# Patient Record
Sex: FEMALE | Race: WHITE | Employment: OTHER | URBAN - METROPOLITAN AREA
[De-identification: names, ages, dates, MRNs, and addresses within clinical notes are randomized per-mention and may not be internally consistent; named-entity substitution may affect disease eponyms.]

---

## 2017-07-12 ENCOUNTER — OFFICE VISIT (OUTPATIENT)
Dept: PODIATRY | Age: 82
End: 2017-07-12
Payer: MEDICARE

## 2017-07-12 VITALS
DIASTOLIC BLOOD PRESSURE: 71 MMHG | BODY MASS INDEX: 28.07 KG/M2 | WEIGHT: 143 LBS | HEIGHT: 60 IN | SYSTOLIC BLOOD PRESSURE: 156 MMHG | HEART RATE: 70 BPM

## 2017-07-12 DIAGNOSIS — B35.1 ONYCHOMYCOSIS: Primary | ICD-10-CM

## 2017-07-12 DIAGNOSIS — M79.675 PAIN IN TOES OF BOTH FEET: ICD-10-CM

## 2017-07-12 DIAGNOSIS — M79.674 PAIN IN TOES OF BOTH FEET: ICD-10-CM

## 2017-07-12 PROCEDURE — 1090F PRES/ABSN URINE INCON ASSESS: CPT | Performed by: PODIATRIST

## 2017-07-12 PROCEDURE — G8427 DOCREV CUR MEDS BY ELIG CLIN: HCPCS | Performed by: PODIATRIST

## 2017-07-12 PROCEDURE — 1036F TOBACCO NON-USER: CPT | Performed by: PODIATRIST

## 2017-07-12 PROCEDURE — G8400 PT W/DXA NO RESULTS DOC: HCPCS | Performed by: PODIATRIST

## 2017-07-12 PROCEDURE — 11721 DEBRIDE NAIL 6 OR MORE: CPT | Performed by: PODIATRIST

## 2017-07-12 PROCEDURE — G8419 CALC BMI OUT NRM PARAM NOF/U: HCPCS | Performed by: PODIATRIST

## 2017-07-12 PROCEDURE — 1123F ACP DISCUSS/DSCN MKR DOCD: CPT | Performed by: PODIATRIST

## 2017-07-12 PROCEDURE — 99203 OFFICE O/P NEW LOW 30 MIN: CPT | Performed by: PODIATRIST

## 2017-07-12 PROCEDURE — 4040F PNEUMOC VAC/ADMIN/RCVD: CPT | Performed by: PODIATRIST

## 2017-07-12 RX ORDER — BUTALBITAL, ACETAMINOPHEN AND CAFFEINE 50; 325; 40 MG/1; MG/1; MG/1
1 TABLET ORAL
COMMUNITY
End: 2018-04-24 | Stop reason: ALTCHOICE

## 2017-07-12 RX ORDER — OMEPRAZOLE 10 MG/1
10 CAPSULE, DELAYED RELEASE ORAL
COMMUNITY
End: 2018-07-24 | Stop reason: ALTCHOICE

## 2017-07-12 RX ORDER — CLOPIDOGREL BISULFATE 75 MG/1
75 TABLET ORAL
COMMUNITY

## 2017-07-12 RX ORDER — IBUPROFEN 800 MG/1
800 TABLET ORAL
COMMUNITY
End: 2018-04-24 | Stop reason: ALTCHOICE

## 2017-07-12 RX ORDER — DOCUSATE SODIUM 100 MG/1
100 CAPSULE, LIQUID FILLED ORAL
COMMUNITY
End: 2019-01-23

## 2017-07-12 RX ORDER — GEMFIBROZIL 600 MG/1
600 TABLET, FILM COATED ORAL 2 TIMES DAILY
COMMUNITY

## 2017-07-12 RX ORDER — NADOLOL 20 MG/1
5 TABLET ORAL
COMMUNITY
End: 2018-07-24

## 2017-07-12 RX ORDER — TRAMADOL HYDROCHLORIDE 50 MG/1
TABLET ORAL
COMMUNITY
Start: 2017-04-09 | End: 2018-07-24 | Stop reason: ALTCHOICE

## 2017-07-12 RX ORDER — NORTRIPTYLINE HYDROCHLORIDE 10 MG/1
50 CAPSULE ORAL
COMMUNITY
End: 2018-07-24

## 2017-07-12 RX ORDER — ASPIRIN 325 MG
81 TABLET ORAL
COMMUNITY
End: 2020-01-01

## 2017-07-12 RX ORDER — PHENOL 1.4 %
600 AEROSOL, SPRAY (ML) MUCOUS MEMBRANE
COMMUNITY
End: 2018-07-24

## 2017-07-12 RX ORDER — OXYBUTYNIN CHLORIDE 5 MG/1
5 TABLET ORAL
Status: ON HOLD | COMMUNITY
End: 2020-01-01

## 2017-07-12 RX ORDER — ATORVASTATIN CALCIUM 20 MG/1
20 TABLET, FILM COATED ORAL
COMMUNITY

## 2017-07-12 ASSESSMENT — ENCOUNTER SYMPTOMS
VOMITING: 0
COLOR CHANGE: 0
CONSTIPATION: 0
NAUSEA: 0
DIARRHEA: 0
BACK PAIN: 1

## 2017-10-11 ENCOUNTER — PROCEDURE VISIT (OUTPATIENT)
Dept: PODIATRY | Age: 82
End: 2017-10-11
Payer: MEDICARE

## 2017-10-11 VITALS
HEIGHT: 60 IN | HEART RATE: 52 BPM | DIASTOLIC BLOOD PRESSURE: 62 MMHG | SYSTOLIC BLOOD PRESSURE: 149 MMHG | WEIGHT: 158 LBS | BODY MASS INDEX: 31.02 KG/M2

## 2017-10-11 DIAGNOSIS — M79.675 PAIN IN TOES OF BOTH FEET: ICD-10-CM

## 2017-10-11 DIAGNOSIS — M79.674 PAIN IN TOES OF BOTH FEET: ICD-10-CM

## 2017-10-11 DIAGNOSIS — B35.1 ONYCHOMYCOSIS: Primary | ICD-10-CM

## 2017-10-11 PROCEDURE — 1036F TOBACCO NON-USER: CPT | Performed by: PODIATRIST

## 2017-10-11 PROCEDURE — 11721 DEBRIDE NAIL 6 OR MORE: CPT | Performed by: PODIATRIST

## 2017-10-11 RX ORDER — PANTOPRAZOLE SODIUM 40 MG/1
40 TABLET, DELAYED RELEASE ORAL DAILY
COMMUNITY
Start: 2017-09-11

## 2017-10-11 RX ORDER — GABAPENTIN 100 MG/1
CAPSULE ORAL 4 TIMES DAILY
COMMUNITY
Start: 2017-10-10 | End: 2019-01-01 | Stop reason: DRUGHIGH

## 2017-10-11 ASSESSMENT — ENCOUNTER SYMPTOMS
BACK PAIN: 1
VOMITING: 0
CONSTIPATION: 0
NAUSEA: 0
COLOR CHANGE: 0
DIARRHEA: 0

## 2017-10-11 NOTE — PROGRESS NOTES
Negative for leg swelling. Gastrointestinal: Negative for constipation, diarrhea, nausea and vomiting. Musculoskeletal: Positive for arthralgias, back pain, gait problem and joint swelling. Skin: Negative for color change, pallor, rash and wound. Neurological: Negative for weakness and numbness. Objective:  General: AAO x 3 in NAD. Derm  Toenail Description  Sites of Onychomycosis Involvement (Check affected area)  [x] [x] [x] [x] [x] [x] [x] [x] [x] [x]  5 4 3 2 1 1 2 3 4 5                          Right                                        Left    Thickness  [x] [x] [x] [x] [x] [x] [x] [x] [x] [x]  5 4 3 2 1 1 2 3 4 5                         Right                                        Left    Dystrophic Changes   [x] [x] [x] [x] [x] [x] [x] [x] [x] [x]  5 4 3 2 1 1 2 3 4 5                         Right                                        Left    Color  [x] [x] [x] [x] [x] [x] [x] [x] [x] [x]  5 4 3 2 1 1 2 3 4 5                          Right                                        Left    Incurvation/Ingrowin   [] [] [] [] [] [] [] [] [] []  5 4 3 2 1 1 2 3 4 5                         Right                                        Left    Inflammation/Pain   [x] [x] [x] [x] [x] [x] [x] [x] [x] [x]  5 4 3 2 1 1 2 3 4 5                         Right                                        Left       Nails are painful 1-10 with direct palpation. Vascular:  DP/PT pulses palpable 2/4, Bilateral.    CFT <3 seconds to digits 1-5, Bilateral .   Hair growth absent to level of digits, Bilateral.    Neurological:  Sensation present to light touch to level of digits, Bilateral.    Assessment:  80 y.o. female with:   1. Onychomycosis    2. Pain in toes of both feet       Orders Placed This Encounter   Procedures    OH DEBRIDEMENT OF NAILS, 6 OR MORE         Plan:   Pt was evaluated and examined. Patient was given personalized discharge instructions.   Nails 1-10 were debrided in length and thickness sharply with a nail nipper and  without incident. Pt will follow up in 3 months or sooner if any problems arise. Diagnosis was discussed with the pt and all of their questions were answered in detail. Proper foot hygiene and care was discussed with the pt. Patient to check feet daily and contact the office with any questions/problems/concerns. Other comorbidity noted and will be managed by PCP. Pain waiver discussed with patient and confirmed.    10/11/2017    Electronically signed by Ivelisse Khan DPM on 10/11/2017 at 4:54 PM

## 2018-01-03 ENCOUNTER — PROCEDURE VISIT (OUTPATIENT)
Dept: PODIATRY | Age: 83
End: 2018-01-03
Payer: MEDICARE

## 2018-01-03 VITALS
HEIGHT: 60 IN | WEIGHT: 143 LBS | HEART RATE: 55 BPM | DIASTOLIC BLOOD PRESSURE: 66 MMHG | BODY MASS INDEX: 28.07 KG/M2 | SYSTOLIC BLOOD PRESSURE: 157 MMHG

## 2018-01-03 DIAGNOSIS — M79.675 PAIN IN TOES OF BOTH FEET: ICD-10-CM

## 2018-01-03 DIAGNOSIS — M79.674 PAIN IN TOES OF BOTH FEET: ICD-10-CM

## 2018-01-03 DIAGNOSIS — B35.1 ONYCHOMYCOSIS: Primary | ICD-10-CM

## 2018-01-03 PROCEDURE — 11721 DEBRIDE NAIL 6 OR MORE: CPT | Performed by: PODIATRIST

## 2018-01-03 ASSESSMENT — ENCOUNTER SYMPTOMS
BACK PAIN: 1
VOMITING: 0
DIARRHEA: 0
COLOR CHANGE: 0
CONSTIPATION: 0
NAUSEA: 0

## 2018-04-24 ENCOUNTER — PROCEDURE VISIT (OUTPATIENT)
Dept: PODIATRY | Age: 83
End: 2018-04-24
Payer: MEDICARE

## 2018-04-24 VITALS
BODY MASS INDEX: 27.09 KG/M2 | WEIGHT: 138 LBS | HEIGHT: 60 IN | SYSTOLIC BLOOD PRESSURE: 144 MMHG | DIASTOLIC BLOOD PRESSURE: 76 MMHG | HEART RATE: 60 BPM

## 2018-04-24 DIAGNOSIS — B35.1 ONYCHOMYCOSIS: Primary | ICD-10-CM

## 2018-04-24 DIAGNOSIS — M79.675 PAIN IN TOES OF BOTH FEET: ICD-10-CM

## 2018-04-24 DIAGNOSIS — M79.674 PAIN IN TOES OF BOTH FEET: ICD-10-CM

## 2018-04-24 PROCEDURE — 11721 DEBRIDE NAIL 6 OR MORE: CPT | Performed by: PODIATRIST

## 2018-04-24 RX ORDER — FERROUS SULFATE 325(65) MG
325 TABLET ORAL
COMMUNITY
End: 2018-07-24

## 2018-04-24 RX ORDER — RANITIDINE 150 MG/1
150 TABLET ORAL
COMMUNITY

## 2018-04-24 RX ORDER — PREDNISONE 10 MG/1
15 TABLET ORAL
Status: ON HOLD | COMMUNITY
End: 2018-10-21

## 2018-04-24 RX ORDER — OXYCODONE HYDROCHLORIDE AND ACETAMINOPHEN 5; 325 MG/1; MG/1
TABLET ORAL
COMMUNITY
Start: 2018-04-04 | End: 2018-07-24 | Stop reason: ALTCHOICE

## 2018-04-24 ASSESSMENT — ENCOUNTER SYMPTOMS
VOMITING: 0
CONSTIPATION: 0
COLOR CHANGE: 0
DIARRHEA: 0
BACK PAIN: 1
NAUSEA: 0

## 2018-04-26 ENCOUNTER — ANESTHESIA (OUTPATIENT)
Dept: ENDOSCOPY | Age: 83
End: 2018-04-26
Payer: MEDICARE

## 2018-04-26 ENCOUNTER — HOSPITAL ENCOUNTER (OUTPATIENT)
Age: 83
Setting detail: OUTPATIENT SURGERY
Discharge: HOME | End: 2018-04-26
Attending: INTERNAL MEDICINE | Admitting: INTERNAL MEDICINE
Payer: MEDICARE

## 2018-04-26 ENCOUNTER — ANESTHESIA EVENT (OUTPATIENT)
Dept: ENDOSCOPY | Age: 83
End: 2018-04-26
Payer: MEDICARE

## 2018-04-26 VITALS
OXYGEN SATURATION: 95 % | HEIGHT: 60 IN | BODY MASS INDEX: 27.09 KG/M2 | DIASTOLIC BLOOD PRESSURE: 81 MMHG | TEMPERATURE: 98 F | HEART RATE: 62 BPM | RESPIRATION RATE: 16 BRPM | WEIGHT: 138 LBS | SYSTOLIC BLOOD PRESSURE: 137 MMHG

## 2018-04-26 VITALS
OXYGEN SATURATION: 99 % | RESPIRATION RATE: 16 BRPM | SYSTOLIC BLOOD PRESSURE: 123 MMHG | DIASTOLIC BLOOD PRESSURE: 61 MMHG

## 2018-04-26 PROCEDURE — 2580000003 HC RX 258: Performed by: INTERNAL MEDICINE

## 2018-04-26 PROCEDURE — 3609012700 HC EGD DILATION SAVORY: Performed by: INTERNAL MEDICINE

## 2018-04-26 PROCEDURE — 3609010300 HC COLONOSCOPY W/BIOPSY SINGLE/MULTIPLE: Performed by: INTERNAL MEDICINE

## 2018-04-26 PROCEDURE — 88305 TISSUE EXAM BY PATHOLOGIST: CPT

## 2018-04-26 PROCEDURE — 2500000003 HC RX 250 WO HCPCS: Performed by: NURSE ANESTHETIST, CERTIFIED REGISTERED

## 2018-04-26 PROCEDURE — 7100000010 HC PHASE II RECOVERY - FIRST 15 MIN: Performed by: INTERNAL MEDICINE

## 2018-04-26 PROCEDURE — 3700000001 HC ADD 15 MINUTES (ANESTHESIA): Performed by: INTERNAL MEDICINE

## 2018-04-26 PROCEDURE — 3700000000 HC ANESTHESIA ATTENDED CARE: Performed by: INTERNAL MEDICINE

## 2018-04-26 PROCEDURE — 7100000011 HC PHASE II RECOVERY - ADDTL 15 MIN: Performed by: INTERNAL MEDICINE

## 2018-04-26 PROCEDURE — 6360000002 HC RX W HCPCS: Performed by: NURSE ANESTHETIST, CERTIFIED REGISTERED

## 2018-04-26 RX ORDER — SODIUM CHLORIDE 9 MG/ML
INJECTION, SOLUTION INTRAVENOUS CONTINUOUS
Status: DISCONTINUED | OUTPATIENT
Start: 2018-04-26 | End: 2018-04-26 | Stop reason: HOSPADM

## 2018-04-26 RX ORDER — FENTANYL CITRATE 50 UG/ML
INJECTION, SOLUTION INTRAMUSCULAR; INTRAVENOUS PRN
Status: DISCONTINUED | OUTPATIENT
Start: 2018-04-26 | End: 2018-04-26 | Stop reason: SDUPTHER

## 2018-04-26 RX ORDER — LIDOCAINE HYDROCHLORIDE 10 MG/ML
INJECTION, SOLUTION EPIDURAL; INFILTRATION; INTRACAUDAL; PERINEURAL PRN
Status: DISCONTINUED | OUTPATIENT
Start: 2018-04-26 | End: 2018-04-26 | Stop reason: SDUPTHER

## 2018-04-26 RX ORDER — PROPOFOL 10 MG/ML
INJECTION, EMULSION INTRAVENOUS PRN
Status: DISCONTINUED | OUTPATIENT
Start: 2018-04-26 | End: 2018-04-26 | Stop reason: SDUPTHER

## 2018-04-26 RX ADMIN — FENTANYL CITRATE 25 MCG: 50 INJECTION INTRAMUSCULAR; INTRAVENOUS at 09:46

## 2018-04-26 RX ADMIN — PROPOFOL 200 MG: 10 INJECTION, EMULSION INTRAVENOUS at 09:50

## 2018-04-26 RX ADMIN — FENTANYL CITRATE 25 MCG: 50 INJECTION INTRAMUSCULAR; INTRAVENOUS at 09:49

## 2018-04-26 RX ADMIN — SODIUM CHLORIDE: 9 INJECTION, SOLUTION INTRAVENOUS at 09:42

## 2018-04-26 RX ADMIN — LIDOCAINE HYDROCHLORIDE 50 MG: 10 INJECTION, SOLUTION EPIDURAL; INFILTRATION; INTRACAUDAL; PERINEURAL at 09:49

## 2018-04-26 RX ADMIN — PROPOFOL 10 MG: 10 INJECTION, EMULSION INTRAVENOUS at 10:05

## 2018-04-26 ASSESSMENT — PAIN SCALES - GENERAL
PAINLEVEL_OUTOF10: 0

## 2018-04-27 LAB — SURGICAL PATHOLOGY REPORT: NORMAL

## 2018-07-24 ENCOUNTER — OFFICE VISIT (OUTPATIENT)
Dept: PODIATRY | Age: 83
End: 2018-07-24
Payer: MEDICARE

## 2018-07-24 VITALS
BODY MASS INDEX: 25.91 KG/M2 | SYSTOLIC BLOOD PRESSURE: 139 MMHG | HEART RATE: 56 BPM | HEIGHT: 60 IN | DIASTOLIC BLOOD PRESSURE: 67 MMHG | WEIGHT: 132 LBS

## 2018-07-24 DIAGNOSIS — M79.675 PAIN IN TOES OF BOTH FEET: ICD-10-CM

## 2018-07-24 DIAGNOSIS — M79.674 PAIN IN TOES OF BOTH FEET: ICD-10-CM

## 2018-07-24 DIAGNOSIS — B35.1 ONYCHOMYCOSIS: Primary | ICD-10-CM

## 2018-07-24 PROCEDURE — 99999 PR OFFICE/OUTPT VISIT,PROCEDURE ONLY: CPT | Performed by: PODIATRIST

## 2018-07-24 PROCEDURE — 11721 DEBRIDE NAIL 6 OR MORE: CPT | Performed by: PODIATRIST

## 2018-07-24 RX ORDER — MELOXICAM 7.5 MG/1
7.5 TABLET ORAL DAILY
COMMUNITY

## 2018-07-24 RX ORDER — HYDROCODONE BITARTRATE AND ACETAMINOPHEN 5; 325 MG/1; MG/1
1 TABLET ORAL EVERY 6 HOURS PRN
COMMUNITY

## 2018-07-24 RX ORDER — AMLODIPINE BESYLATE 5 MG/1
5 TABLET ORAL DAILY
COMMUNITY

## 2018-07-24 ASSESSMENT — ENCOUNTER SYMPTOMS
CONSTIPATION: 0
NAUSEA: 0
VOMITING: 0
DIARRHEA: 0
BACK PAIN: 1
COLOR CHANGE: 0

## 2018-07-24 NOTE — PROGRESS NOTES
[facility]  Return Patient    Chief Complaint   Patient presents with    Nail Problem     nail trim/stephanie Vieira 6/28/18       Subjective: This Urszula Alberts comes to clinic for foot and nail care. Pt currently has complaint of thickened, painful, elongated nails that he/she cannot manage by themselves. Pt. Relates pain to nails with shoe gear. Pt's primary care physician is Tutu Ramirez MD.  Past Medical History:   Diagnosis Date    Anemia     Arteriosclerosis     Arthritis     Atherosclerotic ulcer of aorta (HCC)     Bradycardia     Compression fracture of body of thoracic vertebra (HCC)     Constipation     Fibromyalgia     GERD (gastroesophageal reflux disease)     Hyperlipidemia     Osteoarthritis     Osteoporosis     Palpitations     Polymyalgia (HCC)        Allergies   Allergen Reactions    Adhesive Tape Other (See Comments)     Tears pt. Skin/blisters    Shellfish-Derived Products Anaphylaxis     Difficulty breathing    Codeine     Penicillins     Pregabalin     Sulfa Antibiotics      Current Outpatient Prescriptions on File Prior to Visit   Medication Sig Dispense Refill    ranitidine (ZANTAC) 150 MG tablet Take 150 mg by mouth      predniSONE (DELTASONE) 10 MG tablet Take 15 mg by mouth      pantoprazole (PROTONIX) 40 MG tablet       gabapentin (NEURONTIN) 100 MG capsule 4 times daily. Michael Allred aspirin 325 MG tablet Take 325 mg by mouth      atorvastatin (LIPITOR) 20 MG tablet Take 20 mg by mouth      clopidogrel (PLAVIX) 75 MG tablet Take 75 mg by mouth      docusate sodium (COLACE) 100 MG capsule Take 100 mg by mouth      gemfibrozil (LOPID) 600 MG tablet Take 600 mg by mouth      linaclotide (LINZESS) 145 MCG capsule Take 145 mcg by mouth      oxybutynin (DITROPAN) 5 MG tablet Take 5 mg by mouth       No current facility-administered medications on file prior to visit.       Review of Systems   Constitutional: Negative for chills, diaphoresis, fatigue, fever and unexpected weight change. Cardiovascular: Negative for leg swelling. Gastrointestinal: Negative for constipation, diarrhea, nausea and vomiting. Musculoskeletal: Positive for arthralgias, back pain, gait problem and joint swelling. Skin: Negative for color change, pallor, rash and wound. Neurological: Negative for weakness and numbness. Objective:  General: AAO x 3 in NAD. Derm  Toenail Description  Sites of Onychomycosis Involvement (Check affected area)  [x] [x] [x] [x] [x] [x] [x] [x] [x] [x]  5 4 3 2 1 1 2 3 4 5                          Right                                        Left    Thickness  [x] [x] [x] [x] [x] [x] [x] [x] [x] [x]  5 4 3 2 1 1 2 3 4 5                         Right                                        Left    Dystrophic Changes   [x] [x] [x] [x] [x] [x] [x] [x] [x] [x]  5 4 3 2 1 1 2 3 4 5                         Right                                        Left    Color  [x] [x] [x] [x] [x] [x] [x] [x] [x] [x]  5 4 3 2 1 1 2 3 4 5                          Right                                        Left    Incurvation/Ingrowin   [] [] [] [] [] [] [] [] [] []  5 4 3 2 1 1 2 3 4 5                         Right                                        Left    Inflammation/Pain   [x] [x] [x] [x] [x] [x] [x] [x] [x] [x]  5 4 3 2 1 1 2 3 4 5                         Right                                        Left       Nails are painful 1-10 with direct palpation. Vascular:  DP/PT pulses palpable 2/4, Bilateral.    CFT <3 seconds to digits 1-5, Bilateral .   Hair growth absent to level of digits, Bilateral.    Neurological:  Sensation present to light touch to level of digits, Bilateral.    Assessment:  [de-identified] y.o. female with:   1. Onychomycosis    2. Pain in toes of both feet       Orders Placed This Encounter   Procedures    LA DEBRIDEMENT OF NAILS, 6 OR MORE         Plan:   Pt was evaluated and examined.  Patient was given personalized discharge instructions. Nails 1-10 were debrided in length and thickness sharply with a nail nipper and  without incident. Pt will follow up in 3 months or sooner if any problems arise. Diagnosis was discussed with the pt and all of their questions were answered in detail. Proper foot hygiene and care was discussed with the pt. Patient to check feet daily and contact the office with any questions/problems/concerns. Other comorbidity noted and will be managed by PCP. Pain waiver discussed with patient and confirmed.    7/24/2018    Electronically signed by Girma Carter DPM on 7/24/2018 at 11:56 AM

## 2018-08-24 ENCOUNTER — HOSPITAL ENCOUNTER (INPATIENT)
Age: 83
LOS: 4 days | Discharge: HOME | DRG: 517 | End: 2018-08-28
Attending: INTERNAL MEDICINE | Admitting: INTERNAL MEDICINE
Payer: MEDICARE

## 2018-08-24 DIAGNOSIS — G89.18 POST-OP PAIN: Primary | ICD-10-CM

## 2018-08-24 PROBLEM — S22.080A COMPRESSION FRACTURE OF T12 VERTEBRA (HCC): Status: ACTIVE | Noted: 2018-08-24

## 2018-08-24 PROCEDURE — 0QU03JZ SUPPLEMENT LUMBAR VERTEBRA WITH SYNTHETIC SUBSTITUTE, PERCUTANEOUS APPROACH: ICD-10-PCS | Performed by: ORTHOPAEDIC SURGERY

## 2018-08-24 PROCEDURE — 0QS03ZZ REPOSITION LUMBAR VERTEBRA, PERCUTANEOUS APPROACH: ICD-10-PCS | Performed by: ORTHOPAEDIC SURGERY

## 2018-08-24 PROCEDURE — 1200000000 HC SEMI PRIVATE

## 2018-08-24 PROCEDURE — 0PS43ZZ REPOSITION THORACIC VERTEBRA, PERCUTANEOUS APPROACH: ICD-10-PCS | Performed by: ORTHOPAEDIC SURGERY

## 2018-08-24 PROCEDURE — 0PU43JZ SUPPLEMENT THORACIC VERTEBRA WITH SYNTHETIC SUBSTITUTE, PERCUTANEOUS APPROACH: ICD-10-PCS | Performed by: ORTHOPAEDIC SURGERY

## 2018-08-24 RX ORDER — ONDANSETRON 2 MG/ML
4 INJECTION INTRAMUSCULAR; INTRAVENOUS EVERY 6 HOURS PRN
Status: DISCONTINUED | OUTPATIENT
Start: 2018-08-24 | End: 2018-08-28 | Stop reason: HOSPADM

## 2018-08-24 RX ORDER — ACETAMINOPHEN 325 MG/1
650 TABLET ORAL EVERY 4 HOURS PRN
Status: DISCONTINUED | OUTPATIENT
Start: 2018-08-24 | End: 2018-08-28 | Stop reason: HOSPADM

## 2018-08-24 RX ORDER — POTASSIUM CHLORIDE 7.45 MG/ML
10 INJECTION INTRAVENOUS PRN
Status: DISCONTINUED | OUTPATIENT
Start: 2018-08-24 | End: 2018-08-28 | Stop reason: HOSPADM

## 2018-08-24 RX ORDER — NICOTINE 21 MG/24HR
1 PATCH, TRANSDERMAL 24 HOURS TRANSDERMAL DAILY PRN
Status: DISCONTINUED | OUTPATIENT
Start: 2018-08-24 | End: 2018-08-28 | Stop reason: HOSPADM

## 2018-08-24 RX ORDER — POTASSIUM CHLORIDE 20 MEQ/1
40 TABLET, EXTENDED RELEASE ORAL PRN
Status: DISCONTINUED | OUTPATIENT
Start: 2018-08-24 | End: 2018-08-28 | Stop reason: HOSPADM

## 2018-08-24 RX ORDER — HYDROCODONE BITARTRATE AND ACETAMINOPHEN 5; 325 MG/1; MG/1
2 TABLET ORAL EVERY 4 HOURS PRN
Status: DISCONTINUED | OUTPATIENT
Start: 2018-08-24 | End: 2018-08-28 | Stop reason: HOSPADM

## 2018-08-24 RX ORDER — MAGNESIUM SULFATE 1 G/100ML
1 INJECTION INTRAVENOUS PRN
Status: DISCONTINUED | OUTPATIENT
Start: 2018-08-24 | End: 2018-08-28 | Stop reason: HOSPADM

## 2018-08-24 RX ORDER — BISACODYL 10 MG
10 SUPPOSITORY, RECTAL RECTAL DAILY PRN
Status: DISCONTINUED | OUTPATIENT
Start: 2018-08-24 | End: 2018-08-28 | Stop reason: HOSPADM

## 2018-08-24 RX ORDER — SODIUM CHLORIDE 0.9 % (FLUSH) 0.9 %
10 SYRINGE (ML) INJECTION PRN
Status: DISCONTINUED | OUTPATIENT
Start: 2018-08-24 | End: 2018-08-28 | Stop reason: HOSPADM

## 2018-08-24 RX ORDER — HYDROCODONE BITARTRATE AND ACETAMINOPHEN 5; 325 MG/1; MG/1
1 TABLET ORAL EVERY 4 HOURS PRN
Status: DISCONTINUED | OUTPATIENT
Start: 2018-08-24 | End: 2018-08-28 | Stop reason: HOSPADM

## 2018-08-24 RX ORDER — SODIUM CHLORIDE 0.9 % (FLUSH) 0.9 %
10 SYRINGE (ML) INJECTION EVERY 12 HOURS SCHEDULED
Status: DISCONTINUED | OUTPATIENT
Start: 2018-08-25 | End: 2018-08-28 | Stop reason: HOSPADM

## 2018-08-24 RX ORDER — POTASSIUM CHLORIDE 20MEQ/15ML
40 LIQUID (ML) ORAL PRN
Status: DISCONTINUED | OUTPATIENT
Start: 2018-08-24 | End: 2018-08-28 | Stop reason: HOSPADM

## 2018-08-24 ASSESSMENT — PAIN DESCRIPTION - LOCATION: LOCATION: BACK

## 2018-08-24 ASSESSMENT — PAIN SCALES - GENERAL
PAINLEVEL_OUTOF10: 8
PAINLEVEL_OUTOF10: 8

## 2018-08-24 ASSESSMENT — PAIN DESCRIPTION - PAIN TYPE: TYPE: ACUTE PAIN

## 2018-08-25 PROBLEM — I70.0 ATHEROSCLEROTIC ULCER OF AORTA (HCC): Status: ACTIVE | Noted: 2018-08-25

## 2018-08-25 PROBLEM — M19.90 OSTEOARTHRITIS: Status: ACTIVE | Noted: 2018-08-25

## 2018-08-25 PROBLEM — M48.061 SPINAL STENOSIS OF LUMBAR REGION: Status: ACTIVE | Noted: 2018-08-25

## 2018-08-25 PROBLEM — I71.9 ATHEROSCLEROTIC ULCER OF AORTA (HCC): Status: ACTIVE | Noted: 2018-08-25

## 2018-08-25 PROBLEM — S32.000A COMPRESSION FRACTURE OF LUMBAR VERTEBRA, CLOSED, INITIAL ENCOUNTER (HCC): Status: ACTIVE | Noted: 2018-08-25

## 2018-08-25 PROBLEM — K21.9 GERD (GASTROESOPHAGEAL REFLUX DISEASE): Status: ACTIVE | Noted: 2018-08-25

## 2018-08-25 LAB
ANION GAP SERPL CALCULATED.3IONS-SCNC: 12 MMOL/L (ref 9–17)
BUN BLDV-MCNC: 28 MG/DL (ref 8–23)
BUN/CREAT BLD: ABNORMAL (ref 9–20)
CALCIUM SERPL-MCNC: 8.6 MG/DL (ref 8.6–10.4)
CHLORIDE BLD-SCNC: 114 MMOL/L (ref 98–107)
CO2: 17 MMOL/L (ref 20–31)
CREAT SERPL-MCNC: 0.93 MG/DL (ref 0.5–0.9)
GFR AFRICAN AMERICAN: >60 ML/MIN
GFR NON-AFRICAN AMERICAN: 58 ML/MIN
GFR SERPL CREATININE-BSD FRML MDRD: ABNORMAL ML/MIN/{1.73_M2}
GFR SERPL CREATININE-BSD FRML MDRD: ABNORMAL ML/MIN/{1.73_M2}
GLUCOSE BLD-MCNC: 97 MG/DL (ref 70–99)
HCT VFR BLD CALC: 30.8 % (ref 36.3–47.1)
HEMOGLOBIN: 9.8 G/DL (ref 11.9–15.1)
INR BLD: 0.9
MCH RBC QN AUTO: 33.4 PG (ref 25.2–33.5)
MCHC RBC AUTO-ENTMCNC: 31.8 G/DL (ref 28.4–34.8)
MCV RBC AUTO: 105.1 FL (ref 82.6–102.9)
NRBC AUTOMATED: 0 PER 100 WBC
PDW BLD-RTO: 14.6 % (ref 11.8–14.4)
PLATELET # BLD: 288 K/UL (ref 138–453)
PMV BLD AUTO: 10 FL (ref 8.1–13.5)
POTASSIUM SERPL-SCNC: 4 MMOL/L (ref 3.7–5.3)
PROTHROMBIN TIME: 9.9 SEC (ref 9–12)
RBC # BLD: 2.93 M/UL (ref 3.95–5.11)
SODIUM BLD-SCNC: 143 MMOL/L (ref 135–144)
WBC # BLD: 9 K/UL (ref 3.5–11.3)

## 2018-08-25 PROCEDURE — 2580000003 HC RX 258: Performed by: NURSE PRACTITIONER

## 2018-08-25 PROCEDURE — 1200000000 HC SEMI PRIVATE

## 2018-08-25 PROCEDURE — 6360000002 HC RX W HCPCS: Performed by: NURSE PRACTITIONER

## 2018-08-25 PROCEDURE — 6370000000 HC RX 637 (ALT 250 FOR IP): Performed by: NURSE PRACTITIONER

## 2018-08-25 PROCEDURE — 99222 1ST HOSP IP/OBS MODERATE 55: CPT | Performed by: FAMILY MEDICINE

## 2018-08-25 PROCEDURE — 36415 COLL VENOUS BLD VENIPUNCTURE: CPT

## 2018-08-25 PROCEDURE — 85610 PROTHROMBIN TIME: CPT

## 2018-08-25 PROCEDURE — 80048 BASIC METABOLIC PNL TOTAL CA: CPT

## 2018-08-25 PROCEDURE — 85027 COMPLETE CBC AUTOMATED: CPT

## 2018-08-25 PROCEDURE — 94762 N-INVAS EAR/PLS OXIMTRY CONT: CPT

## 2018-08-25 RX ORDER — AMLODIPINE BESYLATE 5 MG/1
5 TABLET ORAL DAILY
Status: DISCONTINUED | OUTPATIENT
Start: 2018-08-25 | End: 2018-08-28 | Stop reason: HOSPADM

## 2018-08-25 RX ORDER — ATORVASTATIN CALCIUM 20 MG/1
20 TABLET, FILM COATED ORAL NIGHTLY
Status: DISCONTINUED | OUTPATIENT
Start: 2018-08-25 | End: 2018-08-28 | Stop reason: HOSPADM

## 2018-08-25 RX ORDER — DOCUSATE SODIUM 100 MG/1
100 CAPSULE, LIQUID FILLED ORAL 2 TIMES DAILY
Status: DISCONTINUED | OUTPATIENT
Start: 2018-08-25 | End: 2018-08-28 | Stop reason: HOSPADM

## 2018-08-25 RX ORDER — AMMONIUM LACTATE 12 G/100G
CREAM TOPICAL
Status: DISCONTINUED | OUTPATIENT
Start: 2018-08-25 | End: 2018-08-28 | Stop reason: HOSPADM

## 2018-08-25 RX ORDER — FAMOTIDINE 20 MG/1
20 TABLET, FILM COATED ORAL 2 TIMES DAILY
Status: DISCONTINUED | OUTPATIENT
Start: 2018-08-25 | End: 2018-08-27

## 2018-08-25 RX ORDER — ASPIRIN 81 MG/1
81 TABLET ORAL DAILY
Status: DISCONTINUED | OUTPATIENT
Start: 2018-08-25 | End: 2018-08-28 | Stop reason: HOSPADM

## 2018-08-25 RX ORDER — CLINDAMYCIN PHOSPHATE 900 MG/50ML
900 INJECTION INTRAVENOUS
Status: DISCONTINUED | OUTPATIENT
Start: 2018-08-27 | End: 2018-08-27

## 2018-08-25 RX ORDER — OXYBUTYNIN CHLORIDE 5 MG/1
5 TABLET ORAL 2 TIMES DAILY
Status: DISCONTINUED | OUTPATIENT
Start: 2018-08-25 | End: 2018-08-28 | Stop reason: HOSPADM

## 2018-08-25 RX ORDER — CLOPIDOGREL BISULFATE 75 MG/1
75 TABLET ORAL DAILY
Status: DISCONTINUED | OUTPATIENT
Start: 2018-08-25 | End: 2018-08-28 | Stop reason: HOSPADM

## 2018-08-25 RX ORDER — DIPHENHYDRAMINE HYDROCHLORIDE 50 MG/ML
25 INJECTION INTRAMUSCULAR; INTRAVENOUS EVERY 6 HOURS PRN
Status: DISCONTINUED | OUTPATIENT
Start: 2018-08-25 | End: 2018-08-26

## 2018-08-25 RX ORDER — GABAPENTIN 100 MG/1
200 CAPSULE ORAL 3 TIMES DAILY
Status: DISCONTINUED | OUTPATIENT
Start: 2018-08-25 | End: 2018-08-28 | Stop reason: HOSPADM

## 2018-08-25 RX ORDER — PANTOPRAZOLE SODIUM 40 MG/1
40 TABLET, DELAYED RELEASE ORAL
Status: DISCONTINUED | OUTPATIENT
Start: 2018-08-25 | End: 2018-08-28 | Stop reason: HOSPADM

## 2018-08-25 RX ORDER — GEMFIBROZIL 600 MG/1
600 TABLET, FILM COATED ORAL 2 TIMES DAILY WITH MEALS
Status: DISCONTINUED | OUTPATIENT
Start: 2018-08-25 | End: 2018-08-28 | Stop reason: HOSPADM

## 2018-08-25 RX ADMIN — GABAPENTIN 200 MG: 100 CAPSULE ORAL at 22:14

## 2018-08-25 RX ADMIN — FAMOTIDINE 20 MG: 20 TABLET, FILM COATED ORAL at 01:45

## 2018-08-25 RX ADMIN — Medication 10 ML: at 22:15

## 2018-08-25 RX ADMIN — OXYBUTYNIN CHLORIDE 5 MG: 5 TABLET ORAL at 09:11

## 2018-08-25 RX ADMIN — Medication: at 23:29

## 2018-08-25 RX ADMIN — OXYBUTYNIN CHLORIDE 5 MG: 5 TABLET ORAL at 01:46

## 2018-08-25 RX ADMIN — AMLODIPINE BESYLATE 5 MG: 5 TABLET ORAL at 09:11

## 2018-08-25 RX ADMIN — OXYBUTYNIN CHLORIDE 5 MG: 5 TABLET ORAL at 22:14

## 2018-08-25 RX ADMIN — HYDROCODONE BITARTRATE AND ACETAMINOPHEN 2 TABLET: 5; 325 TABLET ORAL at 09:05

## 2018-08-25 RX ADMIN — HYDROCODONE BITARTRATE AND ACETAMINOPHEN 2 TABLET: 5; 325 TABLET ORAL at 14:06

## 2018-08-25 RX ADMIN — ATORVASTATIN CALCIUM 20 MG: 20 TABLET, FILM COATED ORAL at 22:15

## 2018-08-25 RX ADMIN — HYDROCODONE BITARTRATE AND ACETAMINOPHEN 2 TABLET: 5; 325 TABLET ORAL at 18:09

## 2018-08-25 RX ADMIN — ASPIRIN 81 MG: 81 TABLET, DELAYED RELEASE ORAL at 01:47

## 2018-08-25 RX ADMIN — CLOPIDOGREL 75 MG: 75 TABLET, FILM COATED ORAL at 09:10

## 2018-08-25 RX ADMIN — FAMOTIDINE 20 MG: 20 TABLET, FILM COATED ORAL at 22:15

## 2018-08-25 RX ADMIN — HYDROCODONE BITARTRATE AND ACETAMINOPHEN 2 TABLET: 5; 325 TABLET ORAL at 22:15

## 2018-08-25 RX ADMIN — FAMOTIDINE 20 MG: 20 TABLET, FILM COATED ORAL at 09:09

## 2018-08-25 RX ADMIN — ENOXAPARIN SODIUM 40 MG: 40 INJECTION SUBCUTANEOUS at 09:13

## 2018-08-25 RX ADMIN — HYDROMORPHONE HYDROCHLORIDE 0.5 MG: 1 INJECTION, SOLUTION INTRAMUSCULAR; INTRAVENOUS; SUBCUTANEOUS at 00:20

## 2018-08-25 RX ADMIN — PANTOPRAZOLE SODIUM 40 MG: 40 TABLET, DELAYED RELEASE ORAL at 09:08

## 2018-08-25 RX ADMIN — ATORVASTATIN CALCIUM 20 MG: 20 TABLET, FILM COATED ORAL at 01:46

## 2018-08-25 RX ADMIN — Medication 10 ML: at 09:13

## 2018-08-25 RX ADMIN — HYDROMORPHONE HYDROCHLORIDE 0.5 MG: 1 INJECTION, SOLUTION INTRAMUSCULAR; INTRAVENOUS; SUBCUTANEOUS at 03:48

## 2018-08-25 RX ADMIN — GABAPENTIN 200 MG: 100 CAPSULE ORAL at 09:08

## 2018-08-25 RX ADMIN — HYDROMORPHONE HYDROCHLORIDE 0.5 MG: 1 INJECTION, SOLUTION INTRAMUSCULAR; INTRAVENOUS; SUBCUTANEOUS at 15:52

## 2018-08-25 RX ADMIN — GEMFIBROZIL 600 MG: 600 TABLET ORAL at 09:12

## 2018-08-25 RX ADMIN — GEMFIBROZIL 600 MG: 600 TABLET ORAL at 16:58

## 2018-08-25 RX ADMIN — GABAPENTIN 200 MG: 100 CAPSULE ORAL at 15:56

## 2018-08-25 ASSESSMENT — PAIN DESCRIPTION - FREQUENCY
FREQUENCY: INTERMITTENT

## 2018-08-25 ASSESSMENT — PAIN DESCRIPTION - PROGRESSION
CLINICAL_PROGRESSION: GRADUALLY WORSENING
CLINICAL_PROGRESSION: GRADUALLY IMPROVING

## 2018-08-25 ASSESSMENT — PAIN DESCRIPTION - LOCATION
LOCATION: BACK;RIB CAGE
LOCATION: BACK

## 2018-08-25 ASSESSMENT — PAIN SCALES - GENERAL
PAINLEVEL_OUTOF10: 6
PAINLEVEL_OUTOF10: 3
PAINLEVEL_OUTOF10: 7
PAINLEVEL_OUTOF10: 10
PAINLEVEL_OUTOF10: 0
PAINLEVEL_OUTOF10: 7
PAINLEVEL_OUTOF10: 3
PAINLEVEL_OUTOF10: 8
PAINLEVEL_OUTOF10: 9
PAINLEVEL_OUTOF10: 8
PAINLEVEL_OUTOF10: 8
PAINLEVEL_OUTOF10: 3

## 2018-08-25 ASSESSMENT — PAIN DESCRIPTION - PAIN TYPE
TYPE: ACUTE PAIN

## 2018-08-25 ASSESSMENT — PAIN DESCRIPTION - DESCRIPTORS
DESCRIPTORS: DISCOMFORT;PATIENT UNABLE TO DESCRIBE
DESCRIPTORS: DISCOMFORT
DESCRIPTORS: DISCOMFORT;PATIENT UNABLE TO DESCRIBE
DESCRIPTORS: DISCOMFORT;PATIENT UNABLE TO DESCRIBE

## 2018-08-25 ASSESSMENT — PAIN DESCRIPTION - ONSET
ONSET: SUDDEN
ONSET: ON-GOING
ONSET: SUDDEN

## 2018-08-25 ASSESSMENT — PAIN DESCRIPTION - ORIENTATION
ORIENTATION: RIGHT;ANTERIOR
ORIENTATION: RIGHT;LEFT
ORIENTATION: RIGHT;LEFT

## 2018-08-25 ASSESSMENT — ACTIVITIES OF DAILY LIVING (ADL): EFFECT OF PAIN ON DAILY ACTIVITIES: YES

## 2018-08-25 NOTE — H&P
hour   Intake              540 ml   Output              500 ml   Net               40 ml       General Appearance:  alert, well appearing, and in no acute distress  Mental status: oriented to person, place, and time with normal affect  Head:  normocephalic, atraumatic. Eye: no icterus, redness, pupils equal and reactive, extraocular eye movements intact, conjunctiva clear  Ear: normal external ear, no discharge, hearing intact  Nose:  no drainage noted  Mouth: mucous membranes moist  Neck: supple, no carotid bruits, thyroid not palpable  Lungs: Bilateral equal air entry, clear to ausculation, no wheezing, rales or rhonchi, normal effort  Cardiovascular: normal rate, regular rhythm, no murmur, gallop, rub.   Abdomen: Soft, nontender, nondistended, normal bowel sounds, no hepatomegaly or splenomegaly  Neurologic: There are no new focal motor or sensory deficits, normal muscle tone and bulk, no abnormal sensation, normal speech, cranial nerves II through XII grossly intact  TTP across thoracic, lumbar and sacral area  Skin: No gross lesions, rashes, bruising or bleeding on exposed skin area  Extremities:  peripheral pulses palpable, no pedal edema or calf pain with palpation  Psych: normal affect    Investigations:      Laboratory Testing:  Recent Results (from the past 24 hour(s))   Basic Metabolic Panel w/ Reflex to MG    Collection Time: 08/25/18  5:37 AM   Result Value Ref Range    Glucose 97 70 - 99 mg/dL    BUN 28 (H) 8 - 23 mg/dL    CREATININE 0.93 (H) 0.50 - 0.90 mg/dL    Bun/Cre Ratio NOT REPORTED 9 - 20    Calcium 8.6 8.6 - 10.4 mg/dL    Sodium 143 135 - 144 mmol/L    Potassium 4.0 3.7 - 5.3 mmol/L    Chloride 114 (H) 98 - 107 mmol/L    CO2 17 (L) 20 - 31 mmol/L    Anion Gap 12 9 - 17 mmol/L    GFR Non-African American 58 (L) >60 mL/min    GFR African American >60 >60 mL/min    GFR Comment          GFR Staging NOT REPORTED    CBC    Collection Time: 08/25/18  5:37 AM   Result Value Ref Range    WBC 9.0 3.5 -

## 2018-08-26 ENCOUNTER — ANESTHESIA EVENT (OUTPATIENT)
Dept: OPERATING ROOM | Age: 83
DRG: 517 | End: 2018-08-26
Payer: MEDICARE

## 2018-08-26 LAB
EKG ATRIAL RATE: 87 BPM
EKG P AXIS: 23 DEGREES
EKG P-R INTERVAL: 148 MS
EKG Q-T INTERVAL: 358 MS
EKG QRS DURATION: 92 MS
EKG QTC CALCULATION (BAZETT): 430 MS
EKG R AXIS: -45 DEGREES
EKG T AXIS: 8 DEGREES
EKG VENTRICULAR RATE: 87 BPM

## 2018-08-26 PROCEDURE — 2580000003 HC RX 258: Performed by: NURSE PRACTITIONER

## 2018-08-26 PROCEDURE — 6370000000 HC RX 637 (ALT 250 FOR IP): Performed by: NURSE PRACTITIONER

## 2018-08-26 PROCEDURE — 93005 ELECTROCARDIOGRAM TRACING: CPT

## 2018-08-26 PROCEDURE — 99232 SBSQ HOSP IP/OBS MODERATE 35: CPT | Performed by: FAMILY MEDICINE

## 2018-08-26 PROCEDURE — 1200000000 HC SEMI PRIVATE

## 2018-08-26 PROCEDURE — 6360000002 HC RX W HCPCS: Performed by: NURSE PRACTITIONER

## 2018-08-26 PROCEDURE — 94762 N-INVAS EAR/PLS OXIMTRY CONT: CPT

## 2018-08-26 RX ORDER — HALOPERIDOL 5 MG/ML
2 INJECTION INTRAMUSCULAR EVERY 6 HOURS PRN
Status: DISCONTINUED | OUTPATIENT
Start: 2018-08-26 | End: 2018-08-28 | Stop reason: HOSPADM

## 2018-08-26 RX ADMIN — GEMFIBROZIL 600 MG: 600 TABLET ORAL at 08:30

## 2018-08-26 RX ADMIN — GABAPENTIN 200 MG: 100 CAPSULE ORAL at 10:17

## 2018-08-26 RX ADMIN — HYDROMORPHONE HYDROCHLORIDE 0.5 MG: 1 INJECTION, SOLUTION INTRAMUSCULAR; INTRAVENOUS; SUBCUTANEOUS at 00:23

## 2018-08-26 RX ADMIN — HYDROCODONE BITARTRATE AND ACETAMINOPHEN 1 TABLET: 5; 325 TABLET ORAL at 16:48

## 2018-08-26 RX ADMIN — FAMOTIDINE 20 MG: 20 TABLET, FILM COATED ORAL at 10:16

## 2018-08-26 RX ADMIN — ENOXAPARIN SODIUM 40 MG: 40 INJECTION SUBCUTANEOUS at 10:07

## 2018-08-26 RX ADMIN — FAMOTIDINE 20 MG: 20 TABLET, FILM COATED ORAL at 21:52

## 2018-08-26 RX ADMIN — HYDROMORPHONE HYDROCHLORIDE 0.5 MG: 1 INJECTION, SOLUTION INTRAMUSCULAR; INTRAVENOUS; SUBCUTANEOUS at 08:06

## 2018-08-26 RX ADMIN — Medication: at 07:23

## 2018-08-26 RX ADMIN — DIPHENHYDRAMINE HYDROCHLORIDE: 50 INJECTION INTRAMUSCULAR; INTRAVENOUS at 10:14

## 2018-08-26 RX ADMIN — HYDROCODONE BITARTRATE AND ACETAMINOPHEN 2 TABLET: 5; 325 TABLET ORAL at 05:11

## 2018-08-26 RX ADMIN — ATORVASTATIN CALCIUM 20 MG: 20 TABLET, FILM COATED ORAL at 21:52

## 2018-08-26 RX ADMIN — HYDROCODONE BITARTRATE AND ACETAMINOPHEN 2 TABLET: 5; 325 TABLET ORAL at 21:52

## 2018-08-26 RX ADMIN — Medication 10 ML: at 10:15

## 2018-08-26 RX ADMIN — GEMFIBROZIL 600 MG: 600 TABLET ORAL at 21:52

## 2018-08-26 RX ADMIN — OXYBUTYNIN CHLORIDE 5 MG: 5 TABLET ORAL at 10:16

## 2018-08-26 RX ADMIN — GABAPENTIN 200 MG: 100 CAPSULE ORAL at 21:52

## 2018-08-26 RX ADMIN — AMLODIPINE BESYLATE 5 MG: 5 TABLET ORAL at 10:16

## 2018-08-26 RX ADMIN — Medication 10 ML: at 22:25

## 2018-08-26 RX ADMIN — PANTOPRAZOLE SODIUM 40 MG: 40 TABLET, DELAYED RELEASE ORAL at 08:30

## 2018-08-26 RX ADMIN — ASPIRIN 81 MG: 81 TABLET, DELAYED RELEASE ORAL at 10:16

## 2018-08-26 RX ADMIN — CLOPIDOGREL 75 MG: 75 TABLET, FILM COATED ORAL at 10:16

## 2018-08-26 RX ADMIN — OXYBUTYNIN CHLORIDE 5 MG: 5 TABLET ORAL at 21:52

## 2018-08-26 ASSESSMENT — PAIN DESCRIPTION - ONSET
ONSET: ON-GOING

## 2018-08-26 ASSESSMENT — PAIN DESCRIPTION - PROGRESSION
CLINICAL_PROGRESSION: NOT CHANGED
CLINICAL_PROGRESSION: OTHER (COMMENT)
CLINICAL_PROGRESSION: NOT CHANGED

## 2018-08-26 ASSESSMENT — PAIN DESCRIPTION - ORIENTATION
ORIENTATION: RIGHT;LOWER
ORIENTATION: RIGHT;LOWER

## 2018-08-26 ASSESSMENT — PAIN DESCRIPTION - PAIN TYPE
TYPE: ACUTE PAIN

## 2018-08-26 ASSESSMENT — PAIN DESCRIPTION - LOCATION
LOCATION: BACK
LOCATION: BACK;HEAD
LOCATION: BACK
LOCATION: BACK

## 2018-08-26 ASSESSMENT — PAIN DESCRIPTION - FREQUENCY
FREQUENCY: INTERMITTENT

## 2018-08-26 ASSESSMENT — PAIN DESCRIPTION - DESCRIPTORS
DESCRIPTORS: DISCOMFORT
DESCRIPTORS: ACHING;DISCOMFORT
DESCRIPTORS: CONSTANT;DISCOMFORT
DESCRIPTORS: ACHING;DISCOMFORT

## 2018-08-26 ASSESSMENT — PAIN SCALES - GENERAL
PAINLEVEL_OUTOF10: 6
PAINLEVEL_OUTOF10: 0
PAINLEVEL_OUTOF10: 10
PAINLEVEL_OUTOF10: 7
PAINLEVEL_OUTOF10: 6
PAINLEVEL_OUTOF10: 7
PAINLEVEL_OUTOF10: 8
PAINLEVEL_OUTOF10: 7

## 2018-08-26 NOTE — PROGRESS NOTES
constipation, diarrhea, nausea, vomiting  Neurological:  negative for dizziness, headache    Medications: Allergies: Allergies   Allergen Reactions    Adhesive Tape Other (See Comments)     Tears pt. Skin/blisters    Shellfish-Derived Products Anaphylaxis     Difficulty breathing    Codeine     Penicillins     Pregabalin     Sulfa Antibiotics        Current Meds:   Scheduled Meds:    amLODIPine  5 mg Oral Daily    atorvastatin  20 mg Oral Nightly    clopidogrel  75 mg Oral Daily    docusate sodium  100 mg Oral BID    gabapentin  200 mg Oral TID    gemfibrozil  600 mg Oral BID WC    linaclotide  145 mcg Oral QAM AC    oxybutynin  5 mg Oral BID    pantoprazole  40 mg Oral QAM AC    famotidine  20 mg Oral BID    aspirin  81 mg Oral Daily    [START ON 8/27/2018] clindamycin (CLEOCIN) IV  900 mg Intravenous On Call to OR    enoxaparin  40 mg Subcutaneous Daily    sodium chloride flush  10 mL Intravenous 2 times per day     Continuous Infusions:   PRN Meds: diphenhydrAMINE, ammonium lactate, acetaminophen, bisacodyl, magnesium hydroxide, magnesium sulfate, nicotine, ondansetron, potassium chloride **OR** potassium chloride **OR** potassium chloride, sodium chloride flush, HYDROcodone 5 mg - acetaminophen **OR** HYDROcodone 5 mg - acetaminophen, HYDROmorphone **OR** HYDROmorphone    Data:     Past Medical History:   has a past medical history of Anemia; Arteriosclerosis; Arthritis; Atherosclerotic ulcer of aorta ([address] Bradycardia; Compression fracture of body of thoracic vertebra ([address] Constipation; Fibromyalgia; GERD (gastroesophageal reflux disease); Hyperlipidemia; Osteoarthritis; Osteoporosis; Palpitations; and Polymyalgia ([address] Social History:   reports that she has never smoked.  She has never used smokeless tobacco.     Family History:   Family History   Problem Relation Age of Onset    Asthma Daughter     Diabetes Son     Heart Attack Mother         PAD/PVD    Prostate Cancer               MR lumbar spine with and without contrast (08/24/2018 8:54 PM)   Specimen Performing Laboratory     [facility]       MR lumbar spine with and without contrast (08/24/2018 8:54 PM)   Narrative      IMPRESSION:        1.  Nonhealed/acute superior endplate fracture of J9    2.  Nonhealed/acute inferior endplate fracture F6.    3.  Nonhealed/acute inferior endplate fracture at Q0.    4.  Interval treatment of L2 with kyphoplasty.    5.  Nonhealed/acute inferior endplate fracture of L2.    6.  Suspect superior endplate fracture D17, partially included on this imaging volume; please see separately dictated MRI thoracic spine report.    7.  Incompletely healed fracture left sacral ala.    8.  Moderate to severe spinal stenosis and bilateral neural foraminal stenosis at L4-L5.      9.  Mild spinal stenosis and moderate bilateral neural foraminal stenosis at L3-L4.     10. Moderate left-sided neural foraminal stenosis at L2-L3.      11. Moderate right-sided neural foraminal stenosis at L5-S1.     12.  Nonenhancing epidural collection posteriorly at L3 may relate to recent epidural injection.  There is no associated enhancement or other specific finding to suggest epidural abscess.                       Physical Examination:        General Appearance:  alert, well appearing, and in no acute distress  Mental status: oriented to person, place, and time with normal affect  Head:  normocephalic, atraumatic. Eye: no icterus, redness, pupils equal and reactive, extraocular eye movements intact, conjunctiva clear  Ear: normal external ear, no discharge, hearing intact  Nose:  no drainage noted  Mouth: mucous membranes moist  Neck: supple, no carotid bruits, thyroid not palpable  Lungs: Bilateral equal air entry, clear to ausculation, no wheezing, rales or rhonchi, normal effort  Cardiovascular: normal rate, regular rhythm, no murmur, gallop, rub.   Abdomen: Soft, nontender, nondistended, normal bowel sounds, no hepatomegaly or splenomegaly  Neurologic: There are no new focal motor or sensory deficits, normal muscle tone and bulk, no abnormal sensation, normal speech, cranial nerves II through XII grossly intact  TTP across thoracic, lumbar and sacral area  Skin: No gross lesions, rashes, bruising or bleeding on exposed skin area  Extremities:  peripheral pulses palpable, no pedal edema or calf pain with palpationn    Assessment:        Primary Problem  Compression fracture of T12 vertebra [facility])    Active Hospital Problems    Diagnosis Date Noted    Atherosclerotic ulcer of aorta ([facility] [I70.0] 08/25/2018    Compression fracture of lumbar vertebra, closed, initial encounter ([facility] [S32.000A] 08/25/2018    GERD (gastroesophageal reflux disease) [K21.9] 08/25/2018    Osteoarthritis [M19.90] 08/25/2018    Spinal stenosis of lumbar region [M48.061] 08/25/2018    [facility] T12 [facility] [S22.080A] 08/24/2018       Plan:        Multiple compression fractures as detailed in MRI above  To OR in am surgery will be done by Dr Delfino Pedroza   Pain control  Resume chronic home meds  DVT and GI PPx for now  History of  Perforating atherosclerotic ulcer of aorta treated with ASA and plavix, cardiology cleared the patient with intermediate risk   Chantel Crumb ok with ASA to be continued  Hold Lovenox in am      Halima Horn MD  8/26/2018  8:04 AM

## 2018-08-26 NOTE — PLAN OF CARE
[facility]    Second Visit Note  For more detailed information please refer to the progress note of the day      8/26/2018    4:47 PM    Name:   Tera Campa  MRN:     [id number]     Issa:      [de-identified]   Room:   [facility]50-26   Day:  2  Admit Date:  8/24/2018 11:26 PM    PCP:   Heike Shaver MD  Code Status:  Full Code        Pt vitals were reviewed   New labs were reviewed   Patient was seen,episode of confusion after getting Benadryl, yelling at the nurse,staff. Patient insist to use the bathroom , explained to her and her  2 daughters ( one of them is the POA) risk of fall. They still wanted to help support her to the bathroom to calm her down  . They supported her to bathroom and back with no fall.    She then calmeddown    Updated plan :     DC Benadryl  prefer oral [place] vs IV,though she tolerated Dilaudid fineyesterday  To OR in am   No more walking to bathroom  till surgery done   Add Haldol hanna Ramirez MD  8/26/2018  4:47 PM

## 2018-08-26 NOTE — CONSULTS
Aicha Menard      CC/Reason for consult:  Multiple vertebral compression fractures    HPI:      The patient is a [de-identified] y.o. female presenting as a transfer from [facility] after suffering from an increasing amount of back pain over the past 5 days. Patient states on Tuesday she was moving boxes and materials at home, the following day she began to complain of increasing low and mid back pain with some radiation laterally around her ribs. Patient states she did not sustain a fall. Patient notes that she has had pervious vertebral compression fractures treated by Dr. Tank Menard with Kyphoplasty. Patient does endorses some pain and weakness in her legs which is worsened by the pain in her back. Patient denies numbness or tingling in her legs, bowel or bladder dysfunction. GCS 15. Vitals were reviewed. Patient does not complain of any other orthopedic issues.     Past Medical History:    Past Medical History:   Diagnosis Date    Anemia     Arteriosclerosis     Arthritis     Atherosclerotic ulcer of aorta (HCC)     Bradycardia     Compression fracture of body of thoracic vertebra (HCC)     Constipation     Fibromyalgia     GERD (gastroesophageal reflux disease)     Hyperlipidemia     Osteoarthritis     Osteoporosis     Palpitations     Polymyalgia (HCC)      Past Surgical History:    Past Surgical History:   Procedure Laterality Date    CATARACT REMOVAL      CHOLECYSTECTOMY      FIXATION KYPHOPLASTY      FOOT SURGERY Bilateral     HAMMER TOE SURGERY Bilateral     PARTIAL HYSTERECTOMY      IN COLONOSCOPY W/BIOPSY SINGLE/MULTIPLE N/A 4/26/2018    COLONOSCOPY WITH BIOPSY performed by Alma Rosa Beck MD at [address] [facility] Right     TONSILLECTOMY      TOTAL KNEE ARTHROPLASTY Right     total right knee replacement     UPPER GASTROINTESTINAL ENDOSCOPY      UPPER GASTROINTESTINAL ENDOSCOPY N/A 4/26/2018    EGD DILATION SAVORY performed by George Culver Toney Rodriguez MD at [facility]     Medications Prior to Admission:   Prior to Admission medications    Medication Sig Start Date End Date Taking? Authorizing Provider   amLODIPine (NORVASC) 5 MG tablet Take 5 mg by mouth daily    Historical Provider, MD   meloxicam (MOBIC) 7.5 MG tablet Take 7.5 mg by mouth daily    Historical Provider, MD   HYDROcodone-acetaminophen (NORCO) 5-325 MG per tablet Take 1 tablet by mouth every 6 hours as needed for Pain. Lucho Andrade Historical Provider, MD   ranitidine (ZANTAC) 150 MG tablet Take 150 mg by mouth    Historical Provider, MD   predniSONE (DELTASONE) 10 MG tablet Take 15 mg by mouth    Historical Provider, MD   pantoprazole (PROTONIX) 40 MG tablet  9/11/17   Historical Provider, MD   gabapentin (NEURONTIN) 100 MG capsule 4 times daily. . 10/10/17   Historical Provider, MD   aspirin 325 MG tablet Take 325 mg by mouth    Historical Provider, MD   atorvastatin (LIPITOR) 20 MG tablet Take 20 mg by mouth    Historical Provider, MD   clopidogrel (PLAVIX) 75 MG tablet Take 75 mg by mouth    Historical Provider, MD   docusate sodium (COLACE) 100 MG capsule Take 100 mg by mouth    Historical Provider, MD   gemfibrozil (LOPID) 600 MG tablet Take 600 mg by mouth    Historical Provider, MD   linaclotide (LINZESS) 145 MCG capsule Take 145 mcg by mouth    Historical Provider, MD   oxybutynin (DITROPAN) 5 MG tablet Take 5 mg by mouth    Historical Provider, MD     Allergies:    Adhesive tape; Shellfish-derived products; Codeine; Penicillins; Pregabalin; and Sulfa antibiotics    Social History:   Social History     Social History    Marital status:       Spouse name: N/A    Number of children: N/A    Years of education: N/A     Social History Main Topics    Smoking status: Never Smoker    Smokeless tobacco: Never Used    Alcohol use Not on file    Drug use: Unknown    Sexual activity: Not on file     Other Topics Concern    Not on file     Social History Narrative    No narrative on file     Family History:  Family History   Problem Relation Age of Onset    Asthma Daughter     Diabetes Son     Heart Attack Mother         PAD/PVD    Prostate Cancer Brother      REVIEW OF SYSTEMS:   Constitutional: Negative for fever and chills. HENT: Negative for congestion. Eyes: Negative for blurred vision and double vision. Respiratory: Negative for cough, shortness of breath and wheezing. Cardiovascular: Negative for chest pain and palpitations. Gastrointestinal: Negative for nausea. Negative for vomiting. Musculoskeletal: Positive for mid and low back pain. Skin: Negative for itching and rash. Neurological: Negative for dizziness, sensory change and headaches. Psychiatric/Behavioral: Negative for depression and suicidal ideas. PHYSICAL EXAM:  Blood pressure (!) 161/56, pulse 66, temperature 98.4 °F (36.9 °C), temperature source Oral, resp. rate 15, height 5' (1.524 m), weight 133 lb 13.1 oz (60.7 kg), SpO2 97 %. Gen: alert and oriented, NAD, cooperative, resting in bed comfortably    Head: normocephalic atraumatic     Neck: supple    Chest: Non labored breathing. Cardiovascular: Regular rate, no dependent edema, distal pulses 2+    Respiratory: Chest symmetric, no accessory muscle use, normal respirations    Back: mild kyphotic curvature noted to the proximal thoracic spine. TTP noted to the T8 area as well as to the thoracic/lumbar junction as well as the mid lumbar area. No appreciable step-offs or deformities. Back remains without erythema, swelling or warmth    BUE:  strength 4/5 bilaterally. Patient able to actively flex and extend all digits, wrists, elbows and shoulders. Radial/median/ulnar/AIN/PIN motor complex intact grossly bilaterally. C4-T1 sensory nerve distributions intact grossly bilaterally bilaterally. Radial pulses 2+ w/ BCR bilaterally.     BLE: Patient able to actively flex and extend all toes, plantar and dorsiflex the ankles, flex and extend the knees. EHL/FHL/TA/GSC motor complex intact grossly. L4-S1 sensation intact grossly bilaterally. DP 2+ w/ BCR bilaterally. LABS:  Recent Labs      08/25/18   0537   WBC  9.0   HGB  9.8*   HCT  30.8*   PLT  288   INR  0.9   NA  143   K  4.0   BUN  28*   CREATININE  0.93*   GLUCOSE  97      Radiology:   Radiographic reports from [facility] demonstrate the following findings:  Superior end plate fracture L5  Inferior endplate fracture L4  Inferior end plate fracture L3  Inferior end plate fracture L1  Superior end plate fracture J96  Inferior end plate fracture Y8  Left sacral ala fracture     A/P: [de-identified] y.o. female being seen for multiple vertebral compression fractures    -Patient will require acute surgical intervention for pain relief and return to function  -Plan for intervention Monday with Dr. Fiorella Bowen  -AAT  -IM on as primary team  -Appreciate recommendation regarding medical management as well as clearance for surgery if applicable  -Pain control per primary team, patient notes good relief with norco and dilaudid  -Patient may apply ice or heat to the affected areas for pain and swelling relief as she can tolerate  -Will discuss need for further imaging with Dr. Fiorella Bowen and order appropriately  -DVT ppx: Lovenox, ASA. Ok to continue chemical AC from orthopedic standpoint. Will hold the evening prior to surgery  -Will discuss case with Dr. Fiorella Bowen  -Please page DO Ortho with any questions or concerns      Reviewed Subjective section with patient who did agree and confirmed everything documented. Discussed plan and patient expressed understanding of diagnosis and prognosis with plan as stated. All questions answered.       Colton Pepe DO   Orthopedic Surgery Resident PGY-2  [facility]

## 2018-08-26 NOTE — PROGRESS NOTES
prior to surgery  -Please page DO Ortho with any questions or concerns      Sameera Matute DO   Orthopedic Surgery Resident PGY-2  [facility]

## 2018-08-26 NOTE — PROGRESS NOTES
Physical Therapy  DATE: 2018    NAME: Simon Edwards  MRN: [id number]   : 1935    Patient not seen this date for Physical Therapy due to:  [] Blood transfusion in progress  [] Hemodialysis  []  Patient Declined  [] Spine Precautions   [] Strict Bedrest  [x] Surgery/ Procedure-planned surgery on 18-will hold eval until then-christa 18  [] Testing      [] Other        [] PT being discontinued at this time. Patient independent. No further needs. [] PT being discontinued at this time as the patient has been transferred to palliative care. No further needs.     Kayleen Moore, PT

## 2018-08-26 NOTE — CONSULTS
Cardiology Consult           Date of Admission:  8/24/2018  Date of Consultation:  8/26/2018      PCP:  Lex Woods MD      Chief Complaint: Preop risk assessment    History of Present Illness:  Agatha Reyna is a [de-identified] y.o. female who presents with a T12 compression fracture and is scheduled for surgery tomorrow. We are asked to comment on her CV risk. She was recently give bendryl and is very somnolent at the moment. I am unable to obtain any history from her. EMR was reviewed. She has a history of a penetrating ulcer with mobile atheroma being treated medically with aspirin and Plavix. Per outpatient notes, she has deconditioning and is generally debilitated. Ortho note indicates they are ok with antiplatelet therapy being on board. PMH:   has a past medical history of Anemia; Arteriosclerosis; Arthritis; Atherosclerotic ulcer of aorta ([address] Bradycardia; Compression fracture of body of thoracic vertebra ([address] Constipation; Fibromyalgia; GERD (gastroesophageal reflux disease); Hyperlipidemia; Osteoarthritis; Osteoporosis; Palpitations; and Polymyalgia ([address] PSH:   has a past surgical history that includes Upper gastrointestinal endoscopy; Cholecystectomy; Total knee arthroplasty (Right); Cataract removal; Tonsillectomy; partial hysterectomy (cervix not removed); Rotator cuff repair (Right); Hammer toe surgery (Bilateral); Foot surgery (Bilateral); Fixation Kyphoplasty; pr colonoscopy w/biopsy single/multiple (N/A, 4/26/2018); and Upper gastrointestinal endoscopy (N/A, 4/26/2018). Allergies: Allergies   Allergen Reactions    Adhesive Tape Other (See Comments)     Tears pt. Skin/blisters    Shellfish-Derived Products Anaphylaxis     Difficulty breathing    Codeine     Penicillins     Pregabalin     Sulfa Antibiotics         Home Meds:    Prior to Admission medications    Medication Sig Start Date End Date Taking?  Authorizing Provider   amLODIPine (NORVASC) 5 MG tablet [address], APRN - CNP   600 mg at 08/26/18 0830    linaclotide (LINZESS) capsule 145 mcg  145 mcg Oral QAM [facility], APRN - CNP        oxybutynin (DITROPAN) tablet 5 mg  5 mg Oral BID [facility], APRN - CNP   5 mg at 08/26/18 1016    pantoprazole (PROTONIX) tablet 40 mg  40 mg Oral QAM [facility], APRN - CNP   40 mg at 08/26/18 0830    famotidine (PEPCID) tablet 20 mg  20 mg Oral BID [facility], APRN - CNP   20 mg at 08/26/18 1016    aspirin EC tablet 81 mg  81 mg Oral Daily [facility], APRN - CNP   81 mg at 08/26/18 1016    [START ON 8/27/2018] clindamycin (CLEOCIN) 900 mg in dextrose 5 % 50 mL IVPB  900 mg Intravenous On Call to [address] [place] [address] DO Jose        diphenhydrAMINE (BENADRYL) injection 25 mg  25 mg Intravenous Q6H PRN [facility], APRN - CNP        ammonium lactate (AMLACTIN) 12 % cream   Topical Q2H PRN [facility], APRN - CNP        acetaminophen (TYLENOL) tablet 650 mg  650 mg Oral Q4H PRN [facility], APRN - CNP        bisacodyl (DULCOLAX) suppository 10 mg  10 mg Rectal Daily PRN [facility], APRN - CNP        enoxaparin (LOVENOX) injection 40 mg  40 mg Subcutaneous Daily [facility], APRN - CNP   40 mg at 08/26/18 1007    magnesium hydroxide (MILK OF MAGNESIA) 400 MG/5ML suspension 30 mL  30 mL Oral Daily PRN [facility], APRN - [facility]        magnesium sulfate 1 g in dextrose 5% 100 mL IVPB  1 g Intravenous PRN [facility], APRN - CNP        nicotine (NICODERM CQ) 21 MG/24HR 1 patch  1 patch Transdermal Daily PRN [facility], APRN - CNP        ondansetron [facility]) injection 4 mg  4 mg Intravenous Q6H PRN [facility], APRN - CNP        potassium chloride (KLOR-CON M) extended release tablet 40 mEq  40 mEq Oral PRN [facility], APRN - CNP        Or    potassium chloride 20 MEQ/15ML (10%) oral solution 40 mEq  40 mEq Oral PRN Gerarda Liv, APRN - CNP        Or    potassium chloride 10 mEq/100 mL IVPB (Peripheral Line)  10 mEq Intravenous PRN Gerarda Liv, APRN - CNP        sodium chloride flush 0.9 % injection 10 mL  10 mL Intravenous 2 times per day Gerarda Liv, APRN - CNP   10 mL at 08/26/18 1015    sodium chloride flush 0.9 % injection 10 mL  10 mL Intravenous PRN Gerarda Liv, APRN - CNP        HYDROcodone-acetaminophen (NORCO) 5-325 MG per tablet 1 tablet  1 tablet Oral Q4H PRN Gerarda Liv, APRN - CNP        Or    HYDROcodone-acetaminophen (NORCO) 5-325 MG per tablet 2 tablet  2 tablet Oral Q4H PRN Gerarda Liv, APRN - CNP   2 tablet at 08/26/18 0511    HYDROmorphone (DILAUDID) injection 0.25 mg  0.25 mg Intravenous Q3H PRN Gerarda Liv, APRN - CNP        Or    HYDROmorphone (DILAUDID) injection 0.5 mg  0.5 mg Intravenous Q3H PRN Gerarda Liv, APRN - CNP   0.5 mg at 08/26/18 1312       Social History:       TOBACCO:   reports that she has never smoked. She has never used smokeless tobacco.  ETOH:   has no alcohol history on file. DRUGS:  has no drug history on file. OCCUPATION:          Family Histroy:     Family History   Problem Relation Age of Onset    Asthma Daughter     Diabetes Son     Heart Attack Mother         PAD/PVD    Prostate Cancer Brother            Review of Systems:   · Unable to obtain. Patient somnolent and not providing any history. Physical Exam    Vital Signs: /66   Pulse 71   Temp 97.9 °F (36.6 °C) (Oral)   Resp 14   Ht 5' (1.524 m)   Wt 133 lb 13.1 oz (60.7 kg)   SpO2 97%   BMI 26.13 kg/m²        Admission Weight: 133 lb 13.1 oz (60.7 kg)     General appearance: somnolent    Head: Normocephalic, without obvious abnormality, atraumatic    Eyes: Conjunctivae/corneas clear. PERRL, EOM's intact.  Fundi benign    Neck: no adenopathy, no carotid bruit, no JVD, supple, Thank you.       Electronically signed by Jose Newton MD on 8/26/2018 at 11:47 AM

## 2018-08-26 NOTE — PLAN OF CARE
Problem: Pain:  Goal: Pain level will decrease  Pain level will decrease   Outcome: Met This Shift    Goal: Control of acute pain  Control of acute pain   Outcome: Ongoing  Pain improved with prn pain medication.       Problem: Falls - Risk of:  Goal: Will remain free from falls  Will remain free from falls   Outcome: Met This Shift    Goal: Absence of physical injury  Absence of physical injury   Outcome: Met This Shift

## 2018-08-27 ENCOUNTER — APPOINTMENT (OUTPATIENT)
Dept: GENERAL RADIOLOGY | Age: 83
DRG: 517 | End: 2018-08-27
Attending: INTERNAL MEDICINE
Payer: MEDICARE

## 2018-08-27 ENCOUNTER — ANESTHESIA (OUTPATIENT)
Dept: OPERATING ROOM | Age: 83
DRG: 517 | End: 2018-08-27
Payer: MEDICARE

## 2018-08-27 VITALS — DIASTOLIC BLOOD PRESSURE: 97 MMHG | SYSTOLIC BLOOD PRESSURE: 216 MMHG | TEMPERATURE: 94.5 F | OXYGEN SATURATION: 100 %

## 2018-08-27 PROCEDURE — 2709999900 HC NON-CHARGEABLE SUPPLY: Performed by: ORTHOPAEDIC SURGERY

## 2018-08-27 PROCEDURE — 1200000000 HC SEMI PRIVATE

## 2018-08-27 PROCEDURE — 2500000003 HC RX 250 WO HCPCS: Performed by: ORTHOPAEDIC SURGERY

## 2018-08-27 PROCEDURE — 99232 SBSQ HOSP IP/OBS MODERATE 35: CPT | Performed by: INTERNAL MEDICINE

## 2018-08-27 PROCEDURE — 2580000003 HC RX 258: Performed by: SPECIALIST

## 2018-08-27 PROCEDURE — 2720000010 HC SURG SUPPLY STERILE: Performed by: ORTHOPAEDIC SURGERY

## 2018-08-27 PROCEDURE — 6360000004 HC RX CONTRAST MEDICATION: Performed by: ORTHOPAEDIC SURGERY

## 2018-08-27 PROCEDURE — C1894 INTRO/SHEATH, NON-LASER: HCPCS | Performed by: ORTHOPAEDIC SURGERY

## 2018-08-27 PROCEDURE — A9576 INJ PROHANCE MULTIPACK: HCPCS | Performed by: ORTHOPAEDIC SURGERY

## 2018-08-27 PROCEDURE — 3600000014 HC SURGERY LEVEL 4 ADDTL 15MIN: Performed by: ORTHOPAEDIC SURGERY

## 2018-08-27 PROCEDURE — C1713 ANCHOR/SCREW BN/BN,TIS/BN: HCPCS | Performed by: ORTHOPAEDIC SURGERY

## 2018-08-27 PROCEDURE — 3600000004 HC SURGERY LEVEL 4 BASE: Performed by: ORTHOPAEDIC SURGERY

## 2018-08-27 PROCEDURE — 3209999900 FLUORO FOR SURGICAL PROCEDURES

## 2018-08-27 PROCEDURE — 7100000001 HC PACU RECOVERY - ADDTL 15 MIN: Performed by: ORTHOPAEDIC SURGERY

## 2018-08-27 PROCEDURE — 6360000002 HC RX W HCPCS: Performed by: STUDENT IN AN ORGANIZED HEALTH CARE EDUCATION/TRAINING PROGRAM

## 2018-08-27 PROCEDURE — 2580000003 HC RX 258: Performed by: ORTHOPAEDIC SURGERY

## 2018-08-27 PROCEDURE — 7100000000 HC PACU RECOVERY - FIRST 15 MIN: Performed by: ORTHOPAEDIC SURGERY

## 2018-08-27 PROCEDURE — 2500000003 HC RX 250 WO HCPCS: Performed by: SPECIALIST

## 2018-08-27 PROCEDURE — 3700000001 HC ADD 15 MINUTES (ANESTHESIA): Performed by: ORTHOPAEDIC SURGERY

## 2018-08-27 PROCEDURE — 6370000000 HC RX 637 (ALT 250 FOR IP): Performed by: NURSE PRACTITIONER

## 2018-08-27 PROCEDURE — 2580000003 HC RX 258: Performed by: NURSE PRACTITIONER

## 2018-08-27 PROCEDURE — 3700000000 HC ANESTHESIA ATTENDED CARE: Performed by: ORTHOPAEDIC SURGERY

## 2018-08-27 PROCEDURE — 94762 N-INVAS EAR/PLS OXIMTRY CONT: CPT

## 2018-08-27 PROCEDURE — 6360000002 HC RX W HCPCS: Performed by: SPECIALIST

## 2018-08-27 DEVICE — CEMENT C01A KYPHX HV-R BONE CEMENT EN
Type: IMPLANTABLE DEVICE | Site: SPINE LUMBAR | Status: FUNCTIONAL
Brand: KYPHON® HV-R® BONE CEMENT

## 2018-08-27 RX ORDER — FENTANYL CITRATE 50 UG/ML
INJECTION, SOLUTION INTRAMUSCULAR; INTRAVENOUS PRN
Status: DISCONTINUED | OUTPATIENT
Start: 2018-08-27 | End: 2018-08-27 | Stop reason: SDUPTHER

## 2018-08-27 RX ORDER — BUPIVACAINE HYDROCHLORIDE AND EPINEPHRINE 5; 5 MG/ML; UG/ML
INJECTION, SOLUTION EPIDURAL; INTRACAUDAL; PERINEURAL PRN
Status: DISCONTINUED | OUTPATIENT
Start: 2018-08-27 | End: 2018-08-27 | Stop reason: HOSPADM

## 2018-08-27 RX ORDER — ONDANSETRON 2 MG/ML
INJECTION INTRAMUSCULAR; INTRAVENOUS PRN
Status: DISCONTINUED | OUTPATIENT
Start: 2018-08-27 | End: 2018-08-27 | Stop reason: SDUPTHER

## 2018-08-27 RX ORDER — CLINDAMYCIN PHOSPHATE 150 MG/ML
INJECTION, SOLUTION INTRAVENOUS PRN
Status: DISCONTINUED | OUTPATIENT
Start: 2018-08-27 | End: 2018-08-27 | Stop reason: SDUPTHER

## 2018-08-27 RX ORDER — PROPOFOL 10 MG/ML
INJECTION, EMULSION INTRAVENOUS PRN
Status: DISCONTINUED | OUTPATIENT
Start: 2018-08-27 | End: 2018-08-27 | Stop reason: SDUPTHER

## 2018-08-27 RX ORDER — FAMOTIDINE 20 MG/1
20 TABLET, FILM COATED ORAL DAILY
Status: DISCONTINUED | OUTPATIENT
Start: 2018-08-28 | End: 2018-08-28 | Stop reason: HOSPADM

## 2018-08-27 RX ORDER — FENTANYL CITRATE 50 UG/ML
INJECTION, SOLUTION INTRAMUSCULAR; INTRAVENOUS CONTINUOUS PRN
Status: DISCONTINUED | OUTPATIENT
Start: 2018-08-27 | End: 2018-08-27

## 2018-08-27 RX ORDER — DEXAMETHASONE SODIUM PHOSPHATE 4 MG/ML
INJECTION, SOLUTION INTRA-ARTICULAR; INTRALESIONAL; INTRAMUSCULAR; INTRAVENOUS; SOFT TISSUE PRN
Status: DISCONTINUED | OUTPATIENT
Start: 2018-08-27 | End: 2018-08-27 | Stop reason: SDUPTHER

## 2018-08-27 RX ORDER — SODIUM CHLORIDE, SODIUM LACTATE, POTASSIUM CHLORIDE, CALCIUM CHLORIDE 600; 310; 30; 20 MG/100ML; MG/100ML; MG/100ML; MG/100ML
INJECTION, SOLUTION INTRAVENOUS CONTINUOUS PRN
Status: DISCONTINUED | OUTPATIENT
Start: 2018-08-27 | End: 2018-08-27 | Stop reason: SDUPTHER

## 2018-08-27 RX ORDER — LIDOCAINE HYDROCHLORIDE 10 MG/ML
INJECTION, SOLUTION INFILTRATION; PERINEURAL PRN
Status: DISCONTINUED | OUTPATIENT
Start: 2018-08-27 | End: 2018-08-27 | Stop reason: SDUPTHER

## 2018-08-27 RX ORDER — ROCURONIUM BROMIDE 10 MG/ML
INJECTION, SOLUTION INTRAVENOUS PRN
Status: DISCONTINUED | OUTPATIENT
Start: 2018-08-27 | End: 2018-08-27 | Stop reason: SDUPTHER

## 2018-08-27 RX ORDER — MAGNESIUM HYDROXIDE 1200 MG/15ML
LIQUID ORAL CONTINUOUS PRN
Status: COMPLETED | OUTPATIENT
Start: 2018-08-27 | End: 2018-08-27

## 2018-08-27 RX ADMIN — Medication 2 G: at 23:47

## 2018-08-27 RX ADMIN — LIDOCAINE HYDROCHLORIDE 50 MG: 10 INJECTION, SOLUTION EPIDURAL; INFILTRATION; INTRACAUDAL; PERINEURAL at 15:41

## 2018-08-27 RX ADMIN — PHENYLEPHRINE HYDROCHLORIDE 100 MCG: 10 INJECTION INTRAVENOUS at 16:54

## 2018-08-27 RX ADMIN — GABAPENTIN 200 MG: 100 CAPSULE ORAL at 23:47

## 2018-08-27 RX ADMIN — ONDANSETRON HYDROCHLORIDE 4 MG: 2 INJECTION, SOLUTION INTRAMUSCULAR; INTRAVENOUS at 17:00

## 2018-08-27 RX ADMIN — PROPOFOL 140 MG: 10 INJECTION, EMULSION INTRAVENOUS at 15:41

## 2018-08-27 RX ADMIN — FENTANYL CITRATE 50 MCG: 50 INJECTION INTRAMUSCULAR; INTRAVENOUS at 15:41

## 2018-08-27 RX ADMIN — PHENYLEPHRINE HYDROCHLORIDE 100 MCG: 10 INJECTION INTRAVENOUS at 16:49

## 2018-08-27 RX ADMIN — CLINDAMYCIN PHOSPHATE 900 MG: 150 INJECTION, SOLUTION INTRAMUSCULAR; INTRAVENOUS at 15:48

## 2018-08-27 RX ADMIN — OXYBUTYNIN CHLORIDE 5 MG: 5 TABLET ORAL at 23:47

## 2018-08-27 RX ADMIN — ATORVASTATIN CALCIUM 20 MG: 20 TABLET, FILM COATED ORAL at 23:47

## 2018-08-27 RX ADMIN — PHENYLEPHRINE HYDROCHLORIDE 100 MCG: 10 INJECTION INTRAVENOUS at 16:20

## 2018-08-27 RX ADMIN — PHENYLEPHRINE HYDROCHLORIDE 100 MCG: 10 INJECTION INTRAVENOUS at 16:30

## 2018-08-27 RX ADMIN — DEXAMETHASONE SODIUM PHOSPHATE 4 MG: 4 INJECTION, SOLUTION INTRAMUSCULAR; INTRAVENOUS at 16:06

## 2018-08-27 RX ADMIN — SODIUM CHLORIDE, POTASSIUM CHLORIDE, SODIUM LACTATE AND CALCIUM CHLORIDE: 600; 310; 30; 20 INJECTION, SOLUTION INTRAVENOUS at 15:36

## 2018-08-27 RX ADMIN — ROCURONIUM BROMIDE 40 MG: 10 INJECTION INTRAVENOUS at 15:41

## 2018-08-27 RX ADMIN — PHENYLEPHRINE HYDROCHLORIDE 200 MCG: 10 INJECTION INTRAVENOUS at 15:49

## 2018-08-27 RX ADMIN — PHENYLEPHRINE HYDROCHLORIDE 100 MCG: 10 INJECTION INTRAVENOUS at 16:05

## 2018-08-27 RX ADMIN — PHENYLEPHRINE HYDROCHLORIDE 100 MCG: 10 INJECTION INTRAVENOUS at 16:45

## 2018-08-27 RX ADMIN — Medication 10 ML: at 23:48

## 2018-08-27 RX ADMIN — PHENYLEPHRINE HYDROCHLORIDE 100 MCG: 10 INJECTION INTRAVENOUS at 15:56

## 2018-08-27 ASSESSMENT — PULMONARY FUNCTION TESTS
PIF_VALUE: 18
PIF_VALUE: 2
PIF_VALUE: 16
PIF_VALUE: 1
PIF_VALUE: 6
PIF_VALUE: 19
PIF_VALUE: 20
PIF_VALUE: 19
PIF_VALUE: 22
PIF_VALUE: 18
PIF_VALUE: 22
PIF_VALUE: 3
PIF_VALUE: 0
PIF_VALUE: 19
PIF_VALUE: 19
PIF_VALUE: 0
PIF_VALUE: 19
PIF_VALUE: 3
PIF_VALUE: 19
PIF_VALUE: 20
PIF_VALUE: 19
PIF_VALUE: 19
PIF_VALUE: 16
PIF_VALUE: 0
PIF_VALUE: 18
PIF_VALUE: 18
PIF_VALUE: 19
PIF_VALUE: 22
PIF_VALUE: 10
PIF_VALUE: 15
PIF_VALUE: 19
PIF_VALUE: 0
PIF_VALUE: 19
PIF_VALUE: 19
PIF_VALUE: 8
PIF_VALUE: 14
PIF_VALUE: 19
PIF_VALUE: 2
PIF_VALUE: 21
PIF_VALUE: 18
PIF_VALUE: 19
PIF_VALUE: 19
PIF_VALUE: 18
PIF_VALUE: 19
PIF_VALUE: 14
PIF_VALUE: 18
PIF_VALUE: 18
PIF_VALUE: 19
PIF_VALUE: 19
PIF_VALUE: 22
PIF_VALUE: 18
PIF_VALUE: 18
PIF_VALUE: 19
PIF_VALUE: 19
PIF_VALUE: 22
PIF_VALUE: 0
PIF_VALUE: 18
PIF_VALUE: 19
PIF_VALUE: 1
PIF_VALUE: 15
PIF_VALUE: 1
PIF_VALUE: 19
PIF_VALUE: 0
PIF_VALUE: 19
PIF_VALUE: 0
PIF_VALUE: 19
PIF_VALUE: 22
PIF_VALUE: 19
PIF_VALUE: 3
PIF_VALUE: 19
PIF_VALUE: 1
PIF_VALUE: 19
PIF_VALUE: 19
PIF_VALUE: 21
PIF_VALUE: 19
PIF_VALUE: 18
PIF_VALUE: 18
PIF_VALUE: 16
PIF_VALUE: 3
PIF_VALUE: 18
PIF_VALUE: 18
PIF_VALUE: 0
PIF_VALUE: 18
PIF_VALUE: 18

## 2018-08-27 ASSESSMENT — PAIN SCALES - GENERAL
PAINLEVEL_OUTOF10: 0

## 2018-08-27 ASSESSMENT — ENCOUNTER SYMPTOMS: SHORTNESS OF BREATH: 0

## 2018-08-27 NOTE — ANESTHESIA PRE PROCEDURE
sulfate 1 g in dextrose 5% 100 mL IVPB  1 g Intravenous PRN Zunilda Najjar, APRN - CNP        nicotine (NICODERM CQ) 21 MG/24HR 1 patch  1 patch Transdermal Daily PRN Zunilda Najjar, APRN - CNP        ondansetron [facility]) injection 4 mg  4 mg Intravenous Q6H PRN Zunilda Najjar, APRN - CNP        potassium chloride (KLOR-CON M) extended release tablet 40 mEq  40 mEq Oral PRN Zunilda Najjar, APRN - CNP        Or    potassium chloride 20 MEQ/15ML (10%) oral solution 40 mEq  40 mEq Oral PRN Zunilda Najjar, APRN - CNP        Or    potassium chloride 10 mEq/100 mL IVPB (Peripheral Line)  10 mEq Intravenous PRN Zunilda Najjar, APRN - CNP        sodium chloride flush 0.9 % injection 10 mL  10 mL Intravenous 2 times per day Zunilda Najjar, APRN - CNP   10 mL at 08/26/18 1015    sodium chloride flush 0.9 % injection 10 mL  10 mL Intravenous PRN Zunilda Najjar, APRN - CNP        HYDROcodone-acetaminophen (NORCO) 5-325 MG per tablet 1 tablet  1 tablet Oral Q4H PRN Zunilda Najjar, APRN - CNP   1 tablet at 08/26/18 1648    Or    HYDROcodone-acetaminophen (NORCO) 5-325 MG per tablet 2 tablet  2 tablet Oral Q4H PRN Zunilda Najjar, APRN - CNP   2 tablet at 08/26/18 2152    HYDROmorphone (DILAUDID) injection 0.25 mg  0.25 mg Intravenous Q3H PRN Zunilda Najjar, APRN - CNP        Or    HYDROmorphone (DILAUDID) injection 0.5 mg  0.5 mg Intravenous Q3H PRN Zunilda Najjar, APRN - CNP   0.5 mg at 08/26/18 6327       Allergies: Allergies   Allergen Reactions    Adhesive Tape Other (See Comments)     Tears pt.  Skin/blisters    Shellfish-Derived Products Anaphylaxis     Difficulty breathing    Codeine     Penicillins     Pregabalin     Sulfa Antibiotics        Problem List:    Patient Active Problem List   Diagnosis Code    Compression fracture of T12 vertebra ([facility] S22.080A    Atherosclerotic ulcer of aorta ([address] I70.0    Compression fracture of lumbar vertebra, closed, initial encounter ([facility] S32.000A    GERD (gastroesophageal reflux disease) K21.9    Osteoarthritis M19.90    Spinal stenosis of lumbar region M48.061       Past Medical History:        Diagnosis Date    Anemia     Arteriosclerosis     Arthritis     Atherosclerotic ulcer of aorta (HCC)     Bradycardia     Compression fracture of body of thoracic vertebra (HCC)     Constipation     Fibromyalgia     GERD (gastroesophageal reflux disease)     Hyperlipidemia     Osteoarthritis     Osteoporosis     Palpitations     Polymyalgia (HCC)        Past Surgical History:        Procedure Laterality Date    CATARACT REMOVAL      CHOLECYSTECTOMY      FIXATION KYPHOPLASTY      FOOT SURGERY Bilateral     HAMMER TOE SURGERY Bilateral     PARTIAL HYSTERECTOMY      OK COLONOSCOPY W/BIOPSY SINGLE/MULTIPLE N/A 4/26/2018    COLONOSCOPY WITH BIOPSY performed by Arden Borja MD at [address] [place] [address] Right     TONSILLECTOMY      TOTAL KNEE ARTHROPLASTY Right     total right knee replacement     UPPER GASTROINTESTINAL ENDOSCOPY      UPPER GASTROINTESTINAL ENDOSCOPY N/A 4/26/2018    EGD DILATION SAVORY performed by Arden Borja MD at [facility]       Social History:    Social History   Substance Use Topics    Smoking status: Never Smoker    Smokeless tobacco: Never Used    Alcohol use Not on file                                Counseling given: Not Answered      Vital Signs (Current):   Vitals:    08/26/18 1014 08/26/18 1130 08/26/18 1545 08/26/18 2127   BP: 126/66 (!) 146/58 129/78 (!) 118/49   Pulse:  85 88    Resp:   15    Temp:  37.2 °C (99 °F) 36.9 °C (98.5 °F) 37.7 °C (99.8 °F)   TempSrc:  Oral Oral Oral   SpO2:  97% 99%    Weight:       Height:                                                  BP Readings from Last 3 Encounters:   08/26/18 (!) 118/49   07/24/18 139/67   04/26/18 137/81       NPO Status: BMI:   Wt Readings from Last 3 Encounters:   08/24/18 133 lb 13.1 oz (60.7 kg)   07/24/18 132 lb (59.9 kg)   04/26/18 138 lb (62.6 kg)     Body mass index is 26.13 kg/m². CBC:   Lab Results   Component Value Date    WBC 9.0 08/25/2018    RBC 2.93 08/25/2018    RBC 4.12 03/10/2012    HGB 9.8 08/25/2018    HCT 30.8 08/25/2018    .1 08/25/2018    RDW 14.6 08/25/2018     08/25/2018     03/10/2012       CMP:   Lab Results   Component Value Date     08/25/2018    K 4.0 08/25/2018     08/25/2018    CO2 17 08/25/2018    BUN 28 08/25/2018    CREATININE 0.93 08/25/2018    GFRAA >60 08/25/2018    LABGLOM 58 08/25/2018    GLUCOSE 97 08/25/2018    CALCIUM 8.6 08/25/2018       POC Tests: No results for input(s): POCGLU, POCNA, POCK, POCCL, POCBUN, POCHEMO, POCHCT in the last 72 hours. Coags:   Lab Results   Component Value Date    PROTIME 9.9 08/25/2018    INR 0.9 08/25/2018    APTT 25.4 03/12/2013       HCG (If Applicable): No results found for: PREGTESTUR, PREGSERUM, HCG, HCGQUANT     ABGs: No results found for: PHART, PO2ART, ZJU3WDC, NXS2GUS, BEART, V0USDQQE     Type & Screen (If Applicable):  No results found for: LABABO, LABRH    Anesthesia Evaluation   no history of anesthetic complications:   Airway:         Dental:          Pulmonary:Negative Pulmonary ROS       (-) COPD, asthma, shortness of breath and recent URI                           Cardiovascular:        (-) valvular problems/murmurs, past MI, CAD, CABG/stent, dysrhythmias,  angina,  CHF and orthopnea    ECG reviewed          Cleared by cardiology           ROS comment: Palpitations, bradycardia  Patient with atherosclerotic ulcer of the aorta. On ASA/plavix subsequently. [de-identified] y.o. female who presents with a T12 compression fracture and is scheduled for surgery tomorrow.      1.  Preoperative cardiac risk assessment: based on history and decreased functional capacity, patient

## 2018-08-27 NOTE — PROGRESS NOTES
Orthopedic Progress Note    Patient:  Karla High  YOB: 1935     [de-identified] y.o. female    Subjective:  Patient seen and examined this morning. No complaints or concerns at this time. All questions answered regarding surgery. No issue overnight  Pain controlled on current regimen  Denies fever, HA, CP, SOB, N/V, dysuria, numbness or tingling.   + BM / + flatus    Vitals reviewed, afebrile    Objective:   Vitals:    08/27/18 0356   BP: 126/60   Pulse: 80   Resp: 19   Temp: 97.3 °F (36.3 °C)   SpO2: 95%       Gen: NAD, cooperative  Cardiovascular: Regular rate, no dependent edema, distal pulses 2+  Respiratory: Chest symmetric, no accessory muscle use, normal respirations, no audible wheezes    BUE:  strength 5/5 bilaterally. Patient able to actively flex and extend all digits, wrists, elbows and shoulders. Radial/median/ulnar/AIN/PIN motor complex intact grossly bilaterally. C4-T1 sensory nerve distributions intact grossly bilaterally bilaterally. Radial pulses 2+ w/ BCR bilaterally.     BLE: Patient able to actively flex and extend all toes, plantar and dorsiflex the ankles, flex and extend the knees. EHL/FHL/TA/GSC motor complex intact grossly. L4-S1 sensation intact grossly bilaterally. DP 2+ w/ BCR bilaterally. Recent Labs      08/25/18   0537   WBC  9.0   HGB  9.8*   HCT  30.8*   PLT  288   INR  0.9   NA  143   K  4.0   BUN  28*   CREATININE  0.93*   GLUCOSE  97        Meds: Lovenox, ASA, Plavix   See rec for complete list    Impression/plan: [de-identified] y.o. female being seen for multiple vertebral compression fractures: T8, T12, L1, L3-L5     -Plan for OR today for T8,T12, L1, L3-L5.  -Consent in chart. Surgical site marked.  -Clindamycin OCTOR.   -NPO now  -AAT  -IM on as primary team, appreciate recommendations regarding medical management.  -Cardiology states intermediate risk, but cleared for surgery.  -Pain control per primary team.  -Patient may apply ice or heat to the affected areas

## 2018-08-27 NOTE — PROGRESS NOTES
Pt's daughter called out that her mom wont talk . Pt alert following commands  Lifted arms smiled able to stick tongue out. Pt not talking. Pt trying to write note to writer on hand pt given pen and paper pt wrote I want them out. Pt pointed to two daughters. Daughters stepped out pt states I need a break from them they keep asking too many questions  Pt walked to bathroom. And voided pt stated ok for daughters to come back in room. . Pt daughters back in room.  Dr Jelena Brennan here to assess pt no new orders bp143/84 84 94% 18

## 2018-08-27 NOTE — PROGRESS NOTES
Pharmacy Note     Renal Dose Adjustment    Nando Barron is a [de-identified] y.o. female. Pharmacist assessment of renally cleared medications. Recent Labs      08/25/18   0537   BUN  28*       Recent Labs      08/25/18   0537   CREATININE  0.93*       Estimated Creatinine Clearance: 39 mL/min (A) (based on SCr of 0.93 mg/dL (H)). Height:   Ht Readings from Last 1 Encounters:   08/24/18 5' (1.524 m)     Weight:  Wt Readings from Last 1 Encounters:   08/24/18 133 lb 13.1 oz (60.7 kg)       The following medication dose has been adjusted based upon renal function per P&T Guidelines:             Famotidine 20 mg twice daily, changed to 20 mg once daily.      Ayde Springer, PharmD 8/27/2018 8:25 AM

## 2018-08-27 NOTE — PLAN OF CARE
Problem: Falls - Risk of:  Goal: Will remain free from falls  Will remain free from falls   Outcome: Ongoing  Fall risk precautions in place. Bed in lowest position with wheels locked, bed alarm in place and activated, non-skid socks on pt, fall risk id on pt, call light in reach, pt encouraged to call before getting out of bed and for any other needs or c/o.

## 2018-08-27 NOTE — BRIEF OP NOTE
Brief Postoperative Note  ______________________________________________________________    Patient: Nando Barron  YOB: 1935  MRN: [id number]  Date of Procedure: 8/27/2018    Pre-Op Diagnosis: Vertebral compression fracture T8,T11,L2-L4    Post-Op Diagnosis: Same       Procedure(s):  KYPHOPLASTY,T8,T11,L2-L4,    Anesthesia: General    Surgeon(s):  Celeste Hanley MD     Resident(s):   Sebastian Collazo DO    Staff:  Scrub Person First: Gordy Hoffman     Estimated Blood Loss: 5  mL    Complications: None    Specimens:   None    Implants:    Implant Name Type Inv.  Item Serial No.  Lot No. LRB No. Used   CEMENT [facility] SPINAL KT Spine CEMENT [facility] SPINAL KT  [facility] [id number] N/A 1   CEMENT [facility] SPINAL KT Spine CEMENT [address] [place] [address] [id number] N/A 1         Drains: None     Findings: See op note for details    Sebastian Collazo DO  Date: 8/27/2018  Time: 5:07 PM

## 2018-08-27 NOTE — CARE COORDINATION
Discharge [facility] Case Management Department  Written by: Sunshine Blank RN    Patient Name: Manuelito Brooks  Attending Provider: Charlie Bravo MD  Admit Date: 2018 11:26 PM  MRN: [id number]  Account: [de-identified]                     : 1935  Discharge Date: 2018      Disposition: home with daughter    Sunshine Blank RN

## 2018-08-27 NOTE — PROGRESS NOTES
[facility]    Progress Note    8/27/2018    6:37 AM    Name:   Zenovia Schaumann  MRN:     [id number]     Acct:      [de-identified]   Room:   [address] [place] [address] Day:  3  Admit Date:  8/24/2018 11:26 PM    PCP:   Serafin Rogel MD  Code Status:  Full Code    Subjective:     C/C: Back Pain    Interval History Status: not changed. Patient has both daughters at bedside. Per their report, Fuentes Diaz had a terrible day yesterday after taking Iv benadryl. She was combative, confused, disoriented, agitated. Mickeal Rink Mickeal Rink \"    Today, she is lying in bed comfortably and laughing (short bursts of ?inappropriate laughter). Plan for surgery today. NPO in anticipation. I reviewed new lab results, imaging, and vitals summary from the past 24 hours. Also, I spoke with the RN caring for patient. Also, I reviewed all nursing/consult/specialty notes and addressed any concerns that may have been brought to light by Consultants, RNs, , or Social Workers. Brief History:     The patient is a [de-identified] y.o.  Non-/non  female who presents with No chief complaint on file.   and she is admitted to the hospital for the management of  compression fracture of T12. Has known h/o Osteoporosis with L1 kyphoplasty around easter of 2018 that was done by Dr Sofia Stovall. Presented to ER yesterday with acute back pain after moving something heavy, denies any numbness, tingling or incontinence,  chest pain, sob, nausea, vomiting, fever or chills.  MRI thoracic and lumbar spine showed   superior endplate fracture with some surrounding edema at L5, inferior endplate fracture with some surrounding edema at L4, inferior endplate fracture with    some surrounding edema at L3, and inferior endplate fracture with some surrounding edema at L1.  A superior endplate fracture at W83 .  here is some minimal compression deformity of the superior endplate of the N23 and inferior endplate of the T8.     Review of Systems:     Negative except for those highlighted:    CONSTITUTIONAL:  fevers, chills, sweats, fatigue, weight loss, generalized weakness  HEENT:  Vision changes, hearing changes, runny nose, throat pain  RESPIRATORY:  shortness of breath, cough, congestion, wheezing. CARDIOVASCULAR:  chest pain, palpitations  GASTROINTESTINAL:  nausea, vomiting, diarrhea, constipation, change in bowel habits, abdominal pain   GENITOURINARY:  difficulty of urination, burning with urination, frequency   INTEGUMENT:  rash, skin lesions, easy bruising   HEMATOLOGIC/LYMPHATIC:  swelling/edema   ALLERGIC/IMMUNOLOGIC:  urticaria , itching  ENDOCRINE:  increase in drinking, increase in urination, hot or cold intolerance  MUSCULOSKELETAL:  +back pain  NEUROLOGICAL:  headaches, dizziness, lightheadedness, numbness, pain, tingling extremities  BEHAVIOR/PSYCH:  depression, anxiety    Medications: Allergies: Allergies   Allergen Reactions    Adhesive Tape Other (See Comments)     Tears pt.  Skin/blisters    Shellfish-Derived Products Anaphylaxis     Difficulty breathing    Codeine     Penicillins     Pregabalin     Sulfa Antibiotics        Current Meds:   Scheduled Meds:    amLODIPine  5 mg Oral Daily    atorvastatin  20 mg Oral Nightly    clopidogrel  75 mg Oral Daily    docusate sodium  100 mg Oral BID    gabapentin  200 mg Oral TID    gemfibrozil  600 mg Oral BID WC    linaclotide  145 mcg Oral QAM AC    oxybutynin  5 mg Oral BID    pantoprazole  40 mg Oral QAM AC    famotidine  20 mg Oral BID    aspirin  81 mg Oral Daily    clindamycin (CLEOCIN) IV  900 mg Intravenous On Call to OR    enoxaparin  40 mg Subcutaneous Daily    sodium chloride flush  10 mL Intravenous 2 times per day     Continuous Infusions:   PRN Meds: haloperidol lactate, ammonium lactate, acetaminophen, bisacodyl, magnesium hydroxide, magnesium sulfate, nicotine, ondansetron, potassium chloride **OR** potassium chloride **OR** POCGLU in the last 72 hours. No results found for: SPECIAL  No results found for: CULTURE    No results found for: POCPH, PHART, PH, POCPCO2, HOO1DJT, PCO2, POCPO2, PO2ART, PO2, POCHCO3, IVC3QCB, HCO3, NBEA, PBEA, BEART, BE, THGBART, THB, LFR4ZRR, MPZF6TIG, K6SHFCTQ, O2SAT, FIO2    Radiology:  No results found. Physical Examination:        /60   Pulse 80   Temp 97.3 °F (36.3 °C) (Oral)   Resp 19   Ht 5' (1.524 m)   Wt 133 lb 13.1 oz (60.7 kg)   SpO2 95%   BMI 26.13 kg/m²   Temp (24hrs), Av.5 °F (36.9 °C), Min:97.3 °F (36.3 °C), Max:99.8 °F (37.7 °C)    No results for input(s): POCGLU in the last 72 hours. Intake/Output Summary (Last 24 hours) at 18 0266  Last data filed at 18 0636   Gross per 24 hour   Intake              600 ml   Output             1475 ml   Net             -875 ml       General Appearance:  alert, well appearing, and in no acute distress  Mental status: oriented to self and place  Head:  normocephalic, atraumatic. Eye: no icterus, redness, pupils equal and reactive, extraocular eye movements intact, conjunctiva clear  Ear: normal external ear, no discharge, hearing intact  Nose:  no drainage noted  Mouth: mucous membranes moist  Neck: supple, no carotid bruits, thyroid not palpable  Lungs: Bilateral equal air entry, clear to ausculation, no wheezing, rales or rhonchi, normal effort  Cardiovascular: normal rate, regular rhythm, no murmur, gallop, rub.   Abdomen: Soft, nontender, nondistended, normal bowel sounds, no hepatomegaly or splenomegaly  Neurologic: There are no new focal motor or sensory deficits, normal muscle tone and bulk, no abnormal sensation, normal speech, cranial nerves II through XII grossly intact  Skin: No gross lesions, rashes, bruising or bleeding on exposed skin area  Extremities:  peripheral pulses palpable, no pedal edema or calf pain with palpation      Assessment:        Principal Problem:    Compression fracture of T12 vertebra

## 2018-08-28 VITALS
WEIGHT: 133.82 LBS | BODY MASS INDEX: 26.27 KG/M2 | SYSTOLIC BLOOD PRESSURE: 107 MMHG | DIASTOLIC BLOOD PRESSURE: 80 MMHG | TEMPERATURE: 98 F | OXYGEN SATURATION: 94 % | HEART RATE: 71 BPM | HEIGHT: 60 IN | RESPIRATION RATE: 18 BRPM

## 2018-08-28 PROBLEM — M48.50XA PATHOLOGIC COMPRESSION FRACTURE OF SPINE (HCC): Status: ACTIVE | Noted: 2018-08-28

## 2018-08-28 PROCEDURE — 97166 OT EVAL MOD COMPLEX 45 MIN: CPT

## 2018-08-28 PROCEDURE — 2580000003 HC RX 258: Performed by: NURSE PRACTITIONER

## 2018-08-28 PROCEDURE — 6360000002 HC RX W HCPCS: Performed by: STUDENT IN AN ORGANIZED HEALTH CARE EDUCATION/TRAINING PROGRAM

## 2018-08-28 PROCEDURE — G8979 MOBILITY GOAL STATUS: HCPCS

## 2018-08-28 PROCEDURE — 99239 HOSP IP/OBS DSCHRG MGMT >30: CPT | Performed by: INTERNAL MEDICINE

## 2018-08-28 PROCEDURE — 6370000000 HC RX 637 (ALT 250 FOR IP): Performed by: NURSE PRACTITIONER

## 2018-08-28 PROCEDURE — G8978 MOBILITY CURRENT STATUS: HCPCS

## 2018-08-28 PROCEDURE — G8987 SELF CARE CURRENT STATUS: HCPCS

## 2018-08-28 PROCEDURE — 6360000002 HC RX W HCPCS: Performed by: NURSE PRACTITIONER

## 2018-08-28 PROCEDURE — 97535 SELF CARE MNGMENT TRAINING: CPT

## 2018-08-28 PROCEDURE — 94762 N-INVAS EAR/PLS OXIMTRY CONT: CPT

## 2018-08-28 PROCEDURE — G8988 SELF CARE GOAL STATUS: HCPCS

## 2018-08-28 RX ORDER — SENNOSIDES 8.6 MG
1 TABLET ORAL 2 TIMES DAILY
Qty: 60 TABLET | Refills: 0 | Status: SHIPPED | OUTPATIENT
Start: 2018-08-28 | End: 2019-01-23 | Stop reason: ALTCHOICE

## 2018-08-28 RX ORDER — OXYCODONE HYDROCHLORIDE AND ACETAMINOPHEN 5; 325 MG/1; MG/1
1 TABLET ORAL EVERY 6 HOURS PRN
Qty: 28 TABLET | Refills: 0 | Status: SHIPPED | OUTPATIENT
Start: 2018-08-28 | End: 2018-08-28 | Stop reason: HOSPADM

## 2018-08-28 RX ORDER — DIAZEPAM 5 MG/1
5 TABLET ORAL EVERY 8 HOURS PRN
Qty: 10 TABLET | Refills: 0 | Status: SHIPPED | OUTPATIENT
Start: 2018-08-28 | End: 2018-08-28 | Stop reason: HOSPADM

## 2018-08-28 RX ORDER — DOCUSATE SODIUM 100 MG/1
100 CAPSULE, LIQUID FILLED ORAL 2 TIMES DAILY PRN
Qty: 60 CAPSULE | Refills: 0 | Status: ON HOLD | OUTPATIENT
Start: 2018-08-28 | End: 2018-10-21 | Stop reason: HOSPADM

## 2018-08-28 RX ADMIN — DOCUSATE SODIUM 100 MG: 100 CAPSULE ORAL at 08:20

## 2018-08-28 RX ADMIN — HYDROCODONE BITARTRATE AND ACETAMINOPHEN 1 TABLET: 5; 325 TABLET ORAL at 00:21

## 2018-08-28 RX ADMIN — Medication 10 ML: at 05:16

## 2018-08-28 RX ADMIN — GABAPENTIN 200 MG: 100 CAPSULE ORAL at 08:20

## 2018-08-28 RX ADMIN — ASPIRIN 81 MG: 81 TABLET, DELAYED RELEASE ORAL at 08:20

## 2018-08-28 RX ADMIN — FAMOTIDINE 20 MG: 20 TABLET, FILM COATED ORAL at 08:20

## 2018-08-28 RX ADMIN — OXYBUTYNIN CHLORIDE 5 MG: 5 TABLET ORAL at 08:20

## 2018-08-28 RX ADMIN — GEMFIBROZIL 600 MG: 600 TABLET ORAL at 08:20

## 2018-08-28 RX ADMIN — Medication 2 G: at 05:16

## 2018-08-28 RX ADMIN — ENOXAPARIN SODIUM 40 MG: 40 INJECTION SUBCUTANEOUS at 08:20

## 2018-08-28 RX ADMIN — PANTOPRAZOLE SODIUM 40 MG: 40 TABLET, DELAYED RELEASE ORAL at 07:38

## 2018-08-28 RX ADMIN — AMLODIPINE BESYLATE 5 MG: 5 TABLET ORAL at 08:20

## 2018-08-28 RX ADMIN — CLOPIDOGREL 75 MG: 75 TABLET, FILM COATED ORAL at 08:20

## 2018-08-28 ASSESSMENT — PAIN DESCRIPTION - PROGRESSION
CLINICAL_PROGRESSION: NOT CHANGED

## 2018-08-28 ASSESSMENT — PAIN DESCRIPTION - FREQUENCY: FREQUENCY: INTERMITTENT

## 2018-08-28 ASSESSMENT — PAIN DESCRIPTION - PAIN TYPE: TYPE: SURGICAL PAIN

## 2018-08-28 ASSESSMENT — PAIN DESCRIPTION - DESCRIPTORS: DESCRIPTORS: DISCOMFORT;ACHING

## 2018-08-28 ASSESSMENT — PAIN DESCRIPTION - ONSET: ONSET: ON-GOING

## 2018-08-28 ASSESSMENT — PAIN SCALES - GENERAL
PAINLEVEL_OUTOF10: 6
PAINLEVEL_OUTOF10: 1

## 2018-08-28 ASSESSMENT — PAIN DESCRIPTION - LOCATION: LOCATION: BACK

## 2018-08-28 NOTE — PROGRESS NOTES
Pt does not want script for colace percocet or valium pt states will take norco which she has at home.  Percocet valium and colace script tore up and put in shredder  box

## 2018-08-28 NOTE — PROGRESS NOTES
breath, cough, congestion, wheezing. CARDIOVASCULAR:  chest pain, palpitations  GASTROINTESTINAL:  nausea, vomiting, diarrhea, constipation, change in bowel habits, abdominal pain   GENITOURINARY:  difficulty of urination, burning with urination, frequency   INTEGUMENT:  rash, skin lesions, easy bruising   HEMATOLOGIC/LYMPHATIC:  swelling/edema   ALLERGIC/IMMUNOLOGIC:  urticaria , itching  ENDOCRINE:  increase in drinking, increase in urination, hot or cold intolerance  MUSCULOSKELETAL:  +back pain  NEUROLOGICAL:  headaches, dizziness, lightheadedness, numbness, pain, tingling extremities  BEHAVIOR/PSYCH:  depression, anxiety    Medications: Allergies: Allergies   Allergen Reactions    Adhesive Tape Other (See Comments)     Tears pt. Skin/blisters    Benadryl [Diphenhydramine]      Pt became confused and combative. IV benadryl, not oral    Shellfish-Derived Products Anaphylaxis     Difficulty breathing    Codeine     Penicillins     Pregabalin     Sulfa Antibiotics        Current Meds:   Scheduled Meds:     Continuous Infusions:   PRN Meds:     Data:     Past Medical History:   has a past medical history of Anemia; Arteriosclerosis; Arthritis; Atherosclerotic ulcer of aorta ([address] Bradycardia; Compression fracture of body of thoracic vertebra ([address] Constipation; Fibromyalgia; GERD (gastroesophageal reflux disease); Hyperlipidemia; Osteoarthritis; Osteoporosis; Palpitations; and Polymyalgia ([address] Social History:   reports that she has never smoked.  She has never used smokeless tobacco.     Family History:   Family History   Problem Relation Age of Onset    Asthma Daughter     Diabetes Son     Heart Attack Mother         PAD/PVD    Prostate Cancer Brother        Vitals:  /80   Pulse 71   Temp 98 °F (36.7 °C) (Oral)   Resp 18   Ht 5' (1.524 m)   Wt 133 lb 13.1 oz (60.7 kg)   SpO2 94%   BMI 26.13 kg/m²   Temp (24hrs), Av °F (36.1 °C), Min:94.5 °F (34.7 °C), Max:98 °F (36.7 control  Resume chronic home meds  DVT and GI PPx for now  History of  Perforating atherosclerotic ulcer of aorta treated with ASA and plavix, cardiology cleared the patient with intermediate risk   Roddie Bless ok with ASA to be continued  Benadryl allergy.      IV Fluids: ,    Nutrition:         Consultations:   IP CONSULT TO ORTHOPEDIC SURGERY  IP CONSULT TO CARDIOLOGY      Maxx Yan MD  8/28/2018  5:00 PM

## 2018-08-28 NOTE — FLOWSHEET NOTE
Patient was awake and alert. She was in good spirits because she is going home today. She told me that she is in the hospital because of back problems. She told me that she is feeling much better but she was told by the doctor to be careful not to fall or to lift anything over 5 pounds. Her daughter, with whom she lives, was with her in the room and will take her home later. Patient seemed glad to see the  and accepted my offer to give her a prayer for her journey home. 08/28/18 1113   Encounter Summary   Services provided to: Patient and family together   Place of [address], None   [employer] No   Continue Visiting (8/28/18)   Complexity of Encounter Low   Length of Encounter 15 minutes   Spiritual Assessment Completed Yes   Routine   Type Initial   Assessment Calm; Approachable; Hopeful   Intervention Active listening;Explored feelings, thoughts, concerns;Prayer   Outcome Acceptance;Comfort;Expressed gratitude;Encouraged; Hopeful;Receptive

## 2018-08-28 NOTE — PROGRESS NOTES
Frequency: Intermittent  Pain Intervention(s): Ambulation/Increased Activity  Response to Pain Intervention: Patient Satisfied     Social/Functional History  Social/Functional History  Lives With: Daughter (and son in law)  Type of Home: House  Home Layout: Two level, 1/2 bath on main level, Bed/Bath upstairs  Home Access: Stairs to enter with rails  Entrance Stairs - Number of Steps: 5  Entrance Stairs - Rails: Right  Bathroom Shower/Tub: Tub/Shower unit  Bathroom Toilet: Standard  Bathroom Equipment: Shower chair, Grab bars in shower  Bathroom Accessibility: Accessible  Home Equipment: 4 wheeled walker, [address] Help From: Family  ADL Assistance: Independent  Homemaking Assistance: Needs assistance  Homemaking Responsibilities: No (family performs all)  Ambulation Assistance: Independent (uses w/c for community distances)  Transfer Assistance: Independent  Active : No  Mode of Transportation: Family  Occupation: Retired  Leisure & Hobbies: Enjoys spending time with family    Objective   Vision: Within Functional Limits  Hearing: Within functional limits    Orientation  Overall Orientation Status: Within Functional Limits     Balance  Sitting Balance: Stand by assistance  Standing Balance: Contact guard assistance  Standing Balance  Time: ~8 minutes  Activity: functional mobility within hospital room/hallway and to/from bathroom, toilet transfer/toilet hygiene, standing sink side for hand hygiene  Comment: CGA with use of RW; pt mildy unsteady however with no LOB, pt with complaint of fatigue following moderate activity    Toilet Transfers  Toilet - Technique: Ambulating (with use of RW)  Equipment Used: Grab bars  Toilet Transfer: Contact guard assistance    ADL  Feeding: Independent  Grooming: Stand by assistance (standing sinkside for hand hygiene)  UE Bathing: Stand by assistance  LE Bathing: Contact guard assistance  UE Dressing: Stand by assistance (seated EOB to [facility] gown)  LE Dressing: Contact guard assistance (seated EOB to don/doff socks)  Toileting: Contact guard assistance (for LB clothing mngt, toilet transfer and toilet hygiene)    RUE Tone: Normotonic  LUE Tone: Normotonic  Movements Are Fluid And Coordinated: Yes     Bed mobility  Supine to Sit: Stand by assistance  Sit to Supine: Stand by assistance  Scooting: Stand by assistance     Transfers  Stand Step Transfers: Contact guard assistance  Sit to stand: Contact guard assistance  Stand to sit: Contact guard assistance  Transfer Comments: with use of RW; pt required min VCs for RW mngt/safety awareness. Pt and dtr educated for pt to use 4WW at all times at discharge, pt and dtr verbalize understanding. Cognition  Overall Cognitive Status: WFL     Sensation  Overall Sensation Status: WFL      LUE AROM : WFL  RUE AROM : WFL  Gross LUE Strength: WFL  Gross RUE Strength: WFL     Assessment   Performance deficits / Impairments: Decreased functional mobility ; Decreased ADL status; Decreased endurance;Decreased balance;Decreased high-level IADLs  Prognosis: Good  Decision Making: Medium Complexity  Patient Education: OT Services/OT POC, Safety Awareness/Fall Prevention, Safety with Transfers/RW Mngt, Spinal Precautions, ADL Techniques, Home Safety, good return  REQUIRES OT FOLLOW UP: Yes  Activity Tolerance  Activity Tolerance: Patient Tolerated treatment well  Safety Devices  Safety Devices in place: Yes  Type of devices: Bed alarm in place;Call light within reach;Nurse notified; Left in bed  Restraints  Initially in place: No         Plan   Plan  Times per week: 3-4x/wk    G-Code  OT G-codes  Functional Assessment Tool Used: [facility]  Score: 19/24  Functional Limitation: Self care  Self Care Current Status (): At least 40 percent but less than 60 percent impaired, limited or restricted  Self Care Goal Status ():  At least 20 percent but less than 40 percent impaired, limited or restricted    Goals  Short term goals  Time Frame for Short term goals: Pt will, by discharge:   Short term goal 1: perform functional transfers/functional mobility with mod IND using RW  Short term goal 2: independently demo safety awareness during ADLs and functional transfers/functional mobility  Short term goal 3: demo 15+ minutes standing tolerance with use of RW for increased participation in ADLs  Short term goal 4: perform grooming/UB ADL tasks with setup  Short term goal 5: perform toileting and LB ADL tasks with SBA     Therapy Time   Individual Concurrent Group Co-treatment   Time In 0907         Time Out 0926         Minutes 19             Discharge recommendations discussed with patient during initial evaluation.     Cirilo Shine, OTR/L

## 2018-08-28 NOTE — PROGRESS NOTES
Physical Therapy    Facility/Department: [address] ORTHO/MED SURG  Initial Assessment    NAME: Nasima Das  : 1935  MRN: [id number]    Date of Service: 2018    Discharge Recommendations:  Home with assist PRN       Impression/plan: [de-identified] y.o. female being seen for:   multiple vertebral compression fractures: T8, T11, L2-L4 s/p Kyphoplasty on () POD#1      Patient Diagnosis(es): The encounter diagnosis was Post-op pain. has a past medical history of Anemia; Arteriosclerosis; Arthritis; Atherosclerotic ulcer of aorta ([address] Bradycardia; Compression fracture of body of thoracic vertebra ([address] Constipation; Fibromyalgia; GERD (gastroesophageal reflux disease); Hyperlipidemia; Osteoarthritis; Osteoporosis; Palpitations; and Polymyalgia ([address] has a past surgical history that includes Upper gastrointestinal endoscopy; Cholecystectomy; Total knee arthroplasty (Right); Cataract removal; Tonsillectomy; partial hysterectomy (cervix not removed); Rotator cuff repair (Right); Hammer toe surgery (Bilateral); Foot surgery (Bilateral); Fixation Kyphoplasty; pr colonoscopy w/biopsy single/multiple (N/A, 2018); Upper gastrointestinal endoscopy (N/A, 2018); and Fixation Kyphoplasty (2018). Restrictions  Restrictions/Precautions  Restrictions/Precautions: Fall Risk, General Precautions  Vision/Hearing  Vision: Within Functional Limits  Hearing: Within functional limits     Subjective  General  Family / Caregiver Present: No  Follows Commands: Within Functional Limits  Subjective  Subjective: Pt resting in bed when therapy entered, agreeable to ambulate.  Denies pain at rest.   Pain Screening  Patient Currently in Pain: Denies  Vital Signs  Patient Currently in Pain: Denies       Orientation  Orientation  Overall Orientation Status: Within Normal Limits    Social/Functional History  Social/Functional History  Lives With: Daughter (and son in law)  Type of Home: House  Home Layout: Two level, 1/2 bath

## 2018-08-28 NOTE — DISCHARGE SUMMARY
tablet  Commonly known as:  PLAVIX     gabapentin 100 MG capsule  Commonly known as:  NEURONTIN     gemfibrozil 600 MG tablet  Commonly known as:  LOPID     HYDROcodone-acetaminophen 5-325 MG per tablet  Commonly known as:  NORCO     linaclotide 145 MCG capsule  Commonly known as:  LINZESS     meloxicam 7.5 MG tablet  Commonly known as:  MOBIC     oxybutynin 5 MG tablet  Commonly known as:  DITROPAN     pantoprazole 40 MG tablet  Commonly known as:  PROTONIX     predniSONE 10 MG tablet  Commonly known as:  DELTASONE     ranitidine 150 MG tablet  Commonly known as:  ZANTAC           Where to Get Your Medications      You can get these medications from any pharmacy    Bring a paper prescription for each of these medications  · docusate sodium 100 MG capsule  · senna 8.6 MG Tabs tablet         Time Spent on discharge is  31 mins in patient examination, evaluation, counseling as well as medication reconciliation, prescriptions for required medications, discharge plan and follow up. Electronically signed by   Sruthi Lopez MD  8/28/2018  5:02 PM      Thank you Dr. Jeanne Puri MD for the opportunity to be involved in this patient's care.

## 2018-08-28 NOTE — CARE COORDINATION
Discharge Report    [facility])  Clinical Case Management Department  Written by: Tushar Rodriguez RN    Patient Name: Anne Alvarez  Attending Provider: No att. providers found  Admit Date: 2018 11:26 PM  MRN: [id number]  Account: [de-identified]                     : 1935  Discharge Date: 2018      Disposition: home with daughter. Does not want HC.     Tushar Rodriguez RN

## 2018-08-29 NOTE — OP NOTE
each level, infiltrated the skin with Marcaine prior to  incision, and then passed the One-Step needle down to the junction of the  base of the pedicle and transverse process junction. We passed through the  base of the pedicle into the body. We used a curette then to further  expand towards the contralateral side. We did a unipedicular approach at  each segment to approach it. We expanded our balloon tamp. Sequential  inflation provided good bone void creation. At each segment, we did a like  procedure, infiltrating the skin, making a stab incision, and passing our  needle to the parapedicular position. We then easily passed to the  midline, placed our balloon tamp, and sequential inflation provided good  bone void creation of each level. Once we cannulated T8, T11, L2, L3, and  L4, we then mixed the cement. Once it was allowed to achieve a doughy  consistency, at each level, we then deflated the balloon tamp and placed  our bone fillers into the void. With a low-pressure fill done from  anterior to posterior, we filled the bone void and got a good fill and good  interdigitation. There was no extravasation or leakage. Once each segment  was fully filled, we allowed the cement to fully cure and set. Once it was  fully hard, we removed each cannula and infiltrated the skin with a final  dose of Marcaine. Incisions were then closed with Steri-Strips, Band-Aid  placed, and dressing applied. The patient was then transferred to the bed,  awoken from anesthesia, and brought to the recovery room in satisfactory  condition. Scarlett Torres MD    D: 08/28/2018 17:32:40       T: 08/28/2018 17:34:04     [id number]  Job#: [id number]     Doc#: [id number]    CC:   Jenny Gallego MD

## 2018-10-17 ENCOUNTER — OFFICE VISIT (OUTPATIENT)
Dept: PODIATRY | Age: 83
End: 2018-10-17
Payer: MEDICARE

## 2018-10-17 VITALS — WEIGHT: 143 LBS | HEIGHT: 60 IN | BODY MASS INDEX: 28.07 KG/M2

## 2018-10-17 DIAGNOSIS — M79.675 PAIN IN TOES OF BOTH FEET: ICD-10-CM

## 2018-10-17 DIAGNOSIS — B35.1 ONYCHOMYCOSIS: Primary | ICD-10-CM

## 2018-10-17 DIAGNOSIS — M79.674 PAIN IN TOES OF BOTH FEET: ICD-10-CM

## 2018-10-17 PROCEDURE — 11721 DEBRIDE NAIL 6 OR MORE: CPT | Performed by: PODIATRIST

## 2018-10-17 PROCEDURE — 99999 PR OFFICE/OUTPT VISIT,PROCEDURE ONLY: CPT | Performed by: PODIATRIST

## 2018-10-17 ASSESSMENT — ENCOUNTER SYMPTOMS
COLOR CHANGE: 0
VOMITING: 0
DIARRHEA: 0
CONSTIPATION: 0
NAUSEA: 0
BACK PAIN: 1

## 2018-10-17 NOTE — PROGRESS NOTES
[x] [x] [x] [x] [x] [x] [x] [x] [x] [x]  5 4 3 2 1 1 2 3 4 5                         Right                                        Left       Nails are painful 1-10 with direct palpation. Vascular:  DP/PT pulses palpable 2/4, Bilateral.    CFT <3 seconds to digits 1-5, Bilateral .   Hair growth absent to level of digits, Bilateral.    Neurological:  Sensation present to light touch to level of digits, Bilateral.    Assessment:  82 y.o. female with:   1. Onychomycosis    2. Pain in toes of both feet       Orders Placed This Encounter   Procedures    GA DEBRIDEMENT OF NAILS, 6 OR MORE         Plan:   Pt was evaluated and examined. Patient was given personalized discharge instructions. Nails 1-10 were debrided in length and thickness sharply with a nail nipper and  without incident. Pt will follow up in 3 months or sooner if any problems arise. Diagnosis was discussed with the pt and all of their questions were answered in detail. Proper foot hygiene and care was discussed with the pt. Patient to check feet daily and contact the office with any questions/problems/concerns. Other comorbidity noted and will be managed by PCP. Pain waiver discussed with patient and confirmed.    10/17/2018    Electronically signed by Damir Alfaro DPM on 10/17/2018 at 4:03 PM

## 2018-10-19 ENCOUNTER — APPOINTMENT (OUTPATIENT)
Dept: GENERAL RADIOLOGY | Age: 83
DRG: 690 | End: 2018-10-19
Payer: MEDICARE

## 2018-10-19 ENCOUNTER — HOSPITAL ENCOUNTER (INPATIENT)
Age: 83
LOS: 2 days | Discharge: HOME | DRG: 690 | End: 2018-10-21
Attending: EMERGENCY MEDICINE | Admitting: INTERNAL MEDICINE
Payer: MEDICARE

## 2018-10-19 DIAGNOSIS — R53.1 GENERAL WEAKNESS: Primary | ICD-10-CM

## 2018-10-19 LAB
-: NORMAL
ABSOLUTE EOS #: 0 K/UL (ref 0–0.4)
ABSOLUTE IMMATURE GRANULOCYTE: 0 K/UL (ref 0–0.3)
ABSOLUTE LYMPH #: 0.09 K/UL (ref 1–4.8)
ABSOLUTE MONO #: 0.66 K/UL (ref 0.1–0.8)
AMORPHOUS: NORMAL
ANION GAP SERPL CALCULATED.3IONS-SCNC: 17 MMOL/L (ref 9–17)
BACTERIA: NORMAL
BASOPHILS # BLD: 0 % (ref 0–2)
BASOPHILS ABSOLUTE: 0 K/UL (ref 0–0.2)
BILIRUBIN URINE: NEGATIVE
BUN BLDV-MCNC: 20 MG/DL (ref 8–23)
BUN/CREAT BLD: ABNORMAL (ref 9–20)
CALCIUM SERPL-MCNC: 8.7 MG/DL (ref 8.6–10.4)
CASTS UA: NORMAL /LPF (ref 0–8)
CHLORIDE BLD-SCNC: 99 MMOL/L (ref 98–107)
CO2: 19 MMOL/L (ref 20–31)
COLOR: YELLOW
COMMENT UA: ABNORMAL
CREAT SERPL-MCNC: 0.89 MG/DL (ref 0.5–0.9)
CRYSTALS, UA: NORMAL /HPF
DIFFERENTIAL TYPE: ABNORMAL
EOSINOPHILS RELATIVE PERCENT: 0 % (ref 1–4)
EPITHELIAL CELLS UA: NORMAL /HPF (ref 0–5)
GFR AFRICAN AMERICAN: >60 ML/MIN
GFR NON-AFRICAN AMERICAN: >60 ML/MIN
GFR SERPL CREATININE-BSD FRML MDRD: ABNORMAL ML/MIN/{1.73_M2}
GFR SERPL CREATININE-BSD FRML MDRD: ABNORMAL ML/MIN/{1.73_M2}
GLUCOSE BLD-MCNC: 93 MG/DL (ref 70–99)
GLUCOSE URINE: NEGATIVE
HCT VFR BLD CALC: 32.3 % (ref 36.3–47.1)
HEMOGLOBIN: 10.5 G/DL (ref 11.9–15.1)
IMMATURE GRANULOCYTES: 0 %
KETONES, URINE: NEGATIVE
LACTIC ACID, WHOLE BLOOD: 1.7 MMOL/L (ref 0.7–2.1)
LEUKOCYTE ESTERASE, URINE: NEGATIVE
LYMPHOCYTES # BLD: 1 % (ref 24–44)
MCH RBC QN AUTO: 31.9 PG (ref 25.2–33.5)
MCHC RBC AUTO-ENTMCNC: 32.5 G/DL (ref 28.4–34.8)
MCV RBC AUTO: 98.2 FL (ref 82.6–102.9)
MONOCYTES # BLD: 7 % (ref 1–7)
MORPHOLOGY: NORMAL
MUCUS: NORMAL
NITRITE, URINE: NEGATIVE
NRBC AUTOMATED: 0 PER 100 WBC
OTHER OBSERVATIONS UA: NORMAL
PDW BLD-RTO: 11.9 % (ref 11.8–14.4)
PH UA: 6.5 (ref 5–8)
PLATELET # BLD: 200 K/UL (ref 138–453)
PLATELET ESTIMATE: ABNORMAL
PMV BLD AUTO: 10 FL (ref 8.1–13.5)
POTASSIUM SERPL-SCNC: 5.4 MMOL/L (ref 3.7–5.3)
PROTEIN UA: ABNORMAL
RBC # BLD: 3.29 M/UL (ref 3.95–5.11)
RBC # BLD: ABNORMAL 10*6/UL
RBC UA: NORMAL /HPF (ref 0–4)
RENAL EPITHELIAL, UA: NORMAL /HPF
SEG NEUTROPHILS: 92 % (ref 36–66)
SEGMENTED NEUTROPHILS ABSOLUTE COUNT: 8.65 K/UL (ref 1.8–7.7)
SODIUM BLD-SCNC: 135 MMOL/L (ref 135–144)
SPECIFIC GRAVITY UA: 1.02 (ref 1–1.03)
TRICHOMONAS: NORMAL
TROPONIN INTERP: NORMAL
TROPONIN T: <0.03 NG/ML
TURBIDITY: CLEAR
URINE HGB: ABNORMAL
UROBILINOGEN, URINE: NORMAL
WBC # BLD: 9.4 K/UL (ref 3.5–11.3)
WBC # BLD: ABNORMAL 10*3/UL
WBC UA: NORMAL /HPF (ref 0–5)
YEAST: NORMAL

## 2018-10-19 PROCEDURE — 2580000003 HC RX 258: Performed by: NURSE PRACTITIONER

## 2018-10-19 PROCEDURE — 2580000003 HC RX 258: Performed by: EMERGENCY MEDICINE

## 2018-10-19 PROCEDURE — 1200000000 HC SEMI PRIVATE

## 2018-10-19 PROCEDURE — 85025 COMPLETE CBC W/AUTO DIFF WBC: CPT

## 2018-10-19 PROCEDURE — 84484 ASSAY OF TROPONIN QUANT: CPT

## 2018-10-19 PROCEDURE — 83605 ASSAY OF LACTIC ACID: CPT

## 2018-10-19 PROCEDURE — 87086 URINE CULTURE/COLONY COUNT: CPT

## 2018-10-19 PROCEDURE — 6360000002 HC RX W HCPCS: Performed by: NURSE PRACTITIONER

## 2018-10-19 PROCEDURE — 80048 BASIC METABOLIC PNL TOTAL CA: CPT

## 2018-10-19 PROCEDURE — 81001 URINALYSIS AUTO W/SCOPE: CPT

## 2018-10-19 PROCEDURE — 6360000002 HC RX W HCPCS: Performed by: EMERGENCY MEDICINE

## 2018-10-19 PROCEDURE — 6370000000 HC RX 637 (ALT 250 FOR IP): Performed by: NURSE PRACTITIONER

## 2018-10-19 PROCEDURE — 99285 EMERGENCY DEPT VISIT HI MDM: CPT

## 2018-10-19 PROCEDURE — 71045 X-RAY EXAM CHEST 1 VIEW: CPT

## 2018-10-19 PROCEDURE — 93005 ELECTROCARDIOGRAM TRACING: CPT

## 2018-10-19 PROCEDURE — 96374 THER/PROPH/DIAG INJ IV PUSH: CPT

## 2018-10-19 RX ORDER — SODIUM CHLORIDE 9 MG/ML
INJECTION, SOLUTION INTRAVENOUS CONTINUOUS
Status: DISCONTINUED | OUTPATIENT
Start: 2018-10-19 | End: 2018-10-20

## 2018-10-19 RX ORDER — SENNA PLUS 8.6 MG/1
1 TABLET ORAL 2 TIMES DAILY
Status: DISCONTINUED | OUTPATIENT
Start: 2018-10-19 | End: 2018-10-21 | Stop reason: HOSPADM

## 2018-10-19 RX ORDER — SODIUM CHLORIDE 0.9 % (FLUSH) 0.9 %
10 SYRINGE (ML) INJECTION EVERY 12 HOURS SCHEDULED
Status: DISCONTINUED | OUTPATIENT
Start: 2018-10-19 | End: 2018-10-21 | Stop reason: HOSPADM

## 2018-10-19 RX ORDER — BISACODYL 10 MG
10 SUPPOSITORY, RECTAL RECTAL DAILY PRN
Status: DISCONTINUED | OUTPATIENT
Start: 2018-10-19 | End: 2018-10-21 | Stop reason: HOSPADM

## 2018-10-19 RX ORDER — NICOTINE 21 MG/24HR
1 PATCH, TRANSDERMAL 24 HOURS TRANSDERMAL DAILY PRN
Status: DISCONTINUED | OUTPATIENT
Start: 2018-10-19 | End: 2018-10-21 | Stop reason: HOSPADM

## 2018-10-19 RX ORDER — ACETAMINOPHEN 325 MG/1
650 TABLET ORAL EVERY 4 HOURS PRN
Status: DISCONTINUED | OUTPATIENT
Start: 2018-10-19 | End: 2018-10-21 | Stop reason: HOSPADM

## 2018-10-19 RX ORDER — CIPROFLOXACIN 2 MG/ML
400 INJECTION, SOLUTION INTRAVENOUS EVERY 12 HOURS
Status: DISCONTINUED | OUTPATIENT
Start: 2018-10-19 | End: 2018-10-21 | Stop reason: CLARIF

## 2018-10-19 RX ORDER — OXYBUTYNIN CHLORIDE 5 MG/1
5 TABLET ORAL 2 TIMES DAILY
Status: DISCONTINUED | OUTPATIENT
Start: 2018-10-19 | End: 2018-10-21 | Stop reason: HOSPADM

## 2018-10-19 RX ORDER — DOCUSATE SODIUM 100 MG/1
100 CAPSULE, LIQUID FILLED ORAL DAILY
Status: DISCONTINUED | OUTPATIENT
Start: 2018-10-20 | End: 2018-10-21 | Stop reason: HOSPADM

## 2018-10-19 RX ORDER — AMLODIPINE BESYLATE 5 MG/1
5 TABLET ORAL DAILY
Status: DISCONTINUED | OUTPATIENT
Start: 2018-10-20 | End: 2018-10-21 | Stop reason: HOSPADM

## 2018-10-19 RX ORDER — POTASSIUM CHLORIDE 20 MEQ/1
40 TABLET, EXTENDED RELEASE ORAL PRN
Status: DISCONTINUED | OUTPATIENT
Start: 2018-10-19 | End: 2018-10-21 | Stop reason: HOSPADM

## 2018-10-19 RX ORDER — SODIUM CHLORIDE 0.9 % (FLUSH) 0.9 %
10 SYRINGE (ML) INJECTION PRN
Status: DISCONTINUED | OUTPATIENT
Start: 2018-10-19 | End: 2018-10-21 | Stop reason: HOSPADM

## 2018-10-19 RX ORDER — DOCUSATE SODIUM 100 MG/1
100 CAPSULE, LIQUID FILLED ORAL 2 TIMES DAILY PRN
Status: DISCONTINUED | OUTPATIENT
Start: 2018-10-19 | End: 2018-10-21 | Stop reason: HOSPADM

## 2018-10-19 RX ORDER — MAGNESIUM SULFATE 1 G/100ML
1 INJECTION INTRAVENOUS PRN
Status: DISCONTINUED | OUTPATIENT
Start: 2018-10-19 | End: 2018-10-21 | Stop reason: HOSPADM

## 2018-10-19 RX ORDER — POTASSIUM CHLORIDE 7.45 MG/ML
10 INJECTION INTRAVENOUS PRN
Status: DISCONTINUED | OUTPATIENT
Start: 2018-10-19 | End: 2018-10-21 | Stop reason: HOSPADM

## 2018-10-19 RX ORDER — POTASSIUM CHLORIDE 20MEQ/15ML
40 LIQUID (ML) ORAL PRN
Status: DISCONTINUED | OUTPATIENT
Start: 2018-10-19 | End: 2018-10-21 | Stop reason: HOSPADM

## 2018-10-19 RX ORDER — CLOPIDOGREL BISULFATE 75 MG/1
75 TABLET ORAL DAILY
Status: DISCONTINUED | OUTPATIENT
Start: 2018-10-20 | End: 2018-10-21 | Stop reason: HOSPADM

## 2018-10-19 RX ORDER — ONDANSETRON 2 MG/ML
4 INJECTION INTRAMUSCULAR; INTRAVENOUS EVERY 6 HOURS PRN
Status: DISCONTINUED | OUTPATIENT
Start: 2018-10-19 | End: 2018-10-21 | Stop reason: HOSPADM

## 2018-10-19 RX ORDER — ATORVASTATIN CALCIUM 20 MG/1
20 TABLET, FILM COATED ORAL NIGHTLY
Status: DISCONTINUED | OUTPATIENT
Start: 2018-10-19 | End: 2018-10-21 | Stop reason: HOSPADM

## 2018-10-19 RX ORDER — SODIUM CHLORIDE 9 MG/ML
INJECTION, SOLUTION INTRAVENOUS CONTINUOUS
Status: DISCONTINUED | OUTPATIENT
Start: 2018-10-19 | End: 2018-10-21 | Stop reason: HOSPADM

## 2018-10-19 RX ORDER — ASPIRIN 325 MG
325 TABLET ORAL DAILY
Status: DISCONTINUED | OUTPATIENT
Start: 2018-10-20 | End: 2018-10-21 | Stop reason: HOSPADM

## 2018-10-19 RX ORDER — KETOROLAC TROMETHAMINE 15 MG/ML
15 INJECTION, SOLUTION INTRAMUSCULAR; INTRAVENOUS ONCE
Status: COMPLETED | OUTPATIENT
Start: 2018-10-19 | End: 2018-10-19

## 2018-10-19 RX ADMIN — ATORVASTATIN CALCIUM 20 MG: 20 TABLET, FILM COATED ORAL at 23:28

## 2018-10-19 RX ADMIN — SODIUM CHLORIDE: 9 INJECTION, SOLUTION INTRAVENOUS at 22:04

## 2018-10-19 RX ADMIN — KETOROLAC TROMETHAMINE 15 MG: 15 INJECTION, SOLUTION INTRAMUSCULAR; INTRAVENOUS at 22:04

## 2018-10-19 RX ADMIN — CIPROFLOXACIN 400 MG: 2 INJECTION, SOLUTION INTRAVENOUS at 23:28

## 2018-10-19 RX ADMIN — SODIUM CHLORIDE: 9 INJECTION, SOLUTION INTRAVENOUS at 23:29

## 2018-10-19 RX ADMIN — ACETAMINOPHEN 650 MG: 325 TABLET ORAL at 23:28

## 2018-10-19 RX ADMIN — OXYBUTYNIN CHLORIDE 5 MG: 5 TABLET ORAL at 23:30

## 2018-10-19 ASSESSMENT — ENCOUNTER SYMPTOMS
EYE PAIN: 0
SHORTNESS OF BREATH: 0
COLOR CHANGE: 0
VOMITING: 0
SORE THROAT: 0
NAUSEA: 0
RHINORRHEA: 0
COUGH: 0
ABDOMINAL PAIN: 0

## 2018-10-19 ASSESSMENT — PAIN SCALES - GENERAL
PAINLEVEL_OUTOF10: 8
PAINLEVEL_OUTOF10: 7

## 2018-10-19 ASSESSMENT — PAIN DESCRIPTION - LOCATION: LOCATION: BACK

## 2018-10-19 ASSESSMENT — PAIN DESCRIPTION - PAIN TYPE: TYPE: CHRONIC PAIN

## 2018-10-19 NOTE — ED NOTES
Bed: 27  Expected date: 10/19/18  Expected time: 7:12 PM  Means of arrival: [facility] SURGICAL AND CARDIOVASCULAR [facility]  Comments:  [facility] [address], [facility]  10/19/18 9564

## 2018-10-20 PROBLEM — R06.09 DYSPNEA ON EXERTION: Status: ACTIVE | Noted: 2018-10-20

## 2018-10-20 PROBLEM — D64.9 ANEMIA: Status: ACTIVE | Noted: 2018-10-20

## 2018-10-20 PROBLEM — N30.00 ACUTE CYSTITIS: Status: ACTIVE | Noted: 2018-10-20

## 2018-10-20 LAB
ABSOLUTE RETIC #: 0.05 M/UL (ref 0.03–0.08)
ANION GAP SERPL CALCULATED.3IONS-SCNC: 14 MMOL/L (ref 9–17)
BNP INTERPRETATION: ABNORMAL
BUN BLDV-MCNC: 20 MG/DL (ref 8–23)
BUN/CREAT BLD: ABNORMAL (ref 9–20)
CALCIUM SERPL-MCNC: 7.8 MG/DL (ref 8.6–10.4)
CHLORIDE BLD-SCNC: 102 MMOL/L (ref 98–107)
CO2: 20 MMOL/L (ref 20–31)
CREAT SERPL-MCNC: 1.01 MG/DL (ref 0.5–0.9)
FERRITIN: 59 UG/L (ref 13–150)
FOLATE: 16.5 NG/ML
GFR AFRICAN AMERICAN: >60 ML/MIN
GFR NON-AFRICAN AMERICAN: 53 ML/MIN
GFR SERPL CREATININE-BSD FRML MDRD: ABNORMAL ML/MIN/{1.73_M2}
GFR SERPL CREATININE-BSD FRML MDRD: ABNORMAL ML/MIN/{1.73_M2}
GLUCOSE BLD-MCNC: 87 MG/DL (ref 70–99)
HCT VFR BLD CALC: 28.6 % (ref 36.3–47.1)
HEMOGLOBIN: 9.4 G/DL (ref 11.9–15.1)
IMMATURE RETIC FRACT: 15 % (ref 2.7–18.3)
INR BLD: 1
IRON SATURATION: 16 % (ref 20–55)
IRON: 48 UG/DL (ref 37–145)
MCH RBC QN AUTO: 32.3 PG (ref 25.2–33.5)
MCHC RBC AUTO-ENTMCNC: 32.9 G/DL (ref 28.4–34.8)
MCV RBC AUTO: 98.3 FL (ref 82.6–102.9)
NRBC AUTOMATED: 0 PER 100 WBC
PDW BLD-RTO: 11.9 % (ref 11.8–14.4)
PLATELET # BLD: 172 K/UL (ref 138–453)
PMV BLD AUTO: 10 FL (ref 8.1–13.5)
POTASSIUM SERPL-SCNC: 3.9 MMOL/L (ref 3.7–5.3)
PRO-BNP: 348 PG/ML
PROTHROMBIN TIME: 10.7 SEC (ref 9–12)
RBC # BLD: 2.91 M/UL (ref 3.95–5.11)
RETIC %: 1.5 % (ref 0.5–1.9)
RETIC HEMOGLOBIN: 34.2 PG (ref 28.2–35.7)
SODIUM BLD-SCNC: 136 MMOL/L (ref 135–144)
TOTAL IRON BINDING CAPACITY: 307 UG/DL (ref 250–450)
UNSATURATED IRON BINDING CAPACITY: 259 UG/DL (ref 112–347)
VITAMIN B-12: 230 PG/ML (ref 232–1245)
WBC # BLD: 6.1 K/UL (ref 3.5–11.3)

## 2018-10-20 PROCEDURE — 83540 ASSAY OF IRON: CPT

## 2018-10-20 PROCEDURE — 83550 IRON BINDING TEST: CPT

## 2018-10-20 PROCEDURE — 6370000000 HC RX 637 (ALT 250 FOR IP): Performed by: INTERNAL MEDICINE

## 2018-10-20 PROCEDURE — 85610 PROTHROMBIN TIME: CPT

## 2018-10-20 PROCEDURE — 85027 COMPLETE CBC AUTOMATED: CPT

## 2018-10-20 PROCEDURE — 97162 PT EVAL MOD COMPLEX 30 MIN: CPT

## 2018-10-20 PROCEDURE — 6370000000 HC RX 637 (ALT 250 FOR IP): Performed by: NURSE PRACTITIONER

## 2018-10-20 PROCEDURE — 85045 AUTOMATED RETICULOCYTE COUNT: CPT

## 2018-10-20 PROCEDURE — G8978 MOBILITY CURRENT STATUS: HCPCS

## 2018-10-20 PROCEDURE — 97530 THERAPEUTIC ACTIVITIES: CPT

## 2018-10-20 PROCEDURE — 2580000003 HC RX 258: Performed by: INTERNAL MEDICINE

## 2018-10-20 PROCEDURE — 82728 ASSAY OF FERRITIN: CPT

## 2018-10-20 PROCEDURE — 6360000002 HC RX W HCPCS: Performed by: NURSE PRACTITIONER

## 2018-10-20 PROCEDURE — 82607 VITAMIN B-12: CPT

## 2018-10-20 PROCEDURE — 1200000000 HC SEMI PRIVATE

## 2018-10-20 PROCEDURE — 83880 ASSAY OF NATRIURETIC PEPTIDE: CPT

## 2018-10-20 PROCEDURE — 80048 BASIC METABOLIC PNL TOTAL CA: CPT

## 2018-10-20 PROCEDURE — G8979 MOBILITY GOAL STATUS: HCPCS

## 2018-10-20 PROCEDURE — 99223 1ST HOSP IP/OBS HIGH 75: CPT | Performed by: INTERNAL MEDICINE

## 2018-10-20 PROCEDURE — 82746 ASSAY OF FOLIC ACID SERUM: CPT

## 2018-10-20 PROCEDURE — 36415 COLL VENOUS BLD VENIPUNCTURE: CPT

## 2018-10-20 RX ORDER — PREDNISONE 1 MG/1
5 TABLET ORAL DAILY
Status: DISCONTINUED | OUTPATIENT
Start: 2018-10-20 | End: 2018-10-21 | Stop reason: HOSPADM

## 2018-10-20 RX ORDER — HYDROCODONE BITARTRATE AND ACETAMINOPHEN 5; 325 MG/1; MG/1
2 TABLET ORAL EVERY 4 HOURS PRN
Status: DISCONTINUED | OUTPATIENT
Start: 2018-10-20 | End: 2018-10-21 | Stop reason: HOSPADM

## 2018-10-20 RX ORDER — GEMFIBROZIL 600 MG/1
600 TABLET, FILM COATED ORAL 2 TIMES DAILY WITH MEALS
Status: DISCONTINUED | OUTPATIENT
Start: 2018-10-20 | End: 2018-10-21 | Stop reason: HOSPADM

## 2018-10-20 RX ORDER — FAMOTIDINE 20 MG/1
20 TABLET, FILM COATED ORAL DAILY
Status: DISCONTINUED | OUTPATIENT
Start: 2018-10-20 | End: 2018-10-21 | Stop reason: HOSPADM

## 2018-10-20 RX ORDER — CALCIUM CARBONATE 200(500)MG
500 TABLET,CHEWABLE ORAL 3 TIMES DAILY PRN
Status: DISCONTINUED | OUTPATIENT
Start: 2018-10-20 | End: 2018-10-21

## 2018-10-20 RX ORDER — MELOXICAM 7.5 MG/1
7.5 TABLET ORAL DAILY
Status: DISCONTINUED | OUTPATIENT
Start: 2018-10-20 | End: 2018-10-21 | Stop reason: HOSPADM

## 2018-10-20 RX ORDER — GABAPENTIN 100 MG/1
100 CAPSULE ORAL 3 TIMES DAILY
Status: DISCONTINUED | OUTPATIENT
Start: 2018-10-20 | End: 2018-10-21 | Stop reason: HOSPADM

## 2018-10-20 RX ORDER — PANTOPRAZOLE SODIUM 40 MG/1
40 TABLET, DELAYED RELEASE ORAL
Status: DISCONTINUED | OUTPATIENT
Start: 2018-10-20 | End: 2018-10-21 | Stop reason: HOSPADM

## 2018-10-20 RX ADMIN — PREDNISONE 5 MG: 5 TABLET ORAL at 13:54

## 2018-10-20 RX ADMIN — OXYBUTYNIN CHLORIDE 5 MG: 5 TABLET ORAL at 09:20

## 2018-10-20 RX ADMIN — CLOPIDOGREL 75 MG: 75 TABLET, FILM COATED ORAL at 09:20

## 2018-10-20 RX ADMIN — ACETAMINOPHEN 650 MG: 325 TABLET ORAL at 07:54

## 2018-10-20 RX ADMIN — GABAPENTIN 100 MG: 100 CAPSULE ORAL at 13:54

## 2018-10-20 RX ADMIN — OXYBUTYNIN CHLORIDE 5 MG: 5 TABLET ORAL at 20:54

## 2018-10-20 RX ADMIN — HYDROCODONE BITARTRATE AND ACETAMINOPHEN 2 TABLET: 5; 325 TABLET ORAL at 20:54

## 2018-10-20 RX ADMIN — PANTOPRAZOLE SODIUM 40 MG: 40 TABLET, DELAYED RELEASE ORAL at 10:52

## 2018-10-20 RX ADMIN — CIPROFLOXACIN 400 MG: 2 INJECTION, SOLUTION INTRAVENOUS at 10:52

## 2018-10-20 RX ADMIN — ENOXAPARIN SODIUM 40 MG: 40 INJECTION SUBCUTANEOUS at 09:20

## 2018-10-20 RX ADMIN — ATORVASTATIN CALCIUM 20 MG: 20 TABLET, FILM COATED ORAL at 20:54

## 2018-10-20 RX ADMIN — GABAPENTIN 100 MG: 100 CAPSULE ORAL at 20:54

## 2018-10-20 RX ADMIN — HYDROCODONE BITARTRATE AND ACETAMINOPHEN 2 TABLET: 5; 325 TABLET ORAL at 13:55

## 2018-10-20 RX ADMIN — CIPROFLOXACIN 400 MG: 2 INJECTION, SOLUTION INTRAVENOUS at 23:18

## 2018-10-20 RX ADMIN — ASPIRIN 325 MG: 325 TABLET, COATED ORAL at 09:20

## 2018-10-20 RX ADMIN — MELOXICAM 7.5 MG: 7.5 TABLET ORAL at 13:54

## 2018-10-20 RX ADMIN — ANTACID TABLETS 500 MG: 500 TABLET, CHEWABLE ORAL at 10:52

## 2018-10-20 RX ADMIN — SODIUM CHLORIDE: 9 INJECTION, SOLUTION INTRAVENOUS at 13:57

## 2018-10-20 RX ADMIN — AMLODIPINE BESYLATE 5 MG: 5 TABLET ORAL at 09:20

## 2018-10-20 RX ADMIN — GEMFIBROZIL 600 MG: 600 TABLET ORAL at 16:56

## 2018-10-20 RX ADMIN — ANTACID TABLETS 500 MG: 500 TABLET, CHEWABLE ORAL at 17:43

## 2018-10-20 RX ADMIN — FAMOTIDINE 20 MG: 20 TABLET, FILM COATED ORAL at 13:54

## 2018-10-20 ASSESSMENT — PAIN DESCRIPTION - LOCATION: LOCATION: HEAD

## 2018-10-20 ASSESSMENT — PAIN SCALES - GENERAL
PAINLEVEL_OUTOF10: 9
PAINLEVEL_OUTOF10: 5

## 2018-10-20 ASSESSMENT — PAIN DESCRIPTION - PAIN TYPE: TYPE: ACUTE PAIN

## 2018-10-20 NOTE — H&P
[facility]    HISTORY AND PHYSICAL EXAMINATION            Date:   10/20/2018  Patient name:  Bindu Chiang  Date of admission:  10/19/2018  7:28 PM  MRN:   [id number]  Account:  [de-identified]  YOB: 1935  PCP:    Va Espinoza MD  Room:   0318/0318-01  Code Status:    Full Code    Chief Complaint:     Chief Complaint   Patient presents with    Fever       History Obtained From:     patient, electronic medical record    History of Present Illness: The patient is a 82 y.o. Non-/non  female who presents with Fever   and she is admitted to the hospital for the management of  Acute UTI/ cystitis. Per the ED:  Jennifer Fuentes is a 82 y.o. female who presents with chief complaint of  fever and generalized weakness. Daughter at bedside states that he went to [address] [place] [address] 2 days ago when she was diagnosed with UTI and started on nitrofurantoin. States that she's taking 2 doses of the medication so far. However today, the patient is complaining of fever and worsening of weakness. So much that she can't a weight on her own. Daughter states that she measured her mother's temperature at home using a thermometer at skin the forehead, and was read to be 104°. No antipyretics were given. Patient is a bit sweaty at baseline, requiring use of a walker. However, given the weakness, she is in the evening with a bit on her own. Patient denies any chest pain, shortness of breath, belly pain. Denies any sore throat and cough. Denies any dysuria, hematuria, frequency or urgency. Denies any changes in bowel movements. \"    She is still c/o bilateral leg pain and generalized weakness. She is c/o a frontal HA. Her son is in the room. She lives with her daughter and wants to go home.         Past Medical History:     Past Medical History:   Diagnosis Date    Anemia     Arteriosclerosis     Arthritis     Atherosclerotic

## 2018-10-20 NOTE — PROGRESS NOTES
Physical Therapy    [facility]  Initial Assessment    NAME: Tyron Waggoner  : 1935  MRN: [id number]    Date of Service: 10/20/2018  Chief Complaint   Patient presents with    Fever       Discharge Recommendations:  [address], Continue to assess pending progress,   If pt able to ambulate household functional distance, may be able to return home with 24 hour supervision or assist with Home health PT. Pt currently only able to ambulate 3ftx2 with CGA RW which is not a functional household distance, and therefore would be unsafe to return home at this time. PT Equipment Recommendations  Equipment Needed: No  Other: Owns walker    Patient Diagnosis(es): The encounter diagnosis was General weakness. has a past medical history of Anemia; Arteriosclerosis; Arthritis; Atherosclerotic ulcer of aorta ([address] Bradycardia; Compression fracture of body of thoracic vertebra ([address] Constipation; Fibromyalgia; GERD (gastroesophageal reflux disease); Hyperlipidemia; Osteoarthritis; Osteoporosis; Palpitations; and Polymyalgia ([address] has a past surgical history that includes Upper gastrointestinal endoscopy; Cholecystectomy; Total knee arthroplasty (Right); Cataract removal; Tonsillectomy; partial hysterectomy (cervix not removed); Rotator cuff repair (Right); Hammer toe surgery (Bilateral); Foot surgery (Bilateral); Fixation Kyphoplasty; pr colonoscopy w/biopsy single/multiple (N/A, 2018); Upper gastrointestinal endoscopy (N/A, 2018); Fixation Kyphoplasty (2018); and pr office/outpt visit,procedure only (N/A, 2018).     Restrictions  Restrictions/Precautions  Restrictions/Precautions: Fall Risk  Required Braces or Orthoses?: No  Position Activity Restriction  Other position/activity restrictions: up with assist  Vision/Hearing  Vision: Within Functional Limits  Hearing: Within functional limits     Subjective  General  Patient assessed for rehabilitation services?: Yes  Response To Previous Treatment: Not applicable  Family / Caregiver Present: Yes (daughter)  Follows Commands: Within Functional Limits  Subjective  Subjective: Pt supine in bed and agreeable to therapy this morning. Pt reports a headache this date. RN agreeable to therapy this morning. Pain Screening  Patient Currently in Pain: Yes  Pain Assessment  Pain Assessment: 0-10  Pain Level: 5  Pain Type: Acute pain  Pain Location: Head  Pain Intervention(s): Repositioned;Distraction; Ambulation/Increased activity  Response to Pain Intervention: Patient Satisfied  Vital Signs  Patient Currently in Pain: Yes       Orientation  Orientation  Overall Orientation Status: Within Functional Limits    Social/Functional History  Social/Functional History  Lives With: Family (Lives with daughter and son in law)  Type of Home: House  Home Layout: Two level, Bed/Bath upstairs, 1/2 bath on main level  Home Access: Stairs to enter with rails  Entrance Stairs - Number of Steps: 5  Entrance Stairs - Rails: Both  Bathroom Shower/Tub: Tub/Shower unit  Bathroom Toilet: Standard  Bathroom Equipment: Grab bars in shower, Shower chair  Home Equipment: Quad cane, 4 wheeled walker, Cony Cagey (Pt denies any recent use)  ADL Assistance: Independent  Homemaking Assistance: Independent  Homemaking Responsibilities: Yes  Meal Prep Responsibility: No  Laundry Responsibility: No  Cleaning Responsibility: No  Shopping Responsibility: Primary  Other (Comment): Pt's daughter does most household chores  Ambulation Assistance: Independent  Transfer Assistance: Independent  Active : Yes  Mode of Transportation: Car  Occupation: Retired  Type of occupation: Pharmacy Tech  Leisure & Hobbies: Likes to go out to eat  Additional Comments: Pt reports she has multiple family members who are supportive, but they do work out of the home. Daughter states they would be able to help some during the day as well.    Objective AROM RLE (degrees)  RLE AROM: WFL  AROM LLE (degrees)  LLE AROM : WFL  AROM RUE (degrees)  RUE AROM : WFL  AROM LUE (degrees)  LUE AROM : WFL  Strength RLE  Comment: Grossly 4/5 hip/ankle; 3/5 knee as pt's LEs are very tender to touch  Strength LLE  Comment: Grossly 4/5 hip/ankle; 3/5 knee as pt's LEs are very tender to touch  Strength RUE  Comment: Grossly 4-/5  Strength LUE  Comment: Grossly 4-/5     Sensation  Overall Sensation Status: WFL (Pt denies any numbness or tingling)  Bed mobility  Supine to Sit: Minimal assistance  Sit to Supine: Minimal assistance  Scooting: Contact guard assistance  Comment: HOB raised and use of hand rails  Transfers  Sit to Stand: Minimal Assistance  Stand to sit: Minimal Assistance  Ambulation  Ambulation?: Yes  Ambulation 1  Surface: level tile  Device: Rolling Walker  Assistance: Contact guard assistance  Quality of Gait: decreased endurance, shaking LEs-noted weakness but without buckling, decreased step length  Distance: 3ft forward/backward  Comments: Pt states she needs to sit as she isn't able to ambulate any farther. Stairs/Curb  Stairs?: No     Balance  Posture: Fair  Sitting - Static: Good  Sitting - Dynamic: Good  Standing - Static: Fair  Standing - Dynamic: Fair  Comments: Standing balance assessed with RW        Assessment   Body structures, Functions, Activity limitations: Decreased functional mobility ; Decreased strength;Decreased endurance;Decreased balance  Assessment: Pt with decreased balance and impaired mobility requiring min A with most aspects of mobility. Pt only able to ambulate 3ft CGA with RW. Pt would be unsafe to return home at this time secondary to pt lives in a two story house without 24hr help. Pt resistant to any additional help.    Prognosis: Good  Decision Making: Medium Complexity  Patient Education: Educated on importance of mobility  Barriers to Learning: None  REQUIRES PT FOLLOW UP: Yes  Activity Tolerance  Activity Tolerance: Patient

## 2018-10-20 NOTE — ED NOTES
Patients PIV started and blood work sent. Patients EKG is completed at this time. Patient and family aware of plan of care. Patient is difficult IV arlene and MD aware to add on troponin rather than attempt to get more blood.       Kain French RN  10/19/18 2013

## 2018-10-20 NOTE — ED PROVIDER NOTES
(PROTONIX) 40 MG tablet  9/11/17   Historical Provider, MD   gabapentin (NEURONTIN) 100 MG capsule 4 times daily. . 10/10/17   Historical Provider, MD   aspirin 325 MG tablet Take 325 mg by mouth    Historical Provider, MD   atorvastatin (LIPITOR) 20 MG tablet Take 20 mg by mouth    Historical Provider, MD   clopidogrel (PLAVIX) 75 MG tablet Take 75 mg by mouth    Historical Provider, MD   docusate sodium (COLACE) 100 MG capsule Take 100 mg by mouth    Historical Provider, MD   gemfibrozil (LOPID) 600 MG tablet Take 600 mg by mouth    Historical Provider, MD   linaclotide (LINZESS) 145 MCG capsule Take 145 mcg by mouth    Historical Provider, MD   oxybutynin (DITROPAN) 5 MG tablet Take 5 mg by mouth    Historical Provider, MD       REVIEW OF SYSTEMS    (2-9 systems for level 4, 10 or more for level 5)      Review of Systems   Constitutional: Positive for chills and fever. HENT: Negative for rhinorrhea and sore throat. Eyes: Negative for pain and visual disturbance. Respiratory: Negative for cough and shortness of breath. Cardiovascular: Negative for chest pain and palpitations. Gastrointestinal: Negative for abdominal pain, nausea and vomiting. Genitourinary: Negative for difficulty urinating and dysuria. Musculoskeletal: Negative for arthralgias and myalgias. Skin: Negative for color change and wound. Neurological: Positive for weakness. Negative for numbness and headaches. Psychiatric/Behavioral: Negative for behavioral problems and dysphoric mood. PHYSICAL EXAM   (up to 7 for level 4, 8 or more for level 5)      INITIAL VITALS:   BP (!) 103/56   Pulse 70   Temp 100.3 °F (37.9 °C) (Oral)   Resp 14   Ht 5' (1.524 m)   Wt 143 lb (64.9 kg)   SpO2 96%   BMI 27.93 kg/m²     Physical Exam   Constitutional: She is oriented to person, place, and time. She appears well-developed and well-nourished. No distress. HENT:   Head: Normocephalic and atraumatic.    Mouth/Throat: Oropharynx is senna (SENOKOT) tablet 8.6 mg    sodium chloride flush 0.9 % injection 10 mL    sodium chloride flush 0.9 % injection 10 mL    OR Linked Order Group     potassium chloride (KLOR-CON M) extended release tablet 40 mEq     potassium chloride 20 MEQ/15ML (10%) oral solution 40 mEq     potassium chloride 10 mEq/100 mL IVPB (Peripheral Line)    magnesium sulfate 1 g in dextrose 5% 100 mL IVPB    magnesium hydroxide (MILK OF MAGNESIA) 400 MG/5ML suspension 30 mL    bisacodyl (DULCOLAX) suppository 10 mg    ondansetron (ZOFRAN) injection 4 mg    nicotine (NICODERM CQ) 21 MG/24HR 1 patch     If indicated/pateint smokes    acetaminophen (TYLENOL) tablet 650 mg    0.9 % sodium chloride infusion    enoxaparin (LOVENOX) injection 40 mg    ciprofloxacin (CIPRO) IVPB 400 mg    ketorolac (TORADOL) injection 15 mg    0.9 % sodium chloride infusion       DIAGNOSTIC RESULTS / EMERGENCY DEPARTMENT COURSE / MDM     LABS:  Results for orders placed or performed during the hospital encounter of 10/19/18   CBC Auto Differential   Result Value Ref Range    WBC 9.4 3.5 - 11.3 k/uL    RBC 3.29 (L) 3.95 - 5.11 m/uL    Hemoglobin 10.5 (L) 11.9 - 15.1 g/dL    Hematocrit 32.3 (L) 36.3 - 47.1 %    MCV 98.2 82.6 - 102.9 fL    MCH 31.9 25.2 - 33.5 pg    MCHC 32.5 28.4 - 34.8 g/dL    RDW 11.9 11.8 - 14.4 %    Platelets 697 653 - 544 k/uL    MPV 10.0 8.1 - 13.5 fL    NRBC Automated 0.0 0.0 per 100 WBC    Differential Type NOT REPORTED     WBC Morphology NOT REPORTED     RBC Morphology NOT REPORTED     Platelet Estimate NOT REPORTED     Immature Granulocytes 0 0 %    Seg Neutrophils 92 (H) 36 - 66 %    Lymphocytes 1 (L) 24 - 44 %    Monocytes 7 1 - 7 %    Eosinophils % 0 (L) 1 - 4 %    Basophils 0 0 - 2 %    Absolute Immature Granulocyte 0.00 0.00 - 0.30 k/uL    Segs Absolute 8.65 (H) 1.8 - 7.7 k/uL    Absolute Lymph # 0.09 (L) 1.0 - 4.8 k/uL    Absolute Mono # 0.66 0.1 - 0.8 k/uL    Absolute Eos # 0.00 0.0 - 0.4 k/uL    Basophils # are mis-transcribed.)       Scott Matthew MD  Resident  10/19/18 8903

## 2018-10-20 NOTE — ED NOTES
Patient complaining of pain, MD aware, VS re assessed and MD notified of 99.3 temp. Orders to follow.       Davy Flores RN  10/19/18 8346

## 2018-10-21 VITALS
OXYGEN SATURATION: 95 % | WEIGHT: 137.6 LBS | SYSTOLIC BLOOD PRESSURE: 141 MMHG | BODY MASS INDEX: 27.01 KG/M2 | HEIGHT: 60 IN | HEART RATE: 60 BPM | DIASTOLIC BLOOD PRESSURE: 63 MMHG | TEMPERATURE: 97.7 F | RESPIRATION RATE: 18 BRPM

## 2018-10-21 PROBLEM — E53.8 VITAMIN B12 DEFICIENCY: Status: ACTIVE | Noted: 2018-10-21

## 2018-10-21 LAB
ANION GAP SERPL CALCULATED.3IONS-SCNC: 13 MMOL/L (ref 9–17)
BUN BLDV-MCNC: 19 MG/DL (ref 8–23)
BUN/CREAT BLD: ABNORMAL (ref 9–20)
CALCIUM SERPL-MCNC: 8.6 MG/DL (ref 8.6–10.4)
CHLORIDE BLD-SCNC: 108 MMOL/L (ref 98–107)
CO2: 19 MMOL/L (ref 20–31)
CREAT SERPL-MCNC: 0.78 MG/DL (ref 0.5–0.9)
CULTURE: NO GROWTH
GFR AFRICAN AMERICAN: >60 ML/MIN
GFR NON-AFRICAN AMERICAN: >60 ML/MIN
GFR SERPL CREATININE-BSD FRML MDRD: ABNORMAL ML/MIN/{1.73_M2}
GFR SERPL CREATININE-BSD FRML MDRD: ABNORMAL ML/MIN/{1.73_M2}
GLUCOSE BLD-MCNC: 100 MG/DL (ref 70–99)
HCT VFR BLD CALC: 28.3 % (ref 36.3–47.1)
HEMOGLOBIN: 9.1 G/DL (ref 11.9–15.1)
Lab: NORMAL
MCH RBC QN AUTO: 32.3 PG (ref 25.2–33.5)
MCHC RBC AUTO-ENTMCNC: 32.2 G/DL (ref 28.4–34.8)
MCV RBC AUTO: 100.4 FL (ref 82.6–102.9)
NRBC AUTOMATED: 0 PER 100 WBC
PDW BLD-RTO: 11.7 % (ref 11.8–14.4)
PLATELET # BLD: 199 K/UL (ref 138–453)
PMV BLD AUTO: 10.2 FL (ref 8.1–13.5)
POTASSIUM SERPL-SCNC: 4.4 MMOL/L (ref 3.7–5.3)
RBC # BLD: 2.82 M/UL (ref 3.95–5.11)
SODIUM BLD-SCNC: 140 MMOL/L (ref 135–144)
SPECIMEN DESCRIPTION: NORMAL
STATUS: NORMAL
WBC # BLD: 5.7 K/UL (ref 3.5–11.3)

## 2018-10-21 PROCEDURE — 36415 COLL VENOUS BLD VENIPUNCTURE: CPT

## 2018-10-21 PROCEDURE — 85027 COMPLETE CBC AUTOMATED: CPT

## 2018-10-21 PROCEDURE — 97530 THERAPEUTIC ACTIVITIES: CPT

## 2018-10-21 PROCEDURE — 80048 BASIC METABOLIC PNL TOTAL CA: CPT

## 2018-10-21 PROCEDURE — 6370000000 HC RX 637 (ALT 250 FOR IP): Performed by: INTERNAL MEDICINE

## 2018-10-21 PROCEDURE — 99232 SBSQ HOSP IP/OBS MODERATE 35: CPT | Performed by: INTERNAL MEDICINE

## 2018-10-21 PROCEDURE — 6360000002 HC RX W HCPCS: Performed by: NURSE PRACTITIONER

## 2018-10-21 PROCEDURE — 6370000000 HC RX 637 (ALT 250 FOR IP): Performed by: NURSE PRACTITIONER

## 2018-10-21 PROCEDURE — 6360000002 HC RX W HCPCS: Performed by: INTERNAL MEDICINE

## 2018-10-21 RX ORDER — LEVOFLOXACIN 250 MG/1
250 TABLET ORAL DAILY
Qty: 3 TABLET | Refills: 0 | Status: SHIPPED | OUTPATIENT
Start: 2018-10-21 | End: 2018-10-24

## 2018-10-21 RX ORDER — CYANOCOBALAMIN 1000 UG/ML
1000 INJECTION INTRAMUSCULAR; SUBCUTANEOUS ONCE
Status: COMPLETED | OUTPATIENT
Start: 2018-10-21 | End: 2018-10-21

## 2018-10-21 RX ORDER — FOLIC ACID 1 MG/1
1 TABLET ORAL DAILY
Status: DISCONTINUED | OUTPATIENT
Start: 2018-10-21 | End: 2018-10-21 | Stop reason: HOSPADM

## 2018-10-21 RX ORDER — PREDNISONE 10 MG/1
5 TABLET ORAL DAILY
Qty: 30 TABLET | Refills: 1 | Status: SHIPPED | OUTPATIENT
Start: 2018-10-21 | End: 2019-01-01

## 2018-10-21 RX ORDER — UBIDECARENONE 75 MG
100 CAPSULE ORAL DAILY
Status: DISCONTINUED | OUTPATIENT
Start: 2018-10-22 | End: 2018-10-21 | Stop reason: HOSPADM

## 2018-10-21 RX ORDER — FOLIC ACID 1 MG/1
1 TABLET ORAL DAILY
Qty: 30 TABLET | Refills: 3 | Status: SHIPPED | OUTPATIENT
Start: 2018-10-22

## 2018-10-21 RX ORDER — CIPROFLOXACIN 500 MG/1
500 TABLET, FILM COATED ORAL EVERY 12 HOURS SCHEDULED
Status: DISCONTINUED | OUTPATIENT
Start: 2018-10-21 | End: 2018-10-21 | Stop reason: HOSPADM

## 2018-10-21 RX ORDER — CALCIUM CARBONATE 200(500)MG
1000 TABLET,CHEWABLE ORAL 3 TIMES DAILY PRN
Status: DISCONTINUED | OUTPATIENT
Start: 2018-10-21 | End: 2018-10-21 | Stop reason: HOSPADM

## 2018-10-21 RX ADMIN — ENOXAPARIN SODIUM 40 MG: 40 INJECTION SUBCUTANEOUS at 09:00

## 2018-10-21 RX ADMIN — ANTACID TABLETS 500 MG: 500 TABLET, CHEWABLE ORAL at 03:34

## 2018-10-21 RX ADMIN — HYDROCODONE BITARTRATE AND ACETAMINOPHEN 2 TABLET: 5; 325 TABLET ORAL at 09:57

## 2018-10-21 RX ADMIN — ASPIRIN 325 MG: 325 TABLET, COATED ORAL at 09:00

## 2018-10-21 RX ADMIN — HYDROCODONE BITARTRATE AND ACETAMINOPHEN 2 TABLET: 5; 325 TABLET ORAL at 14:22

## 2018-10-21 RX ADMIN — OXYBUTYNIN CHLORIDE 5 MG: 5 TABLET ORAL at 08:59

## 2018-10-21 RX ADMIN — PANTOPRAZOLE SODIUM 40 MG: 40 TABLET, DELAYED RELEASE ORAL at 06:48

## 2018-10-21 RX ADMIN — CIPROFLOXACIN 400 MG: 2 INJECTION, SOLUTION INTRAVENOUS at 10:31

## 2018-10-21 RX ADMIN — PREDNISONE 5 MG: 5 TABLET ORAL at 08:59

## 2018-10-21 RX ADMIN — FOLIC ACID 1 MG: 1 TABLET ORAL at 10:32

## 2018-10-21 RX ADMIN — GABAPENTIN 100 MG: 100 CAPSULE ORAL at 09:00

## 2018-10-21 RX ADMIN — MELOXICAM 7.5 MG: 7.5 TABLET ORAL at 08:59

## 2018-10-21 RX ADMIN — FAMOTIDINE 20 MG: 20 TABLET, FILM COATED ORAL at 09:00

## 2018-10-21 RX ADMIN — CYANOCOBALAMIN 1000 MCG: 1000 INJECTION, SOLUTION INTRAMUSCULAR at 10:31

## 2018-10-21 RX ADMIN — CLOPIDOGREL 75 MG: 75 TABLET, FILM COATED ORAL at 09:00

## 2018-10-21 RX ADMIN — GEMFIBROZIL 600 MG: 600 TABLET ORAL at 09:00

## 2018-10-21 RX ADMIN — AMLODIPINE BESYLATE 5 MG: 5 TABLET ORAL at 09:00

## 2018-10-21 ASSESSMENT — PAIN SCALES - GENERAL
PAINLEVEL_OUTOF10: 5
PAINLEVEL_OUTOF10: 5
PAINLEVEL_OUTOF10: 0
PAINLEVEL_OUTOF10: 5

## 2018-10-21 NOTE — DISCHARGE SUMMARY
throat and cough.  Denies any dysuria, hematuria, frequency or urgency.  Denies any changes in bowel movements. \"     She is still c/o bilateral leg pain and generalized weakness.  She is c/o a frontal HA.  Apparently, these are chronic issues for her. Her urine culture was sterile here, but she can finish Levaquin 250 mg daily x 3 more days. Physical therapy recommended 24 hr supervision at home since she is refusing a SNF or home care. Family is making arrangements. She is much stronger today,  She was found to have anemia (Hb 9.1), normal iron levels but low Vit B12 and folate. Follow up with PCP with hemoccult and further evaluation. Significant therapeutic interventions: IVF, IV antibiotics    Significant Diagnostic Studies:     Hb 9.1    Radiology:    Xr Chest Portable    Result Date: 10/19/2018  EXAMINATION: SINGLE XRAY VIEW OF THE CHEST 10/19/2018 9:42 pm COMPARISON: None. HISTORY: ORDERING SYSTEM PROVIDED HISTORY: weakness TECHNOLOGIST PROVIDED HISTORY: weakness FINDINGS: 2 vertebroplasties noted. The lungs are without acute focal process. There is no effusion or pneumothorax. The cardiomediastinal silhouette is without acute process. The osseous structures are without acute process. No acute process. Consultations:    Consults:     Final Specialist Recommendations/Findings:   IP CONSULT TO HOSPITALIST      The patient was seen and examined on day of discharge and this discharge summary is in conjunction with any daily progress note from day of discharge.     Discharge plan:     Disposition: Home    Physician Follow Up:     MD NORA Samaniego 116  [address]  [phone]           Requiring Further Evaluation/Follow Up POST HOSPITALIZATION/Incidental Findings: anemia    Diet: regular diet    Activity: As tolerated    Instructions to Patient: Follow up with PCP     Discharge Medications:      Medication List      START taking these medications

## 2018-10-22 LAB
EKG ATRIAL RATE: 75 BPM
EKG P AXIS: 36 DEGREES
EKG P-R INTERVAL: 148 MS
EKG Q-T INTERVAL: 372 MS
EKG QRS DURATION: 90 MS
EKG QTC CALCULATION (BAZETT): 415 MS
EKG R AXIS: -36 DEGREES
EKG T AXIS: 20 DEGREES
EKG VENTRICULAR RATE: 75 BPM

## 2019-01-01 ENCOUNTER — OFFICE VISIT (OUTPATIENT)
Dept: PODIATRY | Age: 84
End: 2019-01-01
Payer: MEDICARE

## 2019-01-01 ENCOUNTER — HOSPITAL ENCOUNTER (OUTPATIENT)
Age: 84
Setting detail: OBSERVATION
Discharge: HOME | End: 2019-10-20
Attending: EMERGENCY MEDICINE | Admitting: SURGERY
Payer: OTHER MISCELLANEOUS

## 2019-01-01 ENCOUNTER — APPOINTMENT (OUTPATIENT)
Dept: CT IMAGING | Age: 84
End: 2019-01-01
Payer: OTHER MISCELLANEOUS

## 2019-01-01 ENCOUNTER — APPOINTMENT (OUTPATIENT)
Dept: GENERAL RADIOLOGY | Age: 84
End: 2019-01-01
Payer: OTHER MISCELLANEOUS

## 2019-01-01 ENCOUNTER — APPOINTMENT (OUTPATIENT)
Dept: MRI IMAGING | Age: 84
End: 2019-01-01
Payer: OTHER MISCELLANEOUS

## 2019-01-01 VITALS — HEIGHT: 57 IN | WEIGHT: 143 LBS | BODY MASS INDEX: 30.85 KG/M2

## 2019-01-01 VITALS
HEIGHT: 57 IN | WEIGHT: 145 LBS | SYSTOLIC BLOOD PRESSURE: 149 MMHG | BODY MASS INDEX: 31.28 KG/M2 | RESPIRATION RATE: 16 BRPM | TEMPERATURE: 97.8 F | DIASTOLIC BLOOD PRESSURE: 68 MMHG | OXYGEN SATURATION: 95 % | HEART RATE: 55 BPM

## 2019-01-01 VITALS — WEIGHT: 143 LBS | BODY MASS INDEX: 30.85 KG/M2 | HEIGHT: 57 IN

## 2019-01-01 DIAGNOSIS — B35.1 ONYCHOMYCOSIS: Primary | ICD-10-CM
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
## 2019-01-01 DIAGNOSIS — M79.675 PAIN IN TOES OF BOTH FEET: ICD-10-CM

## 2019-01-01 DIAGNOSIS — M79.674 PAIN IN TOES OF BOTH FEET: ICD-10-CM

## 2019-01-01 DIAGNOSIS — S39.92XA INJURY OF BACK, INITIAL ENCOUNTER: ICD-10-CM

## 2019-01-01 LAB
ALLEN TEST: ABNORMAL
ANION GAP SERPL CALCULATED.3IONS-SCNC: 12 MMOL/L (ref 9–17)
BLOOD BANK SPECIMEN: ABNORMAL
BUN BLDV-MCNC: 21 MG/DL (ref 8–23)
CARBOXYHEMOGLOBIN: ABNORMAL %
CHLORIDE BLD-SCNC: 112 MMOL/L (ref 98–107)
CO2: 17 MMOL/L (ref 20–31)
CREAT SERPL-MCNC: 1.03 MG/DL (ref 0.5–0.9)
ETHANOL PERCENT: <0.01 %
ETHANOL: <10 MG/DL
FIO2: ABNORMAL
GFR AFRICAN AMERICAN: >60 ML/MIN
GFR NON-AFRICAN AMERICAN: 51 ML/MIN
GFR SERPL CREATININE-BSD FRML MDRD: ABNORMAL ML/MIN/{1.73_M2}
GFR SERPL CREATININE-BSD FRML MDRD: ABNORMAL ML/MIN/{1.73_M2}
GLUCOSE BLD-MCNC: 87 MG/DL (ref 70–99)
HCG QUALITATIVE: NEGATIVE
HCO3 VENOUS: ABNORMAL MMOL/L (ref 24–30)
HCT VFR BLD CALC: 32.9 % (ref 36.3–47.1)
HEMOGLOBIN: 10.7 G/DL (ref 11.9–15.1)
INR BLD: 1
MCH RBC QN AUTO: 32.9 PG (ref 25.2–33.5)
MCHC RBC AUTO-ENTMCNC: 32.5 G/DL (ref 28.4–34.8)
MCV RBC AUTO: 101.2 FL (ref 82.6–102.9)
METHEMOGLOBIN: ABNORMAL %
MODE: ABNORMAL
NEGATIVE BASE EXCESS, VEN: ABNORMAL MMOL/L (ref 0–2)
NOTIFICATION TIME: ABNORMAL
NOTIFICATION: ABNORMAL
NRBC AUTOMATED: 0 PER 100 WBC
O2 DEVICE/FLOW/%: ABNORMAL
O2 SAT, VEN: ABNORMAL %
OXYHEMOGLOBIN: ABNORMAL % (ref 95–98)
PARTIAL THROMBOPLASTIN TIME: 22.8 SEC (ref 20.5–30.5)
PATIENT TEMP: ABNORMAL
PCO2, VEN, TEMP ADJ: ABNORMAL MMHG (ref 39–55)
PCO2, VEN: ABNORMAL (ref 39–55)
PDW BLD-RTO: 12.9 % (ref 11.8–14.4)
PEEP/CPAP: ABNORMAL
PH VENOUS: ABNORMAL (ref 7.32–7.42)
PH, VEN, TEMP ADJ: ABNORMAL (ref 7.32–7.42)
PLATELET # BLD: 221 K/UL (ref 138–453)
PMV BLD AUTO: 9.9 FL (ref 8.1–13.5)
PO2, VEN, TEMP ADJ: ABNORMAL MMHG (ref 30–50)
PO2, VEN: ABNORMAL (ref 30–50)
POSITIVE BASE EXCESS, VEN: ABNORMAL MMOL/L (ref 0–2)
POTASSIUM SERPL-SCNC: 4.2 MMOL/L (ref 3.7–5.3)
PROTHROMBIN TIME: 10.3 SEC (ref 9–12)
PSV: ABNORMAL
PT. POSITION: ABNORMAL
RBC # BLD: 3.25 M/UL (ref 3.95–5.11)
RESPIRATORY RATE: ABNORMAL
SAMPLE SITE: ABNORMAL
SET RATE: ABNORMAL
SODIUM BLD-SCNC: 141 MMOL/L (ref 135–144)
TEXT FOR RESPIRATORY: ABNORMAL
TOTAL HB: ABNORMAL G/DL (ref 12–16)
TOTAL RATE: ABNORMAL
VT: ABNORMAL
WBC # BLD: 8 K/UL (ref 3.5–11.3)

## 2019-01-01 PROCEDURE — 11721 DEBRIDE NAIL 6 OR MORE: CPT | Performed by: PODIATRIST

## 2019-01-01 PROCEDURE — G0378 HOSPITAL OBSERVATION PER HR: HCPCS

## 2019-01-01 PROCEDURE — 6370000000 HC RX 637 (ALT 250 FOR IP): Performed by: STUDENT IN AN ORGANIZED HEALTH CARE EDUCATION/TRAINING PROGRAM

## 2019-01-01 PROCEDURE — 99285 EMERGENCY DEPT VISIT HI MDM: CPT

## 2019-01-01 PROCEDURE — 72195 MRI PELVIS W/O DYE: CPT

## 2019-01-01 PROCEDURE — 85027 COMPLETE CBC AUTOMATED: CPT

## 2019-01-01 PROCEDURE — 72146 MRI CHEST SPINE W/O DYE: CPT

## 2019-01-01 PROCEDURE — G0480 DRUG TEST DEF 1-7 CLASSES: HCPCS

## 2019-01-01 PROCEDURE — 84520 ASSAY OF UREA NITROGEN: CPT

## 2019-01-01 PROCEDURE — 2580000003 HC RX 258: Performed by: STUDENT IN AN ORGANIZED HEALTH CARE EDUCATION/TRAINING PROGRAM

## 2019-01-01 PROCEDURE — 73080 X-RAY EXAM OF ELBOW: CPT

## 2019-01-01 PROCEDURE — 82947 ASSAY GLUCOSE BLOOD QUANT: CPT

## 2019-01-01 PROCEDURE — 73060 X-RAY EXAM OF HUMERUS: CPT

## 2019-01-01 PROCEDURE — 72125 CT NECK SPINE W/O DYE: CPT

## 2019-01-01 PROCEDURE — 85730 THROMBOPLASTIN TIME PARTIAL: CPT

## 2019-01-01 PROCEDURE — 73030 X-RAY EXAM OF SHOULDER: CPT

## 2019-01-01 PROCEDURE — 72131 CT LUMBAR SPINE W/O DYE: CPT

## 2019-01-01 PROCEDURE — 72148 MRI LUMBAR SPINE W/O DYE: CPT

## 2019-01-01 PROCEDURE — 73090 X-RAY EXAM OF FOREARM: CPT

## 2019-01-01 PROCEDURE — 82805 BLOOD GASES W/O2 SATURATION: CPT

## 2019-01-01 PROCEDURE — 84703 CHORIONIC GONADOTROPIN ASSAY: CPT

## 2019-01-01 PROCEDURE — 70450 CT HEAD/BRAIN W/O DYE: CPT

## 2019-01-01 PROCEDURE — 82565 ASSAY OF CREATININE: CPT

## 2019-01-01 PROCEDURE — 72128 CT CHEST SPINE W/O DYE: CPT

## 2019-01-01 PROCEDURE — 99999 PR OFFICE/OUTPT VISIT,PROCEDURE ONLY: CPT | Performed by: PODIATRIST

## 2019-01-01 PROCEDURE — 85610 PROTHROMBIN TIME: CPT

## 2019-01-01 PROCEDURE — 72141 MRI NECK SPINE W/O DYE: CPT

## 2019-01-01 PROCEDURE — 72190 X-RAY EXAM OF PELVIS: CPT

## 2019-01-01 PROCEDURE — 80051 ELECTROLYTE PANEL: CPT

## 2019-01-01 RX ORDER — METHOCARBAMOL 750 MG/1
750 TABLET, FILM COATED ORAL EVERY 8 HOURS SCHEDULED
Status: DISCONTINUED | OUTPATIENT
Start: 2019-01-01 | End: 2019-01-01 | Stop reason: HOSPADM

## 2019-01-01 RX ORDER — GEMFIBROZIL 600 MG/1
600 TABLET, FILM COATED ORAL 2 TIMES DAILY WITH MEALS
Status: DISCONTINUED | OUTPATIENT
Start: 2019-01-01 | End: 2019-01-01 | Stop reason: HOSPADM

## 2019-01-01 RX ORDER — PANTOPRAZOLE SODIUM 40 MG/1
40 TABLET, DELAYED RELEASE ORAL
Status: DISCONTINUED | OUTPATIENT
Start: 2019-01-01 | End: 2019-01-01 | Stop reason: HOSPADM

## 2019-01-01 RX ORDER — ACETAMINOPHEN 325 MG/1
325 TABLET ORAL ONCE
Status: COMPLETED | OUTPATIENT
Start: 2019-01-01 | End: 2019-01-01

## 2019-01-01 RX ORDER — GABAPENTIN 300 MG/1
300 CAPSULE ORAL 3 TIMES DAILY
Status: DISCONTINUED | OUTPATIENT
Start: 2019-01-01 | End: 2019-01-01 | Stop reason: HOSPADM

## 2019-01-01 RX ORDER — ONDANSETRON 2 MG/ML
4 INJECTION INTRAMUSCULAR; INTRAVENOUS EVERY 6 HOURS PRN
Status: DISCONTINUED | OUTPATIENT
Start: 2019-01-01 | End: 2019-01-01 | Stop reason: HOSPADM

## 2019-01-01 RX ORDER — CYCLOBENZAPRINE HCL 10 MG
5 TABLET ORAL ONCE
Status: COMPLETED | OUTPATIENT
Start: 2019-01-01 | End: 2019-01-01

## 2019-01-01 RX ORDER — POLYETHYLENE GLYCOL 3350 17 G/17G
17 POWDER, FOR SOLUTION ORAL DAILY
Status: DISCONTINUED | OUTPATIENT
Start: 2019-01-01 | End: 2019-01-01 | Stop reason: HOSPADM

## 2019-01-01 RX ORDER — ATORVASTATIN CALCIUM 20 MG/1
20 TABLET, FILM COATED ORAL NIGHTLY
Status: DISCONTINUED | OUTPATIENT
Start: 2019-01-01 | End: 2019-01-01 | Stop reason: HOSPADM

## 2019-01-01 RX ORDER — AMLODIPINE BESYLATE 5 MG/1
5 TABLET ORAL DAILY
Status: DISCONTINUED | OUTPATIENT
Start: 2019-01-01 | End: 2019-01-01 | Stop reason: HOSPADM

## 2019-01-01 RX ORDER — SODIUM CHLORIDE 0.9 % (FLUSH) 0.9 %
10 SYRINGE (ML) INJECTION EVERY 12 HOURS SCHEDULED
Status: DISCONTINUED | OUTPATIENT
Start: 2019-01-01 | End: 2019-01-01 | Stop reason: HOSPADM

## 2019-01-01 RX ORDER — HYDROCODONE BITARTRATE AND ACETAMINOPHEN 5; 325 MG/1; MG/1
2 TABLET ORAL ONCE
Status: COMPLETED | OUTPATIENT
Start: 2019-01-01 | End: 2019-01-01

## 2019-01-01 RX ORDER — OXYBUTYNIN CHLORIDE 5 MG/1
5 TABLET ORAL 2 TIMES DAILY
Status: DISCONTINUED | OUTPATIENT
Start: 2019-01-01 | End: 2019-01-01 | Stop reason: HOSPADM

## 2019-01-01 RX ORDER — MELOXICAM 7.5 MG/1
7.5 TABLET ORAL DAILY
Status: DISCONTINUED | OUTPATIENT
Start: 2019-01-01 | End: 2019-01-01 | Stop reason: HOSPADM

## 2019-01-01 RX ORDER — HYDROCODONE BITARTRATE AND ACETAMINOPHEN 5; 325 MG/1; MG/1
1 TABLET ORAL ONCE
Status: DISCONTINUED | OUTPATIENT
Start: 2019-01-01 | End: 2019-01-01

## 2019-01-01 RX ORDER — FAMOTIDINE 20 MG/1
40 TABLET, FILM COATED ORAL DAILY
Status: DISCONTINUED | OUTPATIENT
Start: 2019-01-01 | End: 2019-01-01 | Stop reason: HOSPADM

## 2019-01-01 RX ORDER — GABAPENTIN 300 MG/1
CAPSULE ORAL 3 TIMES DAILY
COMMUNITY
Start: 2019-01-01

## 2019-01-01 RX ORDER — LIDOCAINE 4 G/G
1 PATCH TOPICAL DAILY
Status: DISCONTINUED | OUTPATIENT
Start: 2019-01-01 | End: 2019-01-01 | Stop reason: HOSPADM

## 2019-01-01 RX ORDER — SODIUM CHLORIDE, SODIUM LACTATE, POTASSIUM CHLORIDE, CALCIUM CHLORIDE 600; 310; 30; 20 MG/100ML; MG/100ML; MG/100ML; MG/100ML
INJECTION, SOLUTION INTRAVENOUS CONTINUOUS
Status: DISCONTINUED | OUTPATIENT
Start: 2019-01-01 | End: 2019-01-01

## 2019-01-01 RX ORDER — SODIUM CHLORIDE 0.9 % (FLUSH) 0.9 %
10 SYRINGE (ML) INJECTION PRN
Status: DISCONTINUED | OUTPATIENT
Start: 2019-01-01 | End: 2019-01-01 | Stop reason: HOSPADM

## 2019-01-01 RX ORDER — ACETAMINOPHEN 500 MG
1000 TABLET ORAL EVERY 8 HOURS
Status: DISCONTINUED | OUTPATIENT
Start: 2019-01-01 | End: 2019-01-01 | Stop reason: HOSPADM

## 2019-01-01 RX ORDER — HYDROCODONE BITARTRATE AND ACETAMINOPHEN 5; 325 MG/1; MG/1
1 TABLET ORAL EVERY 6 HOURS PRN
Status: DISCONTINUED | OUTPATIENT
Start: 2019-01-01 | End: 2019-01-01 | Stop reason: HOSPADM

## 2019-01-01 RX ORDER — IMIPRAMINE HCL 25 MG
25 TABLET ORAL DAILY
COMMUNITY
Start: 2019-01-01

## 2019-01-01 RX ADMIN — GABAPENTIN 300 MG: 300 CAPSULE ORAL at 10:36

## 2019-01-01 RX ADMIN — Medication 10 ML: at 00:25

## 2019-01-01 RX ADMIN — OXYBUTYNIN CHLORIDE 5 MG: 5 TABLET ORAL at 00:33

## 2019-01-01 RX ADMIN — METHOCARBAMOL TABLETS 750 MG: 750 TABLET, COATED ORAL at 10:36

## 2019-01-01 RX ADMIN — SODIUM CHLORIDE, POTASSIUM CHLORIDE, SODIUM LACTATE AND CALCIUM CHLORIDE: 600; 310; 30; 20 INJECTION, SOLUTION INTRAVENOUS at 00:28

## 2019-01-01 RX ADMIN — HYDROCODONE BITARTRATE AND ACETAMINOPHEN 1 TABLET: 5; 325 TABLET ORAL at 20:06

## 2019-01-01 RX ADMIN — HYDROCODONE BITARTRATE AND ACETAMINOPHEN 2 TABLET: 5; 325 TABLET ORAL at 13:19

## 2019-01-01 RX ADMIN — ACETAMINOPHEN 325 MG: 325 TABLET ORAL at 13:19

## 2019-01-01 RX ADMIN — ACETAMINOPHEN 1000 MG: 500 TABLET ORAL at 00:31

## 2019-01-01 RX ADMIN — GEMFIBROZIL 600 MG: 600 TABLET ORAL at 10:36

## 2019-01-01 RX ADMIN — GABAPENTIN 300 MG: 300 CAPSULE ORAL at 00:32

## 2019-01-01 RX ADMIN — MELOXICAM 7.5 MG: 7.5 TABLET ORAL at 00:32

## 2019-01-01 RX ADMIN — Medication 10 ML: at 10:40

## 2019-01-01 RX ADMIN — METHOCARBAMOL TABLETS 750 MG: 750 TABLET, COATED ORAL at 00:32

## 2019-01-01 RX ADMIN — MELOXICAM 7.5 MG: 7.5 TABLET ORAL at 10:36

## 2019-01-01 RX ADMIN — ATORVASTATIN CALCIUM 20 MG: 20 TABLET, FILM COATED ORAL at 00:32

## 2019-01-01 RX ADMIN — CYCLOBENZAPRINE 5 MG: 10 TABLET, FILM COATED ORAL at 14:55

## 2019-01-01 RX ADMIN — GEMFIBROZIL 600 MG: 600 TABLET ORAL at 00:33

## 2019-01-01 RX ADMIN — PANTOPRAZOLE SODIUM 40 MG: 40 TABLET, DELAYED RELEASE ORAL at 10:37

## 2019-01-01 RX ADMIN — FAMOTIDINE 40 MG: 20 TABLET, FILM COATED ORAL at 00:32

## 2019-01-01 RX ADMIN — OXYBUTYNIN CHLORIDE 5 MG: 5 TABLET ORAL at 10:36

## 2019-01-01 RX ADMIN — ACETAMINOPHEN 1000 MG: 500 TABLET ORAL at 10:36

## 2019-01-01 RX ADMIN — AMLODIPINE BESYLATE 5 MG: 5 TABLET ORAL at 10:36

## 2019-01-01 ASSESSMENT — ENCOUNTER SYMPTOMS
BACK PAIN: 1
NAUSEA: 0
COLOR CHANGE: 0
COLOR CHANGE: 0
CONSTIPATION: 0
VOMITING: 0
DIARRHEA: 0
CONSTIPATION: 0
NAUSEA: 0
NAUSEA: 0
DIARRHEA: 0
DIARRHEA: 0
COLOR CHANGE: 0
VOMITING: 0
CONSTIPATION: 0
VOMITING: 0
BACK PAIN: 1
BACK PAIN: 1

## 2019-01-01 ASSESSMENT — PAIN DESCRIPTION - ORIENTATION
ORIENTATION: LOWER
ORIENTATION: MID;RIGHT;LEFT

## 2019-01-01 ASSESSMENT — PAIN SCALES - GENERAL
PAINLEVEL_OUTOF10: 0
PAINLEVEL_OUTOF10: 7
PAINLEVEL_OUTOF10: 10
PAINLEVEL_OUTOF10: 7
PAINLEVEL_OUTOF10: 7
PAINLEVEL_OUTOF10: 10
PAINLEVEL_OUTOF10: 10

## 2019-01-01 ASSESSMENT — PAIN DESCRIPTION - DESCRIPTORS
DESCRIPTORS: ACHING
DESCRIPTORS: ACHING
DESCRIPTORS: ACHING;DISCOMFORT

## 2019-01-01 ASSESSMENT — PAIN DESCRIPTION - PAIN TYPE
TYPE: ACUTE PAIN
TYPE: ACUTE PAIN

## 2019-01-01 ASSESSMENT — PAIN DESCRIPTION - LOCATION
LOCATION: BACK

## 2019-01-23 ENCOUNTER — OFFICE VISIT (OUTPATIENT)
Dept: PODIATRY | Age: 84
End: 2019-01-23
Payer: MEDICARE

## 2019-01-23 VITALS — WEIGHT: 143 LBS | BODY MASS INDEX: 28.07 KG/M2 | HEIGHT: 60 IN

## 2019-01-23 DIAGNOSIS — M79.674 PAIN IN TOES OF BOTH FEET: ICD-10-CM

## 2019-01-23 DIAGNOSIS — M79.675 PAIN IN TOES OF BOTH FEET: ICD-10-CM

## 2019-01-23 DIAGNOSIS — B35.1 ONYCHOMYCOSIS: Primary | ICD-10-CM

## 2019-01-23 PROCEDURE — 11721 DEBRIDE NAIL 6 OR MORE: CPT | Performed by: PODIATRIST

## 2019-01-23 PROCEDURE — 99999 PR OFFICE/OUTPT VISIT,PROCEDURE ONLY: CPT | Performed by: PODIATRIST

## 2019-01-23 RX ORDER — ALENDRONATE SODIUM 70 MG/1
TABLET ORAL
COMMUNITY
Start: 2018-11-12 | End: 2019-01-01 | Stop reason: ALTCHOICE

## 2019-01-23 ASSESSMENT — ENCOUNTER SYMPTOMS
COLOR CHANGE: 0
VOMITING: 0
CONSTIPATION: 0
BACK PAIN: 1
DIARRHEA: 0
NAUSEA: 0

## 2019-04-22 NOTE — PROGRESS NOTES
[facility]  Return Patient    Chief Complaint   Patient presents with    Nail Problem     nail trim/last saw Johnny Conteh 4/22/19       Subjective: This C[facility] Reasons comes to clinic for foot and nail care. Pt currently has complaint of thickened, painful, elongated nails that he/she cannot manage by themselves. Pt. Relates pain to nails with shoe gear. Pt's primary care physician is Roberta Antunez MD.  Past Medical History:   Diagnosis Date    Anemia     Arteriosclerosis     Arthritis     Atherosclerotic ulcer of aorta (HCC)     Bradycardia     Compression fracture of body of thoracic vertebra (HCC)     Constipation     Fibromyalgia     GERD (gastroesophageal reflux disease)     Hyperlipidemia     Osteoarthritis     Osteoporosis     Palpitations     Polymyalgia (HCC)        Allergies   Allergen Reactions    Adhesive Tape Other (See Comments)     Tears pt. Skin/blisters    Benadryl [Diphenhydramine]      Pt became confused and combative. IV benadryl, not oral    Shellfish-Derived Products Anaphylaxis     Difficulty breathing    Codeine     Dicyclomine     Hydroxyzine Hcl     Other      Other reaction(s): Unknown    Penicillins     Pregabalin     Sulfa Antibiotics      Current Outpatient Medications on File Prior to Visit   Medication Sig Dispense Refill    IRON PO Take by mouth      CRANBERRY PO Take by mouth      linaclotide (LINZESS) 145 MCG capsule Take 1 capsule by mouth every morning (before breakfast) 30 capsule 1    vitamin B-12 100 MCG tablet Take 1 tablet by mouth daily 30 tablet 3    folic acid (FOLVITE) 1 MG tablet Take 1 tablet by mouth daily 30 tablet 3    amLODIPine (NORVASC) 5 MG tablet Take 5 mg by mouth daily      meloxicam (MOBIC) 7.5 MG tablet Take 7.5 mg by mouth daily      HYDROcodone-acetaminophen (NORCO) 5-325 MG per tablet Take 1 tablet by mouth every 6 hours as needed for Pain. Nile Dark ranitidine (ZANTAC) 150 MG tablet Take 150 mg by mouth      pantoprazole (PROTONIX) 40 MG tablet       gabapentin (NEURONTIN) 100 MG capsule 4 times daily. Daun Skiff aspirin 325 MG tablet Take 325 mg by mouth      atorvastatin (LIPITOR) 20 MG tablet Take 20 mg by mouth      clopidogrel (PLAVIX) 75 MG tablet Take 75 mg by mouth      gemfibrozil (LOPID) 600 MG tablet Take 600 mg by mouth      oxybutynin (DITROPAN) 5 MG tablet Take 5 mg by mouth       No current facility-administered medications on file prior to visit. Review of Systems   Constitutional: Negative for chills, diaphoresis, fatigue, fever and unexpected weight change. Cardiovascular: Negative for leg swelling. Gastrointestinal: Negative for constipation, diarrhea, nausea and vomiting. Musculoskeletal: Positive for arthralgias, back pain, gait problem and joint swelling. Skin: Negative for color change, pallor, rash and wound. Neurological: Negative for weakness and numbness. Objective:  General: AAO x 3 in NAD.     Derm  Toenail Description  Sites of Onychomycosis Involvement (Check affected area)  [x] [x] [x] [x] [x] [x] [x] [x] [x] [x]  5 4 3 2 1 1 2 3 4 5                          Right                                        Left    Thickness  [x] [x] [x] [x] [x] [x] [x] [x] [x] [x]  5 4 3 2 1 1 2 3 4 5                         Right                                        Left    Dystrophic Changes   [x] [x] [x] [x] [x] [x] [x] [x] [x] [x]  5 4 3 2 1 1 2 3 4 5                         Right                                        Left    Color  [x] [x] [x] [x] [x] [x] [x] [x] [x] [x]  5 4 3 2 1 1 2 3 4 5                          Right                                        Left    Incurvation/Ingrowin   [] [] [] [] [] [] [] [] [] []  5 4 3 2 1 1 2 3 4 5                         Right                                        Left    Inflammation/Pain   [x] [x] [x] [x] [x] [x] [x] [x] [x] [x]  5 4 3 2 1 1 2 3 4 5                         Right Left       Nails are painful 1-10 with direct palpation. Vascular:  DP/PT pulses palpable 2/4, Bilateral.    CFT <3 seconds to digits 1-5, Bilateral .   Hair growth absent to level of digits, Bilateral.    Neurological:  Sensation present to light touch to level of digits, Bilateral.    Assessment:  82 y.o. female with:   1. Onychomycosis    2. Pain in toes of both feet       Orders Placed This Encounter   Procedures    WI DEBRIDEMENT OF NAILS, 6 OR MORE         Plan:   Pt was evaluated and examined. Patient was given personalized discharge instructions. Nails 1-10 were debrided in length and thickness sharply with a nail nipper and  without incident. Pt will follow up in 3 months or sooner if any problems arise. Diagnosis was discussed with the pt and all of their questions were answered in detail. Proper foot hygiene and care was discussed with the pt. Patient to check feet daily and contact the office with any questions/problems/concerns. Other comorbidity noted and will be managed by PCP. Pain waiver discussed with patient and confirmed.    4/22/2019    Electronically signed by Julee Santiago DPM on 4/22/2019 at 5:13 PM
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
## 2020-01-01 ENCOUNTER — HOSPITAL ENCOUNTER (INPATIENT)
Age: 85
LOS: 10 days | DRG: 870 | End: 2020-04-18
Attending: EMERGENCY MEDICINE | Admitting: INTERNAL MEDICINE
Payer: MEDICARE

## 2020-01-01 ENCOUNTER — ANESTHESIA (OUTPATIENT)
Dept: SURGICAL ICU | Age: 85
DRG: 870 | End: 2020-01-01
Payer: MEDICARE

## 2020-01-01 ENCOUNTER — APPOINTMENT (OUTPATIENT)
Dept: GENERAL RADIOLOGY | Age: 85
DRG: 870 | End: 2020-01-01
Payer: MEDICARE

## 2020-01-01 ENCOUNTER — ANESTHESIA EVENT (OUTPATIENT)
Dept: SURGICAL ICU | Age: 85
DRG: 870 | End: 2020-01-01
Payer: MEDICARE

## 2020-01-01 ENCOUNTER — OFFICE VISIT (OUTPATIENT)
Dept: PODIATRY | Age: 85
End: 2020-01-01
Payer: MEDICARE

## 2020-01-01 VITALS
RESPIRATION RATE: 30 BRPM | WEIGHT: 149.25 LBS | BODY MASS INDEX: 29.3 KG/M2 | OXYGEN SATURATION: 95 % | TEMPERATURE: 98.1 F | DIASTOLIC BLOOD PRESSURE: 69 MMHG | HEIGHT: 60 IN | HEART RATE: 110 BPM | SYSTOLIC BLOOD PRESSURE: 136 MMHG

## 2020-01-01 VITALS — WEIGHT: 140 LBS | BODY MASS INDEX: 27.48 KG/M2 | HEIGHT: 60 IN

## 2020-01-01 LAB
-: ABNORMAL
ABSOLUTE EOS #: 0 K/UL (ref 0–0.4)
ABSOLUTE IMMATURE GRANULOCYTE: 0 K/UL (ref 0–0.3)
ABSOLUTE IMMATURE GRANULOCYTE: 0.14 K/UL (ref 0–0.3)
ABSOLUTE IMMATURE GRANULOCYTE: 0.15 K/UL (ref 0–0.3)
ABSOLUTE LYMPH #: 0.08 K/UL (ref 1–4.8)
ABSOLUTE LYMPH #: 0.13 K/UL (ref 1–4.8)
ABSOLUTE LYMPH #: 0.17 K/UL (ref 1–4.8)
ABSOLUTE LYMPH #: 0.27 K/UL (ref 1–4.8)
ABSOLUTE LYMPH #: 0.31 K/UL (ref 1–4.8)
ABSOLUTE LYMPH #: 0.39 K/UL (ref 1–4.8)
ABSOLUTE LYMPH #: 0.41 K/UL (ref 1–4.8)
ABSOLUTE LYMPH #: 0.52 K/UL (ref 1–4.8)
ABSOLUTE MONO #: 0.1 K/UL (ref 0.1–0.8)
ABSOLUTE MONO #: 0.1 K/UL (ref 0.1–0.8)
ABSOLUTE MONO #: 0.13 K/UL (ref 0.1–0.8)
ABSOLUTE MONO #: 0.17 K/UL (ref 0.1–0.8)
ABSOLUTE MONO #: 0.31 K/UL (ref 0.1–0.8)
ABSOLUTE MONO #: 0.34 K/UL (ref 0.1–0.8)
ABSOLUTE MONO #: 0.5 K/UL (ref 0.1–0.8)
ABSOLUTE MONO #: 0.81 K/UL (ref 0.1–0.8)
ACTION: NORMAL
ACTION: NORMAL
ADENOVIRUS PCR: NOT DETECTED
ALBUMIN (CALCULATED): 3.1 G/DL (ref 3.2–5.2)
ALBUMIN PERCENT: 52 % (ref 45–65)
ALBUMIN SERPL-MCNC: 1.6 G/DL (ref 3.5–5.2)
ALBUMIN SERPL-MCNC: 1.8 G/DL (ref 3.5–5.2)
ALBUMIN SERPL-MCNC: 1.9 G/DL (ref 3.5–5.2)
ALBUMIN SERPL-MCNC: 2 G/DL (ref 3.5–5.2)
ALBUMIN SERPL-MCNC: 2 G/DL (ref 3.5–5.2)
ALBUMIN SERPL-MCNC: 2.1 G/DL (ref 3.5–5.2)
ALBUMIN SERPL-MCNC: 2.1 G/DL (ref 3.5–5.2)
ALBUMIN SERPL-MCNC: 2.2 G/DL (ref 3.5–5.2)
ALBUMIN SERPL-MCNC: 2.4 G/DL (ref 3.5–5.2)
ALBUMIN SERPL-MCNC: 2.4 G/DL (ref 3.5–5.2)
ALBUMIN SERPL-MCNC: 3.7 G/DL (ref 3.5–5.2)
ALBUMIN/GLOBULIN RATIO: 0.4 (ref 1–2.5)
ALBUMIN/GLOBULIN RATIO: 0.5 (ref 1–2.5)
ALBUMIN/GLOBULIN RATIO: 0.5 (ref 1–2.5)
ALBUMIN/GLOBULIN RATIO: 0.6 (ref 1–2.5)
ALBUMIN/GLOBULIN RATIO: 0.6 (ref 1–2.5)
ALBUMIN/GLOBULIN RATIO: 0.7 (ref 1–2.5)
ALBUMIN/GLOBULIN RATIO: 0.7 (ref 1–2.5)
ALBUMIN/GLOBULIN RATIO: 0.8 (ref 1–2.5)
ALBUMIN/GLOBULIN RATIO: 0.8 (ref 1–2.5)
ALLEN TEST: ABNORMAL
ALP BLD-CCNC: 76 U/L (ref 35–104)
ALP BLD-CCNC: 77 U/L (ref 35–104)
ALP BLD-CCNC: 77 U/L (ref 35–104)
ALP BLD-CCNC: 79 U/L (ref 35–104)
ALP BLD-CCNC: 81 U/L (ref 35–104)
ALP BLD-CCNC: 82 U/L (ref 35–104)
ALP BLD-CCNC: 85 U/L (ref 35–104)
ALP BLD-CCNC: 87 U/L (ref 35–104)
ALP BLD-CCNC: 90 U/L (ref 35–104)
ALP BLD-CCNC: 93 U/L (ref 35–104)
ALP BLD-CCNC: 97 U/L (ref 35–104)
ALPHA 1 PERCENT: 7 % (ref 3–6)
ALPHA 2 PERCENT: 19 % (ref 6–13)
ALPHA-1-GLOBULIN: 0.4 G/DL (ref 0.1–0.4)
ALPHA-2-GLOBULIN: 1.1 G/DL (ref 0.5–0.9)
ALT SERPL-CCNC: 12 U/L (ref 5–33)
ALT SERPL-CCNC: 15 U/L (ref 5–33)
ALT SERPL-CCNC: 16 U/L (ref 5–33)
ALT SERPL-CCNC: 17 U/L (ref 5–33)
ALT SERPL-CCNC: 195 U/L (ref 5–33)
ALT SERPL-CCNC: 376 U/L (ref 5–33)
ALT SERPL-CCNC: 405 U/L (ref 5–33)
ALT SERPL-CCNC: 82 U/L (ref 5–33)
AMMONIA: 31 UMOL/L (ref 11–51)
AMORPHOUS: ABNORMAL
ANION GAP SERPL CALCULATED.3IONS-SCNC: 12 MMOL/L (ref 9–17)
ANION GAP SERPL CALCULATED.3IONS-SCNC: 13 MMOL/L (ref 9–17)
ANION GAP SERPL CALCULATED.3IONS-SCNC: 13 MMOL/L (ref 9–17)
ANION GAP SERPL CALCULATED.3IONS-SCNC: 14 MMOL/L (ref 9–17)
ANION GAP SERPL CALCULATED.3IONS-SCNC: 14 MMOL/L (ref 9–17)
ANION GAP SERPL CALCULATED.3IONS-SCNC: 15 MMOL/L (ref 9–17)
ANION GAP SERPL CALCULATED.3IONS-SCNC: 16 MMOL/L (ref 9–17)
ANION GAP SERPL CALCULATED.3IONS-SCNC: 16 MMOL/L (ref 9–17)
ANION GAP SERPL CALCULATED.3IONS-SCNC: 17 MMOL/L (ref 9–17)
ANION GAP SERPL CALCULATED.3IONS-SCNC: 20 MMOL/L (ref 9–17)
ANION GAP: 11 MMOL/L (ref 7–16)
ANTI-NUCLEAR ANTIBODY (ANA): NEGATIVE
AST SERPL-CCNC: 1029 U/L
AST SERPL-CCNC: 137 U/L
AST SERPL-CCNC: 219 U/L
AST SERPL-CCNC: 50 U/L
AST SERPL-CCNC: 534 U/L
AST SERPL-CCNC: 60 U/L
AST SERPL-CCNC: 68 U/L
AST SERPL-CCNC: 71 U/L
AST SERPL-CCNC: 74 U/L
AST SERPL-CCNC: 78 U/L
AST SERPL-CCNC: 80 U/L
BACTERIA: ABNORMAL
BASOPHILS # BLD: 0 % (ref 0–2)
BASOPHILS ABSOLUTE: 0 K/UL (ref 0–0.2)
BETA GLOBULIN: 0.7 G/DL (ref 0.5–1.1)
BETA PERCENT: 12 % (ref 11–19)
BILIRUB SERPL-MCNC: 0.29 MG/DL (ref 0.3–1.2)
BILIRUB SERPL-MCNC: 0.32 MG/DL (ref 0.3–1.2)
BILIRUB SERPL-MCNC: 0.34 MG/DL (ref 0.3–1.2)
BILIRUB SERPL-MCNC: 0.35 MG/DL (ref 0.3–1.2)
BILIRUB SERPL-MCNC: 0.35 MG/DL (ref 0.3–1.2)
BILIRUB SERPL-MCNC: 0.36 MG/DL (ref 0.3–1.2)
BILIRUB SERPL-MCNC: 0.37 MG/DL (ref 0.3–1.2)
BILIRUB SERPL-MCNC: 0.39 MG/DL (ref 0.3–1.2)
BILIRUB SERPL-MCNC: 0.39 MG/DL (ref 0.3–1.2)
BILIRUB SERPL-MCNC: 0.45 MG/DL (ref 0.3–1.2)
BILIRUB SERPL-MCNC: 0.47 MG/DL (ref 0.3–1.2)
BILIRUBIN DIRECT: 0.16 MG/DL
BILIRUBIN DIRECT: 0.22 MG/DL
BILIRUBIN DIRECT: 0.27 MG/DL
BILIRUBIN DIRECT: 0.33 MG/DL
BILIRUBIN URINE: NEGATIVE
BILIRUBIN, INDIRECT: 0.08 MG/DL (ref 0–1)
BILIRUBIN, INDIRECT: 0.12 MG/DL (ref 0–1)
BILIRUBIN, INDIRECT: 0.14 MG/DL (ref 0–1)
BILIRUBIN, INDIRECT: 0.23 MG/DL (ref 0–1)
BNP INTERPRETATION: ABNORMAL
BORDETELLA PARAPERTUSSIS: NOT DETECTED
BORDETELLA PERTUSSIS PCR: NOT DETECTED
BUN BLDV-MCNC: 38 MG/DL (ref 8–23)
BUN BLDV-MCNC: 42 MG/DL (ref 8–23)
BUN BLDV-MCNC: 43 MG/DL (ref 8–23)
BUN BLDV-MCNC: 44 MG/DL (ref 8–23)
BUN BLDV-MCNC: 55 MG/DL (ref 8–23)
BUN BLDV-MCNC: 62 MG/DL (ref 8–23)
BUN BLDV-MCNC: 66 MG/DL (ref 8–23)
BUN BLDV-MCNC: 68 MG/DL (ref 8–23)
BUN BLDV-MCNC: 69 MG/DL (ref 8–23)
BUN BLDV-MCNC: 71 MG/DL (ref 8–23)
BUN BLDV-MCNC: 75 MG/DL (ref 8–23)
BUN/CREAT BLD: ABNORMAL (ref 9–20)
C-REACTIVE PROTEIN: 356.9 MG/L (ref 0–5)
CALCIUM SERPL-MCNC: 7.4 MG/DL (ref 8.6–10.4)
CALCIUM SERPL-MCNC: 7.5 MG/DL (ref 8.6–10.4)
CALCIUM SERPL-MCNC: 7.6 MG/DL (ref 8.6–10.4)
CALCIUM SERPL-MCNC: 8 MG/DL (ref 8.6–10.4)
CALCIUM SERPL-MCNC: 8.1 MG/DL (ref 8.6–10.4)
CALCIUM SERPL-MCNC: 8.1 MG/DL (ref 8.6–10.4)
CALCIUM SERPL-MCNC: 8.2 MG/DL (ref 8.6–10.4)
CALCIUM SERPL-MCNC: 8.3 MG/DL (ref 8.6–10.4)
CALCIUM SERPL-MCNC: 8.7 MG/DL (ref 8.6–10.4)
CALCIUM SERPL-MCNC: 8.9 MG/DL (ref 8.6–10.4)
CALCIUM SERPL-MCNC: 9 MG/DL (ref 8.6–10.4)
CALCIUM SERPL-MCNC: 9.3 MG/DL (ref 8.6–10.4)
CALCIUM SERPL-MCNC: 9.5 MG/DL (ref 8.6–10.4)
CASTS UA: ABNORMAL /LPF (ref 0–2)
CASTS UA: ABNORMAL /LPF (ref 0–2)
CHLAMYDIA PNEUMONIAE BY PCR: NOT DETECTED
CHLORIDE BLD-SCNC: 100 MMOL/L (ref 98–107)
CHLORIDE BLD-SCNC: 101 MMOL/L (ref 98–107)
CHLORIDE BLD-SCNC: 103 MMOL/L (ref 98–107)
CHLORIDE BLD-SCNC: 105 MMOL/L (ref 98–107)
CHLORIDE BLD-SCNC: 106 MMOL/L (ref 98–107)
CHLORIDE BLD-SCNC: 92 MMOL/L (ref 98–107)
CHLORIDE BLD-SCNC: 94 MMOL/L (ref 98–107)
CHLORIDE BLD-SCNC: 95 MMOL/L (ref 98–107)
CHLORIDE BLD-SCNC: 95 MMOL/L (ref 98–107)
CHLORIDE BLD-SCNC: 96 MMOL/L (ref 98–107)
CHLORIDE BLD-SCNC: 97 MMOL/L (ref 98–107)
CHLORIDE BLD-SCNC: 98 MMOL/L (ref 98–107)
CHLORIDE BLD-SCNC: 99 MMOL/L (ref 98–107)
CO2: 10 MMOL/L (ref 20–31)
CO2: 11 MMOL/L (ref 20–31)
CO2: 18 MMOL/L (ref 20–31)
CO2: 21 MMOL/L (ref 20–31)
CO2: 22 MMOL/L (ref 20–31)
CO2: 24 MMOL/L (ref 20–31)
CO2: 26 MMOL/L (ref 20–31)
CO2: 27 MMOL/L (ref 20–31)
CO2: 28 MMOL/L (ref 20–31)
CO2: 29 MMOL/L (ref 20–31)
CO2: 30 MMOL/L (ref 20–31)
CO2: 31 MMOL/L (ref 20–31)
CO2: 32 MMOL/L (ref 20–31)
COLOR: YELLOW
COMPLEMENT C4: 16 MG/DL (ref 10–40)
CORONAVIRUS 229E PCR: NOT DETECTED
CORONAVIRUS HKU1 PCR: NOT DETECTED
CORONAVIRUS NL63 PCR: NOT DETECTED
CORONAVIRUS OC43 PCR: NOT DETECTED
CREAT SERPL-MCNC: 0.93 MG/DL (ref 0.5–0.9)
CREAT SERPL-MCNC: 1.03 MG/DL (ref 0.5–0.9)
CREAT SERPL-MCNC: 1.04 MG/DL (ref 0.5–0.9)
CREAT SERPL-MCNC: 1.27 MG/DL (ref 0.5–0.9)
CREAT SERPL-MCNC: 1.27 MG/DL (ref 0.5–0.9)
CREAT SERPL-MCNC: 1.51 MG/DL (ref 0.5–0.9)
CREAT SERPL-MCNC: 1.53 MG/DL (ref 0.5–0.9)
CREAT SERPL-MCNC: 1.57 MG/DL (ref 0.5–0.9)
CREAT SERPL-MCNC: 1.7 MG/DL (ref 0.5–0.9)
CREAT SERPL-MCNC: 1.7 MG/DL (ref 0.5–0.9)
CREAT SERPL-MCNC: 1.75 MG/DL (ref 0.5–0.9)
CREAT SERPL-MCNC: 1.75 MG/DL (ref 0.5–0.9)
CREAT SERPL-MCNC: 1.8 MG/DL (ref 0.5–0.9)
CREAT SERPL-MCNC: 1.83 MG/DL (ref 0.5–0.9)
CREAT SERPL-MCNC: 1.83 MG/DL (ref 0.5–0.9)
CRYSTALS, UA: ABNORMAL /HPF
CULTURE: NO GROWTH
CULTURE: NORMAL
DATE AND TIME: NORMAL
DATE AND TIME: NORMAL
DIFFERENTIAL TYPE: ABNORMAL
DIRECT EXAM: NORMAL
EKG ATRIAL RATE: 59 BPM
EKG ATRIAL RATE: 76 BPM
EKG ATRIAL RATE: 79 BPM
EKG ATRIAL RATE: 88 BPM
EKG ATRIAL RATE: 89 BPM
EKG P AXIS: 24 DEGREES
EKG P AXIS: 28 DEGREES
EKG P AXIS: 33 DEGREES
EKG P AXIS: 36 DEGREES
EKG P AXIS: 40 DEGREES
EKG P-R INTERVAL: 148 MS
EKG P-R INTERVAL: 150 MS
EKG P-R INTERVAL: 150 MS
EKG P-R INTERVAL: 152 MS
EKG P-R INTERVAL: 166 MS
EKG Q-T INTERVAL: 350 MS
EKG Q-T INTERVAL: 382 MS
EKG Q-T INTERVAL: 404 MS
EKG Q-T INTERVAL: 416 MS
EKG Q-T INTERVAL: 452 MS
EKG QRS DURATION: 106 MS
EKG QRS DURATION: 108 MS
EKG QRS DURATION: 118 MS
EKG QRS DURATION: 122 MS
EKG QRS DURATION: 130 MS
EKG QTC CALCULATION (BAZETT): 425 MS
EKG QTC CALCULATION (BAZETT): 447 MS
EKG QTC CALCULATION (BAZETT): 462 MS
EKG QTC CALCULATION (BAZETT): 463 MS
EKG QTC CALCULATION (BAZETT): 468 MS
EKG R AXIS: -47 DEGREES
EKG R AXIS: -48 DEGREES
EKG R AXIS: -48 DEGREES
EKG R AXIS: -51 DEGREES
EKG R AXIS: -52 DEGREES
EKG T AXIS: -9 DEGREES
EKG T AXIS: 20 DEGREES
EKG T AXIS: 21 DEGREES
EKG T AXIS: 27 DEGREES
EKG T AXIS: 64 DEGREES
EKG VENTRICULAR RATE: 59 BPM
EKG VENTRICULAR RATE: 76 BPM
EKG VENTRICULAR RATE: 79 BPM
EKG VENTRICULAR RATE: 88 BPM
EKG VENTRICULAR RATE: 89 BPM
EOSINOPHILS RELATIVE PERCENT: 0 % (ref 1–4)
EPITHELIAL CELLS UA: ABNORMAL /HPF (ref 0–5)
FERRITIN: 1484 UG/L (ref 13–150)
FIO2: 100
FIO2: 30
FIO2: 50
FIO2: 55
FIO2: 60
FIO2: 60
FIO2: 65
FIO2: 70
FIO2: 70
FIO2: 80
FIO2: 80
FIO2: ABNORMAL
GAMMA GLOBULIN %: 10 % (ref 9–20)
GAMMA GLOBULIN: 0.6 G/DL (ref 0.5–1.5)
GFR AFRICAN AMERICAN: 32 ML/MIN
GFR AFRICAN AMERICAN: 32 ML/MIN
GFR AFRICAN AMERICAN: 33 ML/MIN
GFR AFRICAN AMERICAN: 34 ML/MIN
GFR AFRICAN AMERICAN: 34 ML/MIN
GFR AFRICAN AMERICAN: 35 ML/MIN
GFR AFRICAN AMERICAN: 35 ML/MIN
GFR AFRICAN AMERICAN: 38 ML/MIN
GFR AFRICAN AMERICAN: 39 ML/MIN
GFR AFRICAN AMERICAN: 40 ML/MIN
GFR AFRICAN AMERICAN: 49 ML/MIN
GFR AFRICAN AMERICAN: 49 ML/MIN
GFR AFRICAN AMERICAN: >60 ML/MIN
GFR NON-AFRICAN AMERICAN: 26 ML/MIN
GFR NON-AFRICAN AMERICAN: 26 ML/MIN
GFR NON-AFRICAN AMERICAN: 27 ML/MIN
GFR NON-AFRICAN AMERICAN: 28 ML/MIN
GFR NON-AFRICAN AMERICAN: 29 ML/MIN
GFR NON-AFRICAN AMERICAN: 29 ML/MIN
GFR NON-AFRICAN AMERICAN: 32 ML/MIN
GFR NON-AFRICAN AMERICAN: 32 ML/MIN
GFR NON-AFRICAN AMERICAN: 33 ML/MIN
GFR NON-AFRICAN AMERICAN: 40 ML/MIN
GFR NON-AFRICAN AMERICAN: 40 ML/MIN
GFR NON-AFRICAN AMERICAN: 51 ML/MIN
GFR NON-AFRICAN AMERICAN: 51 ML/MIN
GFR NON-AFRICAN AMERICAN: 58 ML/MIN
GFR SERPL CREATININE-BSD FRML MDRD: 34 ML/MIN
GFR SERPL CREATININE-BSD FRML MDRD: ABNORMAL ML/MIN/{1.73_M2}
GLOBULIN: ABNORMAL G/DL (ref 1.5–3.8)
GLUCOSE BLD-MCNC: 101 MG/DL (ref 65–105)
GLUCOSE BLD-MCNC: 103 MG/DL (ref 70–99)
GLUCOSE BLD-MCNC: 103 MG/DL (ref 70–99)
GLUCOSE BLD-MCNC: 109 MG/DL (ref 74–100)
GLUCOSE BLD-MCNC: 111 MG/DL (ref 65–105)
GLUCOSE BLD-MCNC: 111 MG/DL (ref 70–99)
GLUCOSE BLD-MCNC: 112 MG/DL (ref 70–99)
GLUCOSE BLD-MCNC: 115 MG/DL (ref 70–99)
GLUCOSE BLD-MCNC: 120 MG/DL (ref 65–105)
GLUCOSE BLD-MCNC: 121 MG/DL (ref 70–99)
GLUCOSE BLD-MCNC: 122 MG/DL (ref 65–105)
GLUCOSE BLD-MCNC: 122 MG/DL (ref 65–105)
GLUCOSE BLD-MCNC: 122 MG/DL (ref 70–99)
GLUCOSE BLD-MCNC: 125 MG/DL (ref 70–99)
GLUCOSE BLD-MCNC: 128 MG/DL (ref 70–99)
GLUCOSE BLD-MCNC: 131 MG/DL (ref 74–100)
GLUCOSE BLD-MCNC: 132 MG/DL (ref 74–100)
GLUCOSE BLD-MCNC: 135 MG/DL (ref 74–100)
GLUCOSE BLD-MCNC: 138 MG/DL (ref 70–99)
GLUCOSE BLD-MCNC: 140 MG/DL (ref 70–99)
GLUCOSE BLD-MCNC: 142 MG/DL (ref 74–100)
GLUCOSE BLD-MCNC: 144 MG/DL (ref 70–99)
GLUCOSE BLD-MCNC: 147 MG/DL (ref 65–105)
GLUCOSE BLD-MCNC: 163 MG/DL (ref 70–99)
GLUCOSE BLD-MCNC: 77 MG/DL (ref 74–100)
GLUCOSE BLD-MCNC: 94 MG/DL (ref 70–99)
GLUCOSE BLD-MCNC: 95 MG/DL (ref 70–99)
GLUCOSE BLD-MCNC: 97 MG/DL (ref 65–105)
GLUCOSE URINE: NEGATIVE
HBV SURFACE AB TITR SER: <3.5 MIU/ML
HCO3 VENOUS: 12.8 MMOL/L (ref 22–29)
HCT VFR BLD CALC: 24.2 % (ref 36.3–47.1)
HCT VFR BLD CALC: 24.6 % (ref 36.3–47.1)
HCT VFR BLD CALC: 25.1 % (ref 36.3–47.1)
HCT VFR BLD CALC: 25.9 % (ref 36.3–47.1)
HCT VFR BLD CALC: 26.3 % (ref 36.3–47.1)
HCT VFR BLD CALC: 26.7 % (ref 36.3–47.1)
HCT VFR BLD CALC: 27.3 % (ref 36.3–47.1)
HCT VFR BLD CALC: 29 % (ref 36.3–47.1)
HCT VFR BLD CALC: 35.5 % (ref 36.3–47.1)
HCT VFR BLD CALC: 36.7 % (ref 36.3–47.1)
HEMOGLOBIN: 11.1 G/DL (ref 11.9–15.1)
HEMOGLOBIN: 12.5 G/DL (ref 11.9–15.1)
HEMOGLOBIN: 7.8 G/DL (ref 11.9–15.1)
HEMOGLOBIN: 8 G/DL (ref 11.9–15.1)
HEMOGLOBIN: 8.1 G/DL (ref 11.9–15.1)
HEMOGLOBIN: 8.4 G/DL (ref 11.9–15.1)
HEMOGLOBIN: 8.6 G/DL (ref 11.9–15.1)
HEMOGLOBIN: 8.8 G/DL (ref 11.9–15.1)
HEMOGLOBIN: 9.2 G/DL (ref 11.9–15.1)
HEMOGLOBIN: 9.2 G/DL (ref 11.9–15.1)
HEPATITIS B CORE TOTAL ANTIBODY: NONREACTIVE
HEPATITIS B SURFACE ANTIGEN: NONREACTIVE
HEPATITIS C ANTIBODY: NONREACTIVE
HUMAN METAPNEUMOVIRUS PCR: NOT DETECTED
IMMATURE GRANULOCYTES: 0 %
IMMATURE GRANULOCYTES: 1 %
IMMATURE GRANULOCYTES: 2 %
INFLUENZA A BY PCR: NOT DETECTED
INFLUENZA A H1 (2009) PCR: NORMAL
INFLUENZA A H1 PCR: NORMAL
INFLUENZA A H3 PCR: NORMAL
INFLUENZA B BY PCR: NOT DETECTED
INR BLD: 1
KETONES, URINE: NEGATIVE
LACTATE DEHYDROGENASE: 636 U/L (ref 135–214)
LACTIC ACID, SEPSIS WHOLE BLOOD: 2.9 MMOL/L (ref 0.5–1.9)
LACTIC ACID, SEPSIS: ABNORMAL MMOL/L (ref 0.5–1.9)
LACTIC ACID, WHOLE BLOOD: 0.7 MMOL/L (ref 0.7–2.1)
LACTIC ACID, WHOLE BLOOD: 1.6 MMOL/L (ref 0.7–2.1)
LACTIC ACID: NORMAL MMOL/L
LEGIONELLA PNEUMOPHILIA AG, URINE: NEGATIVE
LEUKOCYTE ESTERASE, URINE: NEGATIVE
LYMPHOCYTES # BLD: 1 % (ref 24–44)
LYMPHOCYTES # BLD: 2 % (ref 24–44)
LYMPHOCYTES # BLD: 4 % (ref 24–44)
LYMPHOCYTES # BLD: 4 % (ref 24–44)
LYMPHOCYTES # BLD: 5 % (ref 24–44)
LYMPHOCYTES # BLD: 7 % (ref 24–44)
Lab: NORMAL
MAGNESIUM: 1.6 MG/DL (ref 1.6–2.6)
MAGNESIUM: 1.7 MG/DL (ref 1.6–2.6)
MAGNESIUM: 1.9 MG/DL (ref 1.6–2.6)
MAGNESIUM: 2.1 MG/DL (ref 1.6–2.6)
MAGNESIUM: 2.2 MG/DL (ref 1.6–2.6)
MAGNESIUM: 2.2 MG/DL (ref 1.6–2.6)
MCH RBC QN AUTO: 31.1 PG (ref 25.2–33.5)
MCH RBC QN AUTO: 31.2 PG (ref 25.2–33.5)
MCH RBC QN AUTO: 31.4 PG (ref 25.2–33.5)
MCH RBC QN AUTO: 31.5 PG (ref 25.2–33.5)
MCH RBC QN AUTO: 31.5 PG (ref 25.2–33.5)
MCH RBC QN AUTO: 31.6 PG (ref 25.2–33.5)
MCH RBC QN AUTO: 31.8 PG (ref 25.2–33.5)
MCH RBC QN AUTO: 31.8 PG (ref 25.2–33.5)
MCH RBC QN AUTO: 32.4 PG (ref 25.2–33.5)
MCHC RBC AUTO-ENTMCNC: 30.4 G/DL (ref 28.4–34.8)
MCHC RBC AUTO-ENTMCNC: 31.3 G/DL (ref 28.4–34.8)
MCHC RBC AUTO-ENTMCNC: 31.7 G/DL (ref 28.4–34.8)
MCHC RBC AUTO-ENTMCNC: 31.7 G/DL (ref 28.4–34.8)
MCHC RBC AUTO-ENTMCNC: 33 G/DL (ref 28.4–34.8)
MCHC RBC AUTO-ENTMCNC: 33.2 G/DL (ref 28.4–34.8)
MCHC RBC AUTO-ENTMCNC: 33.5 G/DL (ref 28.4–34.8)
MCHC RBC AUTO-ENTMCNC: 33.5 G/DL (ref 28.4–34.8)
MCHC RBC AUTO-ENTMCNC: 34.1 G/DL (ref 28.4–34.8)
MCV RBC AUTO: 101.7 FL (ref 82.6–102.9)
MCV RBC AUTO: 103.1 FL (ref 82.6–102.9)
MCV RBC AUTO: 93.1 FL (ref 82.6–102.9)
MCV RBC AUTO: 94 FL (ref 82.6–102.9)
MCV RBC AUTO: 94.9 FL (ref 82.6–102.9)
MCV RBC AUTO: 95.1 FL (ref 82.6–102.9)
MCV RBC AUTO: 96.4 FL (ref 82.6–102.9)
MCV RBC AUTO: 98 FL (ref 82.6–102.9)
MCV RBC AUTO: 99.7 FL (ref 82.6–102.9)
MODE: ABNORMAL
MONOCYTES # BLD: 1 % (ref 1–7)
MONOCYTES # BLD: 1 % (ref 1–7)
MONOCYTES # BLD: 2 % (ref 1–7)
MONOCYTES # BLD: 3 % (ref 1–7)
MONOCYTES # BLD: 4 % (ref 1–7)
MONOCYTES # BLD: 4 % (ref 1–7)
MONOCYTES # BLD: 6 % (ref 1–7)
MONOCYTES # BLD: 6 % (ref 1–7)
MORPHOLOGY: ABNORMAL
MORPHOLOGY: NORMAL
MRSA, DNA, NASAL: NORMAL
MUCUS: ABNORMAL
MYCOPLASMA PNEUMONIAE IGM: 0.27
MYCOPLASMA PNEUMONIAE PCR: NOT DETECTED
NEGATIVE BASE EXCESS, ART: 14 (ref 0–2)
NEGATIVE BASE EXCESS, ART: 15 (ref 0–2)
NEGATIVE BASE EXCESS, ART: 17 (ref 0–2)
NEGATIVE BASE EXCESS, ART: 6 (ref 0–2)
NEGATIVE BASE EXCESS, ART: ABNORMAL (ref 0–2)
NEGATIVE BASE EXCESS, VEN: 11 (ref 0–2)
NITRITE, URINE: NEGATIVE
NOTIFY: NORMAL
NOTIFY: NORMAL
NRBC AUTOMATED: 0 PER 100 WBC
NRBC AUTOMATED: 0.2 PER 100 WBC
NRBC AUTOMATED: 0.5 PER 100 WBC
NUCLEATED RED BLOOD CELLS: 1 PER 100 WBC
O2 DEVICE/FLOW/%: ABNORMAL
O2 SAT, VEN: 79 % (ref 60–85)
OTHER OBSERVATIONS UA: ABNORMAL
PARAINFLUENZA 1 PCR: NOT DETECTED
PARAINFLUENZA 2 PCR: NOT DETECTED
PARAINFLUENZA 3 PCR: NOT DETECTED
PARAINFLUENZA 4 PCR: NOT DETECTED
PARTIAL THROMBOPLASTIN TIME: 24.7 SEC (ref 20.5–30.5)
PARTIAL THROMBOPLASTIN TIME: 27.2 SEC (ref 20.5–30.5)
PARTIAL THROMBOPLASTIN TIME: 39.9 SEC (ref 20.5–30.5)
PARTIAL THROMBOPLASTIN TIME: 40.7 SEC (ref 20.5–30.5)
PARTIAL THROMBOPLASTIN TIME: 43.5 SEC (ref 20.5–30.5)
PARTIAL THROMBOPLASTIN TIME: 43.5 SEC (ref 20.5–30.5)
PARTIAL THROMBOPLASTIN TIME: 49.9 SEC (ref 20.5–30.5)
PARTIAL THROMBOPLASTIN TIME: 52.3 SEC (ref 20.5–30.5)
PARTIAL THROMBOPLASTIN TIME: 77.6 SEC (ref 20.5–30.5)
PATHOLOGIST: ABNORMAL
PATHOLOGIST: NORMAL
PATIENT TEMP: 35.6
PATIENT TEMP: 36.1
PATIENT TEMP: 36.9
PATIENT TEMP: 36.9
PATIENT TEMP: 37.1
PATIENT TEMP: 37.3
PATIENT TEMP: 37.4
PATIENT TEMP: 37.7
PATIENT TEMP: ABNORMAL
PCO2, VEN: 22.6 MM HG (ref 41–51)
PDW BLD-RTO: 13.2 % (ref 11.8–14.4)
PDW BLD-RTO: 13.3 % (ref 11.8–14.4)
PDW BLD-RTO: 13.4 % (ref 11.8–14.4)
PDW BLD-RTO: 13.5 % (ref 11.8–14.4)
PDW BLD-RTO: 13.6 % (ref 11.8–14.4)
PDW BLD-RTO: 13.6 % (ref 11.8–14.4)
PDW BLD-RTO: 13.9 % (ref 11.8–14.4)
PDW BLD-RTO: 13.9 % (ref 11.8–14.4)
PDW BLD-RTO: 14 % (ref 11.8–14.4)
PH UA: 5.5 (ref 5–8)
PH VENOUS: 7.36 (ref 7.32–7.43)
PLATELET # BLD: 131 K/UL (ref 138–453)
PLATELET # BLD: 136 K/UL (ref 138–453)
PLATELET # BLD: 137 K/UL (ref 138–453)
PLATELET # BLD: 139 K/UL (ref 138–453)
PLATELET # BLD: 143 K/UL (ref 138–453)
PLATELET # BLD: 177 K/UL (ref 138–453)
PLATELET # BLD: 178 K/UL (ref 138–453)
PLATELET # BLD: ABNORMAL K/UL (ref 138–453)
PLATELET # BLD: ABNORMAL K/UL (ref 138–453)
PLATELET ESTIMATE: ABNORMAL
PLATELET, FLUORESCENCE: 131 K/UL (ref 138–453)
PLATELET, FLUORESCENCE: 53 K/UL (ref 138–453)
PLATELET, IMMATURE FRACTION: 11.8 % (ref 1.1–10.3)
PLATELET, IMMATURE FRACTION: 7.1 % (ref 1.1–10.3)
PMV BLD AUTO: 10.5 FL (ref 8.1–13.5)
PMV BLD AUTO: 10.6 FL (ref 8.1–13.5)
PMV BLD AUTO: 10.7 FL (ref 8.1–13.5)
PMV BLD AUTO: 10.8 FL (ref 8.1–13.5)
PMV BLD AUTO: 10.8 FL (ref 8.1–13.5)
PMV BLD AUTO: 11.9 FL (ref 8.1–13.5)
PMV BLD AUTO: 12 FL (ref 8.1–13.5)
PMV BLD AUTO: ABNORMAL FL (ref 8.1–13.5)
PMV BLD AUTO: ABNORMAL FL (ref 8.1–13.5)
PO2, VEN: 44 MM HG (ref 30–50)
POC CHLORIDE: 113 MMOL/L (ref 98–107)
POC CREATININE: 1.74 MG/DL (ref 0.51–1.19)
POC HCO3: 11.9 MMOL/L (ref 21–28)
POC HCO3: 13.2 MMOL/L (ref 21–28)
POC HCO3: 13.8 MMOL/L (ref 21–28)
POC HCO3: 19.3 MMOL/L (ref 21–28)
POC HCO3: 26.9 MMOL/L (ref 21–28)
POC HCO3: 28 MMOL/L (ref 21–28)
POC HCO3: 30.3 MMOL/L (ref 21–28)
POC HCO3: 30.9 MMOL/L (ref 21–28)
POC HCO3: 31.2 MMOL/L (ref 21–28)
POC HCO3: 31.7 MMOL/L (ref 21–28)
POC HCO3: 33.2 MMOL/L (ref 21–28)
POC HCO3: 34.4 MMOL/L (ref 21–28)
POC HCO3: 34.6 MMOL/L (ref 21–28)
POC HCO3: 35.7 MMOL/L (ref 21–28)
POC HCO3: 38 MMOL/L (ref 21–28)
POC HEMATOCRIT: 33 % (ref 36–46)
POC HEMOGLOBIN: 11.3 G/DL (ref 12–16)
POC IONIZED CALCIUM: 1.18 MMOL/L (ref 1.15–1.33)
POC LACTIC ACID: 0.52 MMOL/L (ref 0.56–1.39)
POC LACTIC ACID: 0.57 MMOL/L (ref 0.56–1.39)
POC LACTIC ACID: 0.8 MMOL/L (ref 0.56–1.39)
POC O2 SATURATION: 100 % (ref 94–98)
POC O2 SATURATION: 86 % (ref 94–98)
POC O2 SATURATION: 87 % (ref 94–98)
POC O2 SATURATION: 93 % (ref 94–98)
POC O2 SATURATION: 94 % (ref 94–98)
POC O2 SATURATION: 95 % (ref 94–98)
POC O2 SATURATION: 96 % (ref 94–98)
POC O2 SATURATION: 97 % (ref 94–98)
POC O2 SATURATION: 98 % (ref 94–98)
POC O2 SATURATION: 98 % (ref 94–98)
POC O2 SATURATION: 99 % (ref 94–98)
POC PCO2 TEMP: 35 MM HG
POC PCO2 TEMP: 38 MM HG
POC PCO2 TEMP: 39 MM HG
POC PCO2 TEMP: ABNORMAL MM HG
POC PCO2: 30 MM HG (ref 35–48)
POC PCO2: 34.2 MM HG (ref 35–48)
POC PCO2: 34.5 MM HG (ref 35–48)
POC PCO2: 35.4 MM HG (ref 35–48)
POC PCO2: 36 MM HG (ref 35–48)
POC PCO2: 37 MM HG (ref 35–48)
POC PCO2: 37.6 MM HG (ref 35–48)
POC PCO2: 37.9 MM HG (ref 35–48)
POC PCO2: 42.8 MM HG (ref 35–48)
POC PCO2: 45.5 MM HG (ref 35–48)
POC PCO2: 51.8 MM HG (ref 35–48)
POC PCO2: 52.1 MM HG (ref 35–48)
POC PCO2: 53.6 MM HG (ref 35–48)
POC PCO2: 67.8 MM HG (ref 35–48)
POC PCO2: 74.1 MM HG (ref 35–48)
POC PH TEMP: 7.5
POC PH TEMP: 7.51
POC PH TEMP: 7.52
POC PH TEMP: ABNORMAL
POC PH: 7.03 (ref 7.35–7.45)
POC PH: 7.18 (ref 7.35–7.45)
POC PH: 7.21 (ref 7.35–7.45)
POC PH: 7.29 (ref 7.35–7.45)
POC PH: 7.3 (ref 7.35–7.45)
POC PH: 7.34 (ref 7.35–7.45)
POC PH: 7.36 (ref 7.35–7.45)
POC PH: 7.42 (ref 7.35–7.45)
POC PH: 7.5 (ref 7.35–7.45)
POC PH: 7.51 (ref 7.35–7.45)
POC PH: 7.52 (ref 7.35–7.45)
POC PH: 7.53 (ref 7.35–7.45)
POC PH: 7.54 (ref 7.35–7.45)
POC PO2 TEMP: 68 MM HG
POC PO2 TEMP: 82 MM HG
POC PO2 TEMP: 95 MM HG
POC PO2 TEMP: ABNORMAL MM HG
POC PO2: 101.1 MM HG (ref 83–108)
POC PO2: 138.4 MM HG (ref 83–108)
POC PO2: 147.8 MM HG (ref 83–108)
POC PO2: 280.8 MM HG (ref 83–108)
POC PO2: 46.2 MM HG (ref 83–108)
POC PO2: 60.4 MM HG (ref 83–108)
POC PO2: 61.6 MM HG (ref 83–108)
POC PO2: 64.4 MM HG (ref 83–108)
POC PO2: 68.6 MM HG (ref 83–108)
POC PO2: 75.9 MM HG (ref 83–108)
POC PO2: 80.4 MM HG (ref 83–108)
POC PO2: 84.3 MM HG (ref 83–108)
POC PO2: 85.5 MM HG (ref 83–108)
POC PO2: 85.7 MM HG (ref 83–108)
POC PO2: 92.5 MM HG (ref 83–108)
POC POTASSIUM: 4.4 MMOL/L (ref 3.5–4.5)
POC SODIUM: 137 MMOL/L (ref 138–146)
POSITIVE BASE EXCESS, ART: 1 (ref 0–3)
POSITIVE BASE EXCESS, ART: 10 (ref 0–3)
POSITIVE BASE EXCESS, ART: 14 (ref 0–3)
POSITIVE BASE EXCESS, ART: 4 (ref 0–3)
POSITIVE BASE EXCESS, ART: 6 (ref 0–3)
POSITIVE BASE EXCESS, ART: 7 (ref 0–3)
POSITIVE BASE EXCESS, ART: 8 (ref 0–3)
POSITIVE BASE EXCESS, ART: 9 (ref 0–3)
POSITIVE BASE EXCESS, ART: ABNORMAL (ref 0–3)
POSITIVE BASE EXCESS, VEN: ABNORMAL (ref 0–3)
POTASSIUM SERPL-SCNC: 2.7 MMOL/L (ref 3.7–5.3)
POTASSIUM SERPL-SCNC: 2.9 MMOL/L (ref 3.7–5.3)
POTASSIUM SERPL-SCNC: 3 MMOL/L (ref 3.7–5.3)
POTASSIUM SERPL-SCNC: 3.2 MMOL/L (ref 3.7–5.3)
POTASSIUM SERPL-SCNC: 3.3 MMOL/L (ref 3.7–5.3)
POTASSIUM SERPL-SCNC: 3.3 MMOL/L (ref 3.7–5.3)
POTASSIUM SERPL-SCNC: 3.5 MMOL/L (ref 3.7–5.3)
POTASSIUM SERPL-SCNC: 3.5 MMOL/L (ref 3.7–5.3)
POTASSIUM SERPL-SCNC: 3.9 MMOL/L (ref 3.7–5.3)
POTASSIUM SERPL-SCNC: 4 MMOL/L (ref 3.7–5.3)
POTASSIUM SERPL-SCNC: 4.2 MMOL/L (ref 3.7–5.3)
POTASSIUM SERPL-SCNC: 4.8 MMOL/L (ref 3.7–5.3)
POTASSIUM SERPL-SCNC: 5 MMOL/L (ref 3.7–5.3)
POTASSIUM SERPL-SCNC: 5.1 MMOL/L (ref 3.7–5.3)
POTASSIUM SERPL-SCNC: 5.2 MMOL/L (ref 3.7–5.3)
POTASSIUM SERPL-SCNC: 5.4 MMOL/L (ref 3.7–5.3)
POTASSIUM SERPL-SCNC: 5.5 MMOL/L (ref 3.7–5.3)
POTASSIUM SERPL-SCNC: 5.7 MMOL/L (ref 3.7–5.3)
POTASSIUM SERPL-SCNC: 5.8 MMOL/L (ref 3.7–5.3)
POTASSIUM SERPL-SCNC: 6.2 MMOL/L (ref 3.7–5.3)
POTASSIUM SERPL-SCNC: 6.3 MMOL/L (ref 3.7–5.3)
PRO-BNP: 953 PG/ML
PROCALCITONIN: 1.09 NG/ML
PROTEIN ELECTROPHORESIS, SERUM: ABNORMAL
PROTEIN UA: ABNORMAL
PROTHROMBIN TIME: 10.3 SEC (ref 9–12)
RBC # BLD: 2.51 M/UL (ref 3.95–5.11)
RBC # BLD: 2.55 M/UL (ref 3.95–5.11)
RBC # BLD: 2.6 M/UL (ref 3.95–5.11)
RBC # BLD: 2.67 M/UL (ref 3.95–5.11)
RBC # BLD: 2.73 M/UL (ref 3.95–5.11)
RBC # BLD: 2.77 M/UL (ref 3.95–5.11)
RBC # BLD: 2.91 M/UL (ref 3.95–5.11)
RBC # BLD: 3.49 M/UL (ref 3.95–5.11)
RBC # BLD: 3.86 M/UL (ref 3.95–5.11)
RBC # BLD: ABNORMAL 10*6/UL
RBC UA: ABNORMAL /HPF (ref 0–2)
READ BACK: YES
READ BACK: YES
RENAL EPITHELIAL, UA: ABNORMAL /HPF
RESP SYNCYTIAL VIRUS PCR: NOT DETECTED
RHINO/ENTEROVIRUS PCR: NOT DETECTED
SAMPLE SITE: ABNORMAL
SARS-COV-2, NAA: ABNORMAL
SARS-COV-2, PCR: ABNORMAL
SARS-COV-2: DETECTED
SEG NEUTROPHILS: 90 % (ref 36–66)
SEG NEUTROPHILS: 90 % (ref 36–66)
SEG NEUTROPHILS: 91 % (ref 36–66)
SEG NEUTROPHILS: 92 % (ref 36–66)
SEG NEUTROPHILS: 94 % (ref 36–66)
SEG NEUTROPHILS: 95 % (ref 36–66)
SEG NEUTROPHILS: 95 % (ref 36–66)
SEG NEUTROPHILS: 96 % (ref 36–66)
SEGMENTED NEUTROPHILS ABSOLUTE COUNT: 12.28 K/UL (ref 1.8–7.7)
SEGMENTED NEUTROPHILS ABSOLUTE COUNT: 4.94 K/UL (ref 1.8–7.7)
SEGMENTED NEUTROPHILS ABSOLUTE COUNT: 6.44 K/UL (ref 1.8–7.7)
SEGMENTED NEUTROPHILS ABSOLUTE COUNT: 6.93 K/UL (ref 1.8–7.7)
SEGMENTED NEUTROPHILS ABSOLUTE COUNT: 7.63 K/UL (ref 1.8–7.7)
SEGMENTED NEUTROPHILS ABSOLUTE COUNT: 7.98 K/UL (ref 1.8–7.7)
SEGMENTED NEUTROPHILS ABSOLUTE COUNT: 9.68 K/UL (ref 1.8–7.7)
SEGMENTED NEUTROPHILS ABSOLUTE COUNT: 9.69 K/UL (ref 1.8–7.7)
SERUM IFX INTERP: NORMAL
SODIUM BLD-SCNC: 132 MMOL/L (ref 135–144)
SODIUM BLD-SCNC: 133 MMOL/L (ref 135–144)
SODIUM BLD-SCNC: 136 MMOL/L (ref 135–144)
SODIUM BLD-SCNC: 137 MMOL/L (ref 135–144)
SODIUM BLD-SCNC: 137 MMOL/L (ref 135–144)
SODIUM BLD-SCNC: 138 MMOL/L (ref 135–144)
SODIUM BLD-SCNC: 138 MMOL/L (ref 135–144)
SODIUM BLD-SCNC: 139 MMOL/L (ref 135–144)
SODIUM BLD-SCNC: 139 MMOL/L (ref 135–144)
SODIUM BLD-SCNC: 145 MMOL/L (ref 135–144)
SODIUM BLD-SCNC: 147 MMOL/L (ref 135–144)
SOURCE: ABNORMAL
SOURCE: NORMAL
SPECIFIC GRAVITY UA: 1.02 (ref 1–1.03)
SPECIMEN DESCRIPTION: NORMAL
STREP PNEUMONIAE ANTIGEN: NEGATIVE
TCO2 (CALC), ART: 13 MMOL/L (ref 22–29)
TCO2 (CALC), ART: 14 MMOL/L (ref 22–29)
TCO2 (CALC), ART: 15 MMOL/L (ref 22–29)
TCO2 (CALC), ART: 20 MMOL/L (ref 22–29)
TCO2 (CALC), ART: 28 MMOL/L (ref 22–29)
TCO2 (CALC), ART: 30 MMOL/L (ref 22–29)
TCO2 (CALC), ART: 31 MMOL/L (ref 22–29)
TCO2 (CALC), ART: 32 MMOL/L (ref 22–29)
TCO2 (CALC), ART: 32 MMOL/L (ref 22–29)
TCO2 (CALC), ART: 33 MMOL/L (ref 22–29)
TCO2 (CALC), ART: 35 MMOL/L (ref 22–29)
TCO2 (CALC), ART: 36 MMOL/L (ref 22–29)
TCO2 (CALC), ART: 36 MMOL/L (ref 22–29)
TCO2 (CALC), ART: 38 MMOL/L (ref 22–29)
TCO2 (CALC), ART: 39 MMOL/L (ref 22–29)
TOTAL CO2, VENOUS: 14 MMOL/L (ref 23–30)
TOTAL PROT. SUM,%: 100 % (ref 98–102)
TOTAL PROT. SUM: 5.9 G/DL (ref 6.3–8.2)
TOTAL PROTEIN: 5.4 G/DL (ref 6.4–8.3)
TOTAL PROTEIN: 5.5 G/DL (ref 6.4–8.3)
TOTAL PROTEIN: 5.6 G/DL (ref 6.4–8.3)
TOTAL PROTEIN: 5.7 G/DL (ref 6.4–8.3)
TOTAL PROTEIN: 5.8 G/DL (ref 6.4–8.3)
TOTAL PROTEIN: 5.8 G/DL (ref 6.4–8.3)
TOTAL PROTEIN: 5.9 G/DL (ref 6.4–8.3)
TOTAL PROTEIN: 5.9 G/DL (ref 6.4–8.3)
TOTAL PROTEIN: 6 G/DL (ref 6.4–8.3)
TOTAL PROTEIN: 6.2 G/DL (ref 6.4–8.3)
TOTAL PROTEIN: 6.7 G/DL (ref 6.4–8.3)
TOTAL PROTEIN: 8.2 G/DL (ref 6.4–8.3)
TRICHOMONAS: ABNORMAL
TROPONIN INTERP: ABNORMAL
TROPONIN T: ABNORMAL NG/ML
TROPONIN, HIGH SENSITIVITY: 156 NG/L (ref 0–14)
TROPONIN, HIGH SENSITIVITY: 219 NG/L (ref 0–14)
TROPONIN, HIGH SENSITIVITY: 252 NG/L (ref 0–14)
TROPONIN, HIGH SENSITIVITY: 41 NG/L (ref 0–14)
TROPONIN, HIGH SENSITIVITY: 43 NG/L (ref 0–14)
TROPONIN, HIGH SENSITIVITY: 52 NG/L (ref 0–14)
TROPONIN, HIGH SENSITIVITY: 91 NG/L (ref 0–14)
TURBIDITY: CLEAR
URINE HGB: ABNORMAL
UROBILINOGEN, URINE: NORMAL
WBC # BLD: 10.2 K/UL (ref 3.5–11.3)
WBC # BLD: 10.3 K/UL (ref 3.5–11.3)
WBC # BLD: 13.5 K/UL (ref 3.5–11.3)
WBC # BLD: 5.2 K/UL (ref 3.5–11.3)
WBC # BLD: 5.5 K/UL (ref 3.5–11.3)
WBC # BLD: 6.7 K/UL (ref 3.5–11.3)
WBC # BLD: 7.7 K/UL (ref 3.5–11.3)
WBC # BLD: 8.3 K/UL (ref 3.5–11.3)
WBC # BLD: 8.4 K/UL (ref 3.5–11.3)
WBC # BLD: ABNORMAL 10*3/UL
WBC UA: ABNORMAL /HPF (ref 0–5)
YEAST: ABNORMAL

## 2020-01-01 PROCEDURE — 0BH18EZ INSERTION OF ENDOTRACHEAL AIRWAY INTO TRACHEA, VIA NATURAL OR ARTIFICIAL OPENING ENDOSCOPIC: ICD-10-PCS | Performed by: INTERNAL MEDICINE

## 2020-01-01 PROCEDURE — 6360000002 HC RX W HCPCS: Performed by: STUDENT IN AN ORGANIZED HEALTH CARE EDUCATION/TRAINING PROGRAM

## 2020-01-01 PROCEDURE — 37799 UNLISTED PX VASCULAR SURGERY: CPT

## 2020-01-01 PROCEDURE — 83605 ASSAY OF LACTIC ACID: CPT

## 2020-01-01 PROCEDURE — 82803 BLOOD GASES ANY COMBINATION: CPT

## 2020-01-01 PROCEDURE — 2500000003 HC RX 250 WO HCPCS: Performed by: STUDENT IN AN ORGANIZED HEALTH CARE EDUCATION/TRAINING PROGRAM

## 2020-01-01 PROCEDURE — 86334 IMMUNOFIX E-PHORESIS SERUM: CPT

## 2020-01-01 PROCEDURE — 6360000002 HC RX W HCPCS: Performed by: NURSE PRACTITIONER

## 2020-01-01 PROCEDURE — 6370000000 HC RX 637 (ALT 250 FOR IP): Performed by: STUDENT IN AN ORGANIZED HEALTH CARE EDUCATION/TRAINING PROGRAM

## 2020-01-01 PROCEDURE — 87205 SMEAR GRAM STAIN: CPT

## 2020-01-01 PROCEDURE — 85027 COMPLETE CBC AUTOMATED: CPT

## 2020-01-01 PROCEDURE — 94770 HC ETCO2 MONITOR DAILY: CPT

## 2020-01-01 PROCEDURE — 93010 ELECTROCARDIOGRAM REPORT: CPT | Performed by: INTERNAL MEDICINE

## 2020-01-01 PROCEDURE — 6360000002 HC RX W HCPCS: Performed by: INTERNAL MEDICINE

## 2020-01-01 PROCEDURE — 99222 1ST HOSP IP/OBS MODERATE 55: CPT | Performed by: INTERNAL MEDICINE

## 2020-01-01 PROCEDURE — 86038 ANTINUCLEAR ANTIBODIES: CPT

## 2020-01-01 PROCEDURE — 2000000000 HC ICU R&B

## 2020-01-01 PROCEDURE — 93005 ELECTROCARDIOGRAM TRACING: CPT | Performed by: NURSE PRACTITIONER

## 2020-01-01 PROCEDURE — 83880 ASSAY OF NATRIURETIC PEPTIDE: CPT

## 2020-01-01 PROCEDURE — 94761 N-INVAS EAR/PLS OXIMETRY MLT: CPT

## 2020-01-01 PROCEDURE — 5A1955Z RESPIRATORY VENTILATION, GREATER THAN 96 CONSECUTIVE HOURS: ICD-10-PCS | Performed by: INTERNAL MEDICINE

## 2020-01-01 PROCEDURE — 2580000003 HC RX 258: Performed by: STUDENT IN AN ORGANIZED HEALTH CARE EDUCATION/TRAINING PROGRAM

## 2020-01-01 PROCEDURE — 84132 ASSAY OF SERUM POTASSIUM: CPT

## 2020-01-01 PROCEDURE — 90935 HEMODIALYSIS ONE EVALUATION: CPT

## 2020-01-01 PROCEDURE — 86317 IMMUNOASSAY INFECTIOUS AGENT: CPT

## 2020-01-01 PROCEDURE — 86160 COMPLEMENT ANTIGEN: CPT

## 2020-01-01 PROCEDURE — 82565 ASSAY OF CREATININE: CPT

## 2020-01-01 PROCEDURE — 83615 LACTATE (LD) (LDH) ENZYME: CPT

## 2020-01-01 PROCEDURE — 80053 COMPREHEN METABOLIC PANEL: CPT

## 2020-01-01 PROCEDURE — 06HM33Z INSERTION OF INFUSION DEVICE INTO RIGHT FEMORAL VEIN, PERCUTANEOUS APPROACH: ICD-10-PCS | Performed by: SURGERY

## 2020-01-01 PROCEDURE — 2500000003 HC RX 250 WO HCPCS

## 2020-01-01 PROCEDURE — 99291 CRITICAL CARE FIRST HOUR: CPT | Performed by: INTERNAL MEDICINE

## 2020-01-01 PROCEDURE — 85014 HEMATOCRIT: CPT

## 2020-01-01 PROCEDURE — 94003 VENT MGMT INPAT SUBQ DAY: CPT

## 2020-01-01 PROCEDURE — 87070 CULTURE OTHR SPECIMN AEROBIC: CPT

## 2020-01-01 PROCEDURE — 85610 PROTHROMBIN TIME: CPT

## 2020-01-01 PROCEDURE — 93005 ELECTROCARDIOGRAM TRACING: CPT | Performed by: STUDENT IN AN ORGANIZED HEALTH CARE EDUCATION/TRAINING PROGRAM

## 2020-01-01 PROCEDURE — 11721 DEBRIDE NAIL 6 OR MORE: CPT | Performed by: PODIATRIST

## 2020-01-01 PROCEDURE — 5A1D70Z PERFORMANCE OF URINARY FILTRATION, INTERMITTENT, LESS THAN 6 HOURS PER DAY: ICD-10-PCS | Performed by: INTERNAL MEDICINE

## 2020-01-01 PROCEDURE — 84295 ASSAY OF SERUM SODIUM: CPT

## 2020-01-01 PROCEDURE — 83735 ASSAY OF MAGNESIUM: CPT

## 2020-01-01 PROCEDURE — 71045 X-RAY EXAM CHEST 1 VIEW: CPT

## 2020-01-01 PROCEDURE — 86803 HEPATITIS C AB TEST: CPT

## 2020-01-01 PROCEDURE — 80048 BASIC METABOLIC PNL TOTAL CA: CPT

## 2020-01-01 PROCEDURE — 85055 RETICULATED PLATELET ASSAY: CPT

## 2020-01-01 PROCEDURE — 2700000000 HC OXYGEN THERAPY PER DAY

## 2020-01-01 PROCEDURE — 85730 THROMBOPLASTIN TIME PARTIAL: CPT

## 2020-01-01 PROCEDURE — 6370000000 HC RX 637 (ALT 250 FOR IP): Performed by: INTERNAL MEDICINE

## 2020-01-01 PROCEDURE — 2580000003 HC RX 258: Performed by: NURSE PRACTITIONER

## 2020-01-01 PROCEDURE — 85025 COMPLETE CBC W/AUTO DIFF WBC: CPT

## 2020-01-01 PROCEDURE — 94002 VENT MGMT INPAT INIT DAY: CPT

## 2020-01-01 PROCEDURE — 99222 1ST HOSP IP/OBS MODERATE 55: CPT | Performed by: FAMILY MEDICINE

## 2020-01-01 PROCEDURE — 80076 HEPATIC FUNCTION PANEL: CPT

## 2020-01-01 PROCEDURE — 99233 SBSQ HOSP IP/OBS HIGH 50: CPT | Performed by: INTERNAL MEDICINE

## 2020-01-01 PROCEDURE — 89220 SPUTUM SPECIMEN COLLECTION: CPT

## 2020-01-01 PROCEDURE — 82947 ASSAY GLUCOSE BLOOD QUANT: CPT

## 2020-01-01 PROCEDURE — 84165 PROTEIN E-PHORESIS SERUM: CPT

## 2020-01-01 PROCEDURE — 36620 INSERTION CATHETER ARTERY: CPT

## 2020-01-01 PROCEDURE — 84155 ASSAY OF PROTEIN SERUM: CPT

## 2020-01-01 PROCEDURE — 85018 HEMOGLOBIN: CPT

## 2020-01-01 PROCEDURE — 31500 INSERT EMERGENCY AIRWAY: CPT | Performed by: NURSE ANESTHETIST, CERTIFIED REGISTERED

## 2020-01-01 PROCEDURE — 36556 INSERT NON-TUNNEL CV CATH: CPT

## 2020-01-01 PROCEDURE — 87340 HEPATITIS B SURFACE AG IA: CPT

## 2020-01-01 PROCEDURE — 2500000003 HC RX 250 WO HCPCS: Performed by: INTERNAL MEDICINE

## 2020-01-01 PROCEDURE — 82728 ASSAY OF FERRITIN: CPT

## 2020-01-01 PROCEDURE — 87899 AGENT NOS ASSAY W/OPTIC: CPT

## 2020-01-01 PROCEDURE — 36415 COLL VENOUS BLD VENIPUNCTURE: CPT

## 2020-01-01 PROCEDURE — 82140 ASSAY OF AMMONIA: CPT

## 2020-01-01 PROCEDURE — 82435 ASSAY OF BLOOD CHLORIDE: CPT

## 2020-01-01 PROCEDURE — 99498 ADVNCD CARE PLAN ADDL 30 MIN: CPT | Performed by: FAMILY MEDICINE

## 2020-01-01 PROCEDURE — 87040 BLOOD CULTURE FOR BACTERIA: CPT

## 2020-01-01 PROCEDURE — 84484 ASSAY OF TROPONIN QUANT: CPT

## 2020-01-01 PROCEDURE — 87086 URINE CULTURE/COLONY COUNT: CPT

## 2020-01-01 PROCEDURE — 82330 ASSAY OF CALCIUM: CPT

## 2020-01-01 PROCEDURE — 2580000003 HC RX 258: Performed by: INTERNAL MEDICINE

## 2020-01-01 PROCEDURE — 86704 HEP B CORE ANTIBODY TOTAL: CPT

## 2020-01-01 PROCEDURE — 36620 INSERTION CATHETER ARTERY: CPT | Performed by: NURSE ANESTHETIST, CERTIFIED REGISTERED

## 2020-01-01 PROCEDURE — 06HN33Z INSERTION OF INFUSION DEVICE INTO LEFT FEMORAL VEIN, PERCUTANEOUS APPROACH: ICD-10-PCS | Performed by: SURGERY

## 2020-01-01 PROCEDURE — 86140 C-REACTIVE PROTEIN: CPT

## 2020-01-01 PROCEDURE — 6360000002 HC RX W HCPCS

## 2020-01-01 PROCEDURE — 99497 ADVNCD CARE PLAN 30 MIN: CPT | Performed by: FAMILY MEDICINE

## 2020-01-01 PROCEDURE — 36556 INSERT NON-TUNNEL CV CATH: CPT | Performed by: SURGERY

## 2020-01-01 PROCEDURE — 99285 EMERGENCY DEPT VISIT HI MDM: CPT

## 2020-01-01 PROCEDURE — 51702 INSERT TEMP BLADDER CATH: CPT

## 2020-01-01 PROCEDURE — 87449 NOS EACH ORGANISM AG IA: CPT

## 2020-01-01 PROCEDURE — 81001 URINALYSIS AUTO W/SCOPE: CPT

## 2020-01-01 PROCEDURE — 87641 MR-STAPH DNA AMP PROBE: CPT

## 2020-01-01 PROCEDURE — B54CZZA ULTRASONOGRAPHY OF LEFT LOWER EXTREMITY VEINS, GUIDANCE: ICD-10-PCS | Performed by: SURGERY

## 2020-01-01 PROCEDURE — 31500 INSERT EMERGENCY AIRWAY: CPT | Performed by: INTERNAL MEDICINE

## 2020-01-01 PROCEDURE — 84145 PROCALCITONIN (PCT): CPT

## 2020-01-01 PROCEDURE — 99232 SBSQ HOSP IP/OBS MODERATE 35: CPT | Performed by: INTERNAL MEDICINE

## 2020-01-01 PROCEDURE — 99999 PR OFFICE/OUTPT VISIT,PROCEDURE ONLY: CPT | Performed by: PODIATRIST

## 2020-01-01 PROCEDURE — 31500 INSERT EMERGENCY AIRWAY: CPT

## 2020-01-01 PROCEDURE — 86738 MYCOPLASMA ANTIBODY: CPT

## 2020-01-01 PROCEDURE — 0100U HC RESPIRPTHGN MULT REV TRANS & AMP PRB TECH 21 TRGT: CPT

## 2020-01-01 RX ORDER — MINERAL OIL AND WHITE PETROLATUM 150; 830 MG/G; MG/G
OINTMENT OPHTHALMIC PRN
Status: DISCONTINUED | OUTPATIENT
Start: 2020-01-01 | End: 2020-01-01 | Stop reason: HOSPADM

## 2020-01-01 RX ORDER — LEVOFLOXACIN 5 MG/ML
750 INJECTION, SOLUTION INTRAVENOUS ONCE
Status: COMPLETED | OUTPATIENT
Start: 2020-01-01 | End: 2020-01-01

## 2020-01-01 RX ORDER — PREDNISONE 10 MG/1
10 TABLET ORAL DAILY
Status: DISCONTINUED | OUTPATIENT
Start: 2020-01-01 | End: 2020-01-01

## 2020-01-01 RX ORDER — NICOTINE POLACRILEX 4 MG
15 LOZENGE BUCCAL PRN
Status: DISCONTINUED | OUTPATIENT
Start: 2020-01-01 | End: 2020-01-01

## 2020-01-01 RX ORDER — HYDROXYCHLOROQUINE SULFATE 200 MG/1
200 TABLET, FILM COATED ORAL 2 TIMES DAILY
Status: COMPLETED | OUTPATIENT
Start: 2020-01-01 | End: 2020-01-01

## 2020-01-01 RX ORDER — BENZONATATE 100 MG/1
100 CAPSULE ORAL 3 TIMES DAILY PRN
COMMUNITY

## 2020-01-01 RX ORDER — LORAZEPAM 2 MG/ML
1 INJECTION INTRAMUSCULAR
Status: DISCONTINUED | OUTPATIENT
Start: 2020-01-01 | End: 2020-01-01 | Stop reason: HOSPADM

## 2020-01-01 RX ORDER — FENTANYL CITRATE 50 UG/ML
50 INJECTION, SOLUTION INTRAMUSCULAR; INTRAVENOUS EVERY 10 MIN PRN
Status: DISCONTINUED | OUTPATIENT
Start: 2020-01-01 | End: 2020-01-01 | Stop reason: HOSPADM

## 2020-01-01 RX ORDER — CLOPIDOGREL BISULFATE 75 MG/1
75 TABLET ORAL ONCE
Status: COMPLETED | OUTPATIENT
Start: 2020-01-01 | End: 2020-01-01

## 2020-01-01 RX ORDER — SODIUM POLYSTYRENE SULFONATE 15 G/60ML
15 SUSPENSION ORAL; RECTAL ONCE
Status: COMPLETED | OUTPATIENT
Start: 2020-01-01 | End: 2020-01-01

## 2020-01-01 RX ORDER — ACETAMINOPHEN 650 MG/1
650 SUPPOSITORY RECTAL EVERY 6 HOURS PRN
Status: DISCONTINUED | OUTPATIENT
Start: 2020-01-01 | End: 2020-01-01

## 2020-01-01 RX ORDER — 0.9 % SODIUM CHLORIDE 0.9 %
1000 INTRAVENOUS SOLUTION INTRAVENOUS ONCE
Status: COMPLETED | OUTPATIENT
Start: 2020-01-01 | End: 2020-01-01

## 2020-01-01 RX ORDER — ONDANSETRON 2 MG/ML
4 INJECTION INTRAMUSCULAR; INTRAVENOUS EVERY 6 HOURS PRN
Status: DISCONTINUED | OUTPATIENT
Start: 2020-01-01 | End: 2020-01-01

## 2020-01-01 RX ORDER — SODIUM CHLORIDE 0.9 % (FLUSH) 0.9 %
10 SYRINGE (ML) INJECTION PRN
Status: DISCONTINUED | OUTPATIENT
Start: 2020-01-01 | End: 2020-01-01

## 2020-01-01 RX ORDER — CALCIUM GLUCONATE 20 MG/ML
2 INJECTION, SOLUTION INTRAVENOUS ONCE
Status: COMPLETED | OUTPATIENT
Start: 2020-01-01 | End: 2020-01-01

## 2020-01-01 RX ORDER — DEXTROSE MONOHYDRATE 50 MG/ML
100 INJECTION, SOLUTION INTRAVENOUS PRN
Status: DISCONTINUED | OUTPATIENT
Start: 2020-01-01 | End: 2020-01-01

## 2020-01-01 RX ORDER — FENTANYL CITRATE 50 UG/ML
INJECTION, SOLUTION INTRAMUSCULAR; INTRAVENOUS
Status: COMPLETED
Start: 2020-01-01 | End: 2020-01-01

## 2020-01-01 RX ORDER — POTASSIUM CHLORIDE 7.45 MG/ML
10 INJECTION INTRAVENOUS PRN
Status: DISCONTINUED | OUTPATIENT
Start: 2020-01-01 | End: 2020-01-01

## 2020-01-01 RX ORDER — HEPARIN SODIUM 1000 [USP'U]/ML
60 INJECTION, SOLUTION INTRAVENOUS; SUBCUTANEOUS PRN
Status: DISCONTINUED | OUTPATIENT
Start: 2020-01-01 | End: 2020-01-01 | Stop reason: ALTCHOICE

## 2020-01-01 RX ORDER — DEXTROSE MONOHYDRATE 25 G/50ML
10 INJECTION, SOLUTION INTRAVENOUS ONCE
Status: COMPLETED | OUTPATIENT
Start: 2020-01-01 | End: 2020-01-01

## 2020-01-01 RX ORDER — HEPARIN SODIUM 1000 [USP'U]/ML
1600 INJECTION, SOLUTION INTRAVENOUS; SUBCUTANEOUS PRN
Status: DISCONTINUED | OUTPATIENT
Start: 2020-01-01 | End: 2020-01-01 | Stop reason: ALTCHOICE

## 2020-01-01 RX ORDER — HEPARIN SODIUM 1000 [USP'U]/ML
1500 INJECTION, SOLUTION INTRAVENOUS; SUBCUTANEOUS PRN
Status: DISCONTINUED | OUTPATIENT
Start: 2020-01-01 | End: 2020-01-01 | Stop reason: ALTCHOICE

## 2020-01-01 RX ORDER — METOPROLOL TARTRATE 5 MG/5ML
5 INJECTION INTRAVENOUS EVERY 4 HOURS PRN
Status: DISCONTINUED | OUTPATIENT
Start: 2020-01-01 | End: 2020-01-01

## 2020-01-01 RX ORDER — SODIUM CHLORIDE 0.9 % (FLUSH) 0.9 %
10 SYRINGE (ML) INJECTION EVERY 12 HOURS SCHEDULED
Status: CANCELLED | OUTPATIENT
Start: 2020-01-01

## 2020-01-01 RX ORDER — LABETALOL 20 MG/4 ML (5 MG/ML) INTRAVENOUS SYRINGE
10 ONCE
Status: DISCONTINUED | OUTPATIENT
Start: 2020-01-01 | End: 2020-01-01

## 2020-01-01 RX ORDER — 0.9 % SODIUM CHLORIDE 0.9 %
500 INTRAVENOUS SOLUTION INTRAVENOUS ONCE
Status: COMPLETED | OUTPATIENT
Start: 2020-01-01 | End: 2020-01-01

## 2020-01-01 RX ORDER — ASPIRIN 81 MG/1
81 TABLET ORAL DAILY
Status: DISCONTINUED | OUTPATIENT
Start: 2020-01-01 | End: 2020-01-01

## 2020-01-01 RX ORDER — CLOPIDOGREL BISULFATE 75 MG/1
75 TABLET ORAL DAILY
Status: DISCONTINUED | OUTPATIENT
Start: 2020-01-01 | End: 2020-01-01

## 2020-01-01 RX ORDER — BUMETANIDE 0.25 MG/ML
1 INJECTION, SOLUTION INTRAMUSCULAR; INTRAVENOUS 2 TIMES DAILY
Status: DISCONTINUED | OUTPATIENT
Start: 2020-01-01 | End: 2020-01-01

## 2020-01-01 RX ORDER — HEPARIN SODIUM 5000 [USP'U]/ML
5000 INJECTION, SOLUTION INTRAVENOUS; SUBCUTANEOUS EVERY 8 HOURS SCHEDULED
Status: DISCONTINUED | OUTPATIENT
Start: 2020-01-01 | End: 2020-01-01

## 2020-01-01 RX ORDER — ONDANSETRON 4 MG/1
4 TABLET, FILM COATED ORAL EVERY 8 HOURS PRN
COMMUNITY

## 2020-01-01 RX ORDER — FLUDROCORTISONE ACETATE 0.1 MG/1
0.1 TABLET ORAL DAILY
Status: DISCONTINUED | OUTPATIENT
Start: 2020-01-01 | End: 2020-01-01

## 2020-01-01 RX ORDER — POTASSIUM CHLORIDE 7.45 MG/ML
10 INJECTION INTRAVENOUS ONCE
Status: DISCONTINUED | OUTPATIENT
Start: 2020-01-01 | End: 2020-01-01

## 2020-01-01 RX ORDER — ATORVASTATIN CALCIUM 20 MG/1
20 TABLET, FILM COATED ORAL DAILY
Status: DISCONTINUED | OUTPATIENT
Start: 2020-01-01 | End: 2020-01-01

## 2020-01-01 RX ORDER — ACETAMINOPHEN 325 MG/1
650 TABLET ORAL ONCE
Status: COMPLETED | OUTPATIENT
Start: 2020-01-01 | End: 2020-01-01

## 2020-01-01 RX ORDER — HEPARIN SODIUM 1000 [USP'U]/ML
30 INJECTION, SOLUTION INTRAVENOUS; SUBCUTANEOUS PRN
Status: DISCONTINUED | OUTPATIENT
Start: 2020-01-01 | End: 2020-01-01 | Stop reason: ALTCHOICE

## 2020-01-01 RX ORDER — POTASSIUM CHLORIDE 20 MEQ/1
20 TABLET, EXTENDED RELEASE ORAL ONCE
Status: COMPLETED | OUTPATIENT
Start: 2020-01-01 | End: 2020-01-01

## 2020-01-01 RX ORDER — LORAZEPAM 2 MG/ML
0.5 INJECTION INTRAMUSCULAR
Status: DISCONTINUED | OUTPATIENT
Start: 2020-01-01 | End: 2020-01-01 | Stop reason: HOSPADM

## 2020-01-01 RX ORDER — FUROSEMIDE 10 MG/ML
40 INJECTION INTRAMUSCULAR; INTRAVENOUS DAILY
Status: DISCONTINUED | OUTPATIENT
Start: 2020-01-01 | End: 2020-01-01

## 2020-01-01 RX ORDER — SODIUM CHLORIDE 450 MG/100ML
INJECTION, SOLUTION INTRAVENOUS CONTINUOUS
Status: DISCONTINUED | OUTPATIENT
Start: 2020-01-01 | End: 2020-01-01

## 2020-01-01 RX ORDER — HYDROCODONE BITARTRATE AND ACETAMINOPHEN 5; 325 MG/1; MG/1
1 TABLET ORAL EVERY 6 HOURS PRN
Status: DISCONTINUED | OUTPATIENT
Start: 2020-01-01 | End: 2020-01-01

## 2020-01-01 RX ORDER — FLUDROCORTISONE ACETATE 0.1 MG/1
0.1 TABLET ORAL ONCE
Status: COMPLETED | OUTPATIENT
Start: 2020-01-01 | End: 2020-01-01

## 2020-01-01 RX ORDER — HYDROXYCHLOROQUINE SULFATE 200 MG/1
400 TABLET, FILM COATED ORAL 2 TIMES DAILY
Status: DISCONTINUED | OUTPATIENT
Start: 2020-01-01 | End: 2020-01-01

## 2020-01-01 RX ORDER — POTASSIUM CHLORIDE 20MEQ/15ML
40 LIQUID (ML) ORAL 2 TIMES DAILY
Status: COMPLETED | OUTPATIENT
Start: 2020-01-01 | End: 2020-01-01

## 2020-01-01 RX ORDER — PROMETHAZINE HYDROCHLORIDE 25 MG/1
12.5 TABLET ORAL EVERY 6 HOURS PRN
Status: DISCONTINUED | OUTPATIENT
Start: 2020-01-01 | End: 2020-01-01

## 2020-01-01 RX ORDER — DEXTROSE MONOHYDRATE 25 G/50ML
25 INJECTION, SOLUTION INTRAVENOUS ONCE
Status: COMPLETED | OUTPATIENT
Start: 2020-01-01 | End: 2020-01-01

## 2020-01-01 RX ORDER — PROPOFOL 10 MG/ML
10 INJECTION, EMULSION INTRAVENOUS
Status: DISCONTINUED | OUTPATIENT
Start: 2020-01-01 | End: 2020-01-01

## 2020-01-01 RX ORDER — SODIUM CHLORIDE 9 MG/ML
INJECTION, SOLUTION INTRAVENOUS CONTINUOUS
Status: DISCONTINUED | OUTPATIENT
Start: 2020-01-01 | End: 2020-01-01

## 2020-01-01 RX ORDER — POTASSIUM CHLORIDE 20MEQ/15ML
20 LIQUID (ML) ORAL 2 TIMES DAILY
Status: DISCONTINUED | OUTPATIENT
Start: 2020-01-01 | End: 2020-01-01

## 2020-01-01 RX ORDER — POTASSIUM CHLORIDE 29.8 MG/ML
20 INJECTION INTRAVENOUS PRN
Status: DISCONTINUED | OUTPATIENT
Start: 2020-01-01 | End: 2020-01-01

## 2020-01-01 RX ORDER — POTASSIUM CHLORIDE 29.8 MG/ML
20 INJECTION INTRAVENOUS ONCE
Status: COMPLETED | OUTPATIENT
Start: 2020-01-01 | End: 2020-01-01

## 2020-01-01 RX ORDER — HYDROXYCHLOROQUINE SULFATE 200 MG/1
400 TABLET, FILM COATED ORAL 2 TIMES DAILY
Status: COMPLETED | OUTPATIENT
Start: 2020-01-01 | End: 2020-01-01

## 2020-01-01 RX ORDER — ASPIRIN 81 MG/1
81 TABLET ORAL DAILY
COMMUNITY

## 2020-01-01 RX ORDER — ACETAMINOPHEN 325 MG/1
650 TABLET ORAL EVERY 6 HOURS PRN
Status: DISCONTINUED | OUTPATIENT
Start: 2020-01-01 | End: 2020-01-01

## 2020-01-01 RX ORDER — POTASSIUM CHLORIDE 20 MEQ/1
20 TABLET, EXTENDED RELEASE ORAL ONCE
Status: DISCONTINUED | OUTPATIENT
Start: 2020-01-01 | End: 2020-01-01

## 2020-01-01 RX ORDER — SODIUM CHLORIDE 9 MG/ML
INJECTION, SOLUTION INTRAVENOUS CONTINUOUS
Status: CANCELLED | OUTPATIENT
Start: 2020-01-01

## 2020-01-01 RX ORDER — ASPIRIN 81 MG/1
81 TABLET, CHEWABLE ORAL DAILY
Status: DISCONTINUED | OUTPATIENT
Start: 2020-01-01 | End: 2020-01-01

## 2020-01-01 RX ORDER — AMLODIPINE BESYLATE 5 MG/1
5 TABLET ORAL DAILY
Status: DISCONTINUED | OUTPATIENT
Start: 2020-01-01 | End: 2020-01-01

## 2020-01-01 RX ORDER — FUROSEMIDE 10 MG/ML
40 INJECTION INTRAMUSCULAR; INTRAVENOUS ONCE
Status: COMPLETED | OUTPATIENT
Start: 2020-01-01 | End: 2020-01-01

## 2020-01-01 RX ORDER — FENTANYL CITRATE 50 UG/ML
100 INJECTION, SOLUTION INTRAMUSCULAR; INTRAVENOUS ONCE
Status: COMPLETED | OUTPATIENT
Start: 2020-01-01 | End: 2020-01-01

## 2020-01-01 RX ORDER — SODIUM CHLORIDE 0.9 % (FLUSH) 0.9 %
10 SYRINGE (ML) INJECTION EVERY 12 HOURS SCHEDULED
Status: DISCONTINUED | OUTPATIENT
Start: 2020-01-01 | End: 2020-01-01

## 2020-01-01 RX ORDER — DEXTROSE MONOHYDRATE 25 G/50ML
12.5 INJECTION, SOLUTION INTRAVENOUS PRN
Status: DISCONTINUED | OUTPATIENT
Start: 2020-01-01 | End: 2020-01-01

## 2020-01-01 RX ORDER — POLYETHYLENE GLYCOL 3350 17 G/17G
17 POWDER, FOR SOLUTION ORAL DAILY PRN
Status: DISCONTINUED | OUTPATIENT
Start: 2020-01-01 | End: 2020-01-01 | Stop reason: HOSPADM

## 2020-01-01 RX ORDER — HEPARIN SODIUM 10000 [USP'U]/100ML
12 INJECTION, SOLUTION INTRAVENOUS CONTINUOUS
Status: DISCONTINUED | OUTPATIENT
Start: 2020-01-01 | End: 2020-01-01 | Stop reason: ALTCHOICE

## 2020-01-01 RX ORDER — PREDNISONE 10 MG/1
10 TABLET ORAL DAILY
COMMUNITY

## 2020-01-01 RX ORDER — SODIUM CHLORIDE 0.9 % (FLUSH) 0.9 %
10 SYRINGE (ML) INJECTION PRN
Status: CANCELLED | OUTPATIENT
Start: 2020-01-01

## 2020-01-01 RX ORDER — MIDAZOLAM HYDROCHLORIDE 1 MG/ML
INJECTION INTRAMUSCULAR; INTRAVENOUS
Status: COMPLETED
Start: 2020-01-01 | End: 2020-01-01

## 2020-01-01 RX ORDER — PROCHLORPERAZINE EDISYLATE 5 MG/ML
10 INJECTION INTRAMUSCULAR; INTRAVENOUS EVERY 6 HOURS PRN
Status: DISCONTINUED | OUTPATIENT
Start: 2020-01-01 | End: 2020-01-01 | Stop reason: HOSPADM

## 2020-01-01 RX ORDER — ALBUTEROL SULFATE 2.5 MG/3ML
2.5 SOLUTION RESPIRATORY (INHALATION) EVERY 4 HOURS
Status: DISCONTINUED | OUTPATIENT
Start: 2020-01-01 | End: 2020-01-01

## 2020-01-01 RX ORDER — FENTANYL CITRATE 50 UG/ML
25 INJECTION, SOLUTION INTRAMUSCULAR; INTRAVENOUS EVERY 10 MIN PRN
Status: DISCONTINUED | OUTPATIENT
Start: 2020-01-01 | End: 2020-01-01 | Stop reason: HOSPADM

## 2020-01-01 RX ORDER — CALCIUM CARBONATE 200(500)MG
2 TABLET,CHEWABLE ORAL DAILY
COMMUNITY

## 2020-01-01 RX ORDER — GLYCOPYRROLATE 0.2 MG/ML
0.2 INJECTION INTRAMUSCULAR; INTRAVENOUS EVERY 4 HOURS PRN
Status: DISCONTINUED | OUTPATIENT
Start: 2020-01-01 | End: 2020-01-01 | Stop reason: HOSPADM

## 2020-01-01 RX ADMIN — HEPARIN SODIUM 5000 UNITS: 5000 INJECTION INTRAVENOUS; SUBCUTANEOUS at 22:49

## 2020-01-01 RX ADMIN — HEPARIN SODIUM 5000 UNITS: 5000 INJECTION INTRAVENOUS; SUBCUTANEOUS at 13:42

## 2020-01-01 RX ADMIN — HEPARIN SODIUM 5000 UNITS: 5000 INJECTION INTRAVENOUS; SUBCUTANEOUS at 20:20

## 2020-01-01 RX ADMIN — BUMETANIDE 1 MG: 0.25 INJECTION INTRAMUSCULAR; INTRAVENOUS at 13:29

## 2020-01-01 RX ADMIN — Medication 10 ML: at 09:03

## 2020-01-01 RX ADMIN — LEVOFLOXACIN 750 MG: 5 INJECTION, SOLUTION INTRAVENOUS at 06:02

## 2020-01-01 RX ADMIN — CLOPIDOGREL 75 MG: 75 TABLET, FILM COATED ORAL at 09:09

## 2020-01-01 RX ADMIN — FAMOTIDINE 20 MG: 10 INJECTION, SOLUTION INTRAVENOUS at 11:12

## 2020-01-01 RX ADMIN — VECURONIUM BROMIDE 0.5 MCG/KG/MIN: 1 INJECTION, POWDER, LYOPHILIZED, FOR SOLUTION INTRAVENOUS at 15:39

## 2020-01-01 RX ADMIN — AZITHROMYCIN MONOHYDRATE 250 MG: 500 INJECTION, POWDER, LYOPHILIZED, FOR SOLUTION INTRAVENOUS at 10:04

## 2020-01-01 RX ADMIN — HYDROCORTISONE SODIUM SUCCINATE 50 MG: 100 INJECTION, POWDER, FOR SOLUTION INTRAMUSCULAR; INTRAVENOUS at 22:12

## 2020-01-01 RX ADMIN — Medication 10 MG/HR: at 05:53

## 2020-01-01 RX ADMIN — SODIUM CHLORIDE, PRESERVATIVE FREE 10 ML: 5 INJECTION INTRAVENOUS at 08:47

## 2020-01-01 RX ADMIN — ACETAMINOPHEN 650 MG: 325 TABLET ORAL at 22:43

## 2020-01-01 RX ADMIN — SODIUM BICARBONATE 50 MEQ: 84 INJECTION INTRAVENOUS at 12:43

## 2020-01-01 RX ADMIN — Medication 125 MCG/HR: at 23:00

## 2020-01-01 RX ADMIN — Medication 150 MCG/HR: at 15:02

## 2020-01-01 RX ADMIN — HEPARIN SODIUM 5000 UNITS: 5000 INJECTION INTRAVENOUS; SUBCUTANEOUS at 21:42

## 2020-01-01 RX ADMIN — ATORVASTATIN CALCIUM 20 MG: 20 TABLET, FILM COATED ORAL at 08:49

## 2020-01-01 RX ADMIN — POTASSIUM BICARBONATE 40 MEQ: 782 TABLET, EFFERVESCENT ORAL at 08:49

## 2020-01-01 RX ADMIN — HYDROCORTISONE SODIUM SUCCINATE 100 MG: 100 INJECTION, POWDER, FOR SOLUTION INTRAMUSCULAR; INTRAVENOUS at 04:59

## 2020-01-01 RX ADMIN — ATORVASTATIN CALCIUM 20 MG: 20 TABLET, FILM COATED ORAL at 08:40

## 2020-01-01 RX ADMIN — Medication 500 MG: at 10:00

## 2020-01-01 RX ADMIN — SODIUM CHLORIDE, PRESERVATIVE FREE 10 ML: 5 INJECTION INTRAVENOUS at 10:05

## 2020-01-01 RX ADMIN — HYDROCORTISONE SODIUM SUCCINATE 50 MG: 100 INJECTION, POWDER, FOR SOLUTION INTRAMUSCULAR; INTRAVENOUS at 05:21

## 2020-01-01 RX ADMIN — BUMETANIDE 1 MG: 0.25 INJECTION INTRAMUSCULAR; INTRAVENOUS at 08:43

## 2020-01-01 RX ADMIN — FENTANYL CITRATE 50 MCG: 50 INJECTION INTRAMUSCULAR; INTRAVENOUS at 16:39

## 2020-01-01 RX ADMIN — Medication 4 MG/HR: at 03:14

## 2020-01-01 RX ADMIN — HEPARIN SODIUM 5000 UNITS: 5000 INJECTION INTRAVENOUS; SUBCUTANEOUS at 21:15

## 2020-01-01 RX ADMIN — AMLODIPINE BESYLATE 5 MG: 5 TABLET ORAL at 12:10

## 2020-01-01 RX ADMIN — POTASSIUM CHLORIDE 20 MEQ: 40 SOLUTION ORAL at 08:35

## 2020-01-01 RX ADMIN — CLOPIDOGREL 75 MG: 75 TABLET, FILM COATED ORAL at 08:34

## 2020-01-01 RX ADMIN — SODIUM CHLORIDE, PRESERVATIVE FREE 10 ML: 5 INJECTION INTRAVENOUS at 22:12

## 2020-01-01 RX ADMIN — POTASSIUM CHLORIDE 20 MEQ: 400 INJECTION, SOLUTION INTRAVENOUS at 21:00

## 2020-01-01 RX ADMIN — HYDROCORTISONE SODIUM SUCCINATE 100 MG: 100 INJECTION, POWDER, FOR SOLUTION INTRAMUSCULAR; INTRAVENOUS at 22:25

## 2020-01-01 RX ADMIN — Medication 150 MCG/HR: at 21:15

## 2020-01-01 RX ADMIN — POTASSIUM BICARBONATE 40 MEQ: 782 TABLET, EFFERVESCENT ORAL at 20:20

## 2020-01-01 RX ADMIN — Medication 100 MCG/HR: at 09:30

## 2020-01-01 RX ADMIN — HYDROCORTISONE SODIUM SUCCINATE 100 MG: 100 INJECTION, POWDER, FOR SOLUTION INTRAMUSCULAR; INTRAVENOUS at 21:28

## 2020-01-01 RX ADMIN — CLOPIDOGREL 75 MG: 75 TABLET, FILM COATED ORAL at 08:40

## 2020-01-01 RX ADMIN — SODIUM CHLORIDE 500 ML: 9 INJECTION, SOLUTION INTRAVENOUS at 17:05

## 2020-01-01 RX ADMIN — DEXTROSE MONOHYDRATE 10 ML: 25 INJECTION, SOLUTION INTRAVENOUS at 13:30

## 2020-01-01 RX ADMIN — ACETAMINOPHEN 650 MG: 325 TABLET ORAL at 00:12

## 2020-01-01 RX ADMIN — HYDROCORTISONE SODIUM SUCCINATE 50 MG: 100 INJECTION, POWDER, FOR SOLUTION INTRAMUSCULAR; INTRAVENOUS at 05:04

## 2020-01-01 RX ADMIN — CLOPIDOGREL 75 MG: 75 TABLET, FILM COATED ORAL at 12:45

## 2020-01-01 RX ADMIN — SODIUM CHLORIDE 500 ML: 0.9 INJECTION, SOLUTION INTRAVENOUS at 13:00

## 2020-01-01 RX ADMIN — SODIUM BICARBONATE 100 MEQ: 84 INJECTION, SOLUTION INTRAVENOUS at 02:35

## 2020-01-01 RX ADMIN — HEPARIN SODIUM 5000 UNITS: 5000 INJECTION INTRAVENOUS; SUBCUTANEOUS at 05:03

## 2020-01-01 RX ADMIN — SODIUM CHLORIDE, PRESERVATIVE FREE 10 ML: 5 INJECTION INTRAVENOUS at 23:47

## 2020-01-01 RX ADMIN — HYDROXYCHLOROQUINE SULFATE 400 MG: 200 TABLET ORAL at 12:10

## 2020-01-01 RX ADMIN — VECURONIUM BROMIDE 0.5 MCG/KG/MIN: 1 INJECTION, POWDER, LYOPHILIZED, FOR SOLUTION INTRAVENOUS at 21:13

## 2020-01-01 RX ADMIN — Medication 10 MG/HR: at 13:29

## 2020-01-01 RX ADMIN — HEPARIN SODIUM 5000 UNITS: 5000 INJECTION INTRAVENOUS; SUBCUTANEOUS at 12:52

## 2020-01-01 RX ADMIN — Medication 150 MCG/HR: at 05:35

## 2020-01-01 RX ADMIN — BUMETANIDE 1 MG: 0.25 INJECTION INTRAMUSCULAR; INTRAVENOUS at 21:14

## 2020-01-01 RX ADMIN — CLOPIDOGREL 75 MG: 75 TABLET, FILM COATED ORAL at 09:12

## 2020-01-01 RX ADMIN — DEXTROSE MONOHYDRATE 25 G: 25 INJECTION, SOLUTION INTRAVENOUS at 09:10

## 2020-01-01 RX ADMIN — HYDROCORTISONE SODIUM SUCCINATE 50 MG: 100 INJECTION, POWDER, FOR SOLUTION INTRAMUSCULAR; INTRAVENOUS at 06:02

## 2020-01-01 RX ADMIN — VASOPRESSIN 0.02 UNITS/MIN: 20 INJECTION INTRAVENOUS at 17:45

## 2020-01-01 RX ADMIN — ACETAMINOPHEN 650 MG: 325 TABLET ORAL at 20:27

## 2020-01-01 RX ADMIN — HYDROXYCHLOROQUINE SULFATE 200 MG: 200 TABLET ORAL at 08:40

## 2020-01-01 RX ADMIN — Medication 81 MG: at 09:12

## 2020-01-01 RX ADMIN — Medication 25 MCG/HR: at 12:45

## 2020-01-01 RX ADMIN — HYDROCORTISONE SODIUM SUCCINATE 50 MG: 100 INJECTION, POWDER, FOR SOLUTION INTRAMUSCULAR; INTRAVENOUS at 22:09

## 2020-01-01 RX ADMIN — ASPIRIN 81 MG: 81 TABLET ORAL at 11:11

## 2020-01-01 RX ADMIN — HYDROCORTISONE SODIUM SUCCINATE 50 MG: 100 INJECTION, POWDER, FOR SOLUTION INTRAMUSCULAR; INTRAVENOUS at 17:05

## 2020-01-01 RX ADMIN — HYDROCORTISONE SODIUM SUCCINATE 50 MG: 100 INJECTION, POWDER, FOR SOLUTION INTRAMUSCULAR; INTRAVENOUS at 06:08

## 2020-01-01 RX ADMIN — HYDROXYCHLOROQUINE SULFATE 200 MG: 200 TABLET ORAL at 08:49

## 2020-01-01 RX ADMIN — SODIUM ZIRCONIUM CYCLOSILICATE 10 G: 5 POWDER, FOR SUSPENSION ORAL at 09:01

## 2020-01-01 RX ADMIN — AZITHROMYCIN MONOHYDRATE 250 MG: 500 INJECTION, POWDER, LYOPHILIZED, FOR SOLUTION INTRAVENOUS at 08:35

## 2020-01-01 RX ADMIN — HYDROXYCHLOROQUINE SULFATE 200 MG: 200 TABLET ORAL at 22:12

## 2020-01-01 RX ADMIN — Medication 10 MG/HR: at 21:14

## 2020-01-01 RX ADMIN — Medication 100 MCG/HR: at 18:39

## 2020-01-01 RX ADMIN — HEPARIN SODIUM 1990 UNITS: 1000 INJECTION INTRAVENOUS; SUBCUTANEOUS at 05:20

## 2020-01-01 RX ADMIN — Medication 81 MG: at 08:34

## 2020-01-01 RX ADMIN — SODIUM CHLORIDE, PRESERVATIVE FREE 10 ML: 5 INJECTION INTRAVENOUS at 08:40

## 2020-01-01 RX ADMIN — HEPARIN SODIUM 14 UNITS/KG/HR: 10000 INJECTION, SOLUTION INTRAVENOUS at 23:21

## 2020-01-01 RX ADMIN — SODIUM CHLORIDE, PRESERVATIVE FREE 10 ML: 5 INJECTION INTRAVENOUS at 09:13

## 2020-01-01 RX ADMIN — HYDROXYCHLOROQUINE SULFATE 400 MG: 200 TABLET ORAL at 23:30

## 2020-01-01 RX ADMIN — ATORVASTATIN CALCIUM 20 MG: 20 TABLET, FILM COATED ORAL at 09:12

## 2020-01-01 RX ADMIN — HEPARIN SODIUM 1990 UNITS: 1000 INJECTION INTRAVENOUS; SUBCUTANEOUS at 14:45

## 2020-01-01 RX ADMIN — FAMOTIDINE 20 MG: 10 INJECTION, SOLUTION INTRAVENOUS at 08:34

## 2020-01-01 RX ADMIN — ATORVASTATIN CALCIUM 20 MG: 20 TABLET, FILM COATED ORAL at 10:04

## 2020-01-01 RX ADMIN — POTASSIUM BICARBONATE 40 MEQ: 782 TABLET, EFFERVESCENT ORAL at 10:13

## 2020-01-01 RX ADMIN — CALCIUM GLUCONATE 2 G: 20 INJECTION, SOLUTION INTRAVENOUS at 20:48

## 2020-01-01 RX ADMIN — HYDROCORTISONE SODIUM SUCCINATE 50 MG: 100 INJECTION, POWDER, FOR SOLUTION INTRAMUSCULAR; INTRAVENOUS at 15:37

## 2020-01-01 RX ADMIN — HYDROCORTISONE SODIUM SUCCINATE 100 MG: 100 INJECTION, POWDER, FOR SOLUTION INTRAMUSCULAR; INTRAVENOUS at 04:58

## 2020-01-01 RX ADMIN — CLOPIDOGREL 75 MG: 75 TABLET, FILM COATED ORAL at 08:45

## 2020-01-01 RX ADMIN — POTASSIUM CHLORIDE 20 MEQ: 40 SOLUTION ORAL at 20:27

## 2020-01-01 RX ADMIN — HEPARIN SODIUM 5000 UNITS: 5000 INJECTION INTRAVENOUS; SUBCUTANEOUS at 13:40

## 2020-01-01 RX ADMIN — HYDROCORTISONE SODIUM SUCCINATE 50 MG: 100 INJECTION, POWDER, FOR SOLUTION INTRAMUSCULAR; INTRAVENOUS at 05:52

## 2020-01-01 RX ADMIN — POTASSIUM CHLORIDE 40 MEQ: 40 SOLUTION ORAL at 09:09

## 2020-01-01 RX ADMIN — ASPIRIN 81 MG: 81 TABLET ORAL at 08:42

## 2020-01-01 RX ADMIN — Medication 150 MCG/HR: at 05:21

## 2020-01-01 RX ADMIN — PROPOFOL INJECTABLE EMULSION 20 MCG/KG/MIN: 10 INJECTION, EMULSION INTRAVENOUS at 08:32

## 2020-01-01 RX ADMIN — HEPARIN SODIUM 1500 UNITS: 1000 INJECTION INTRAVENOUS; SUBCUTANEOUS at 21:00

## 2020-01-01 RX ADMIN — SODIUM CHLORIDE, PRESERVATIVE FREE 10 ML: 5 INJECTION INTRAVENOUS at 09:02

## 2020-01-01 RX ADMIN — FAMOTIDINE 20 MG: 10 INJECTION, SOLUTION INTRAVENOUS at 09:01

## 2020-01-01 RX ADMIN — BUMETANIDE 1 MG: 0.25 INJECTION INTRAMUSCULAR; INTRAVENOUS at 23:40

## 2020-01-01 RX ADMIN — POTASSIUM CHLORIDE 20 MEQ: 400 INJECTION, SOLUTION INTRAVENOUS at 05:03

## 2020-01-01 RX ADMIN — SODIUM POLYSTYRENE SULFONATE 15 G: 15 SUSPENSION ORAL; RECTAL at 14:16

## 2020-01-01 RX ADMIN — AZITHROMYCIN MONOHYDRATE 250 MG: 500 INJECTION, POWDER, LYOPHILIZED, FOR SOLUTION INTRAVENOUS at 08:39

## 2020-01-01 RX ADMIN — SODIUM ZIRCONIUM CYCLOSILICATE 10 G: 10 POWDER, FOR SUSPENSION ORAL at 17:35

## 2020-01-01 RX ADMIN — HEPARIN SODIUM 5000 UNITS: 5000 INJECTION INTRAVENOUS; SUBCUTANEOUS at 05:21

## 2020-01-01 RX ADMIN — SODIUM CHLORIDE: 9 INJECTION, SOLUTION INTRAVENOUS at 08:45

## 2020-01-01 RX ADMIN — Medication 20 MCG/HR: at 22:11

## 2020-01-01 RX ADMIN — FAMOTIDINE 20 MG: 10 INJECTION, SOLUTION INTRAVENOUS at 09:09

## 2020-01-01 RX ADMIN — INSULIN HUMAN 10 UNITS: 100 INJECTION, SOLUTION PARENTERAL at 13:30

## 2020-01-01 RX ADMIN — Medication 20 MCG/MIN: at 17:35

## 2020-01-01 RX ADMIN — FAMOTIDINE 20 MG: 10 INJECTION, SOLUTION INTRAVENOUS at 13:00

## 2020-01-01 RX ADMIN — FUROSEMIDE 40 MG: 10 INJECTION, SOLUTION INTRAMUSCULAR; INTRAVENOUS at 09:12

## 2020-01-01 RX ADMIN — SODIUM CHLORIDE, PRESERVATIVE FREE 10 ML: 5 INJECTION INTRAVENOUS at 20:49

## 2020-01-01 RX ADMIN — SODIUM CHLORIDE, PRESERVATIVE FREE 10 ML: 5 INJECTION INTRAVENOUS at 22:10

## 2020-01-01 RX ADMIN — SODIUM CHLORIDE, PRESERVATIVE FREE 10 ML: 5 INJECTION INTRAVENOUS at 23:21

## 2020-01-01 RX ADMIN — ATORVASTATIN CALCIUM 20 MG: 20 TABLET, FILM COATED ORAL at 12:10

## 2020-01-01 RX ADMIN — Medication 10 ML: at 11:48

## 2020-01-01 RX ADMIN — SODIUM CHLORIDE, PRESERVATIVE FREE 10 ML: 5 INJECTION INTRAVENOUS at 22:30

## 2020-01-01 RX ADMIN — VECURONIUM BROMIDE 0.5 MCG/KG/MIN: 1 INJECTION, POWDER, LYOPHILIZED, FOR SOLUTION INTRAVENOUS at 23:20

## 2020-01-01 RX ADMIN — Medication 1 MG/HR: at 22:11

## 2020-01-01 RX ADMIN — Medication 20 MCG/MIN: at 17:08

## 2020-01-01 RX ADMIN — HEPARIN SODIUM 5000 UNITS: 5000 INJECTION INTRAVENOUS; SUBCUTANEOUS at 23:30

## 2020-01-01 RX ADMIN — FUROSEMIDE 40 MG: 10 INJECTION, SOLUTION INTRAMUSCULAR; INTRAVENOUS at 08:34

## 2020-01-01 RX ADMIN — VECURONIUM BROMIDE 0.5 MCG/KG/MIN: 1 INJECTION, POWDER, LYOPHILIZED, FOR SOLUTION INTRAVENOUS at 18:39

## 2020-01-01 RX ADMIN — FUROSEMIDE 40 MG: 10 INJECTION, SOLUTION INTRAMUSCULAR; INTRAVENOUS at 11:48

## 2020-01-01 RX ADMIN — POTASSIUM CHLORIDE 20 MEQ: 400 INJECTION, SOLUTION INTRAVENOUS at 17:59

## 2020-01-01 RX ADMIN — SODIUM ZIRCONIUM CYCLOSILICATE 10 G: 5 POWDER, FOR SUSPENSION ORAL at 13:32

## 2020-01-01 RX ADMIN — CLOPIDOGREL BISULFATE 75 MG: 75 TABLET ORAL at 12:18

## 2020-01-01 RX ADMIN — FAMOTIDINE 20 MG: 10 INJECTION, SOLUTION INTRAVENOUS at 08:52

## 2020-01-01 RX ADMIN — HEPARIN SODIUM 5000 UNITS: 5000 INJECTION INTRAVENOUS; SUBCUTANEOUS at 15:37

## 2020-01-01 RX ADMIN — HYDROCORTISONE SODIUM SUCCINATE 50 MG: 100 INJECTION, POWDER, FOR SOLUTION INTRAMUSCULAR; INTRAVENOUS at 15:26

## 2020-01-01 RX ADMIN — FAMOTIDINE 20 MG: 10 INJECTION, SOLUTION INTRAVENOUS at 10:04

## 2020-01-01 RX ADMIN — FUROSEMIDE 40 MG: 10 INJECTION, SOLUTION INTRAMUSCULAR; INTRAVENOUS at 13:33

## 2020-01-01 RX ADMIN — SODIUM CHLORIDE, PRESERVATIVE FREE 10 ML: 5 INJECTION INTRAVENOUS at 08:52

## 2020-01-01 RX ADMIN — Medication 100 MCG/HR: at 00:12

## 2020-01-01 RX ADMIN — Medication 10 MCG/MIN: at 17:00

## 2020-01-01 RX ADMIN — HEPARIN SODIUM 1990 UNITS: 1000 INJECTION INTRAVENOUS; SUBCUTANEOUS at 00:07

## 2020-01-01 RX ADMIN — Medication 81 MG: at 08:31

## 2020-01-01 RX ADMIN — POTASSIUM CHLORIDE 40 MEQ: 40 SOLUTION ORAL at 21:13

## 2020-01-01 RX ADMIN — FENTANYL CITRATE 100 MCG: 50 INJECTION INTRAMUSCULAR; INTRAVENOUS at 11:28

## 2020-01-01 RX ADMIN — HEPARIN SODIUM 5000 UNITS: 5000 INJECTION INTRAVENOUS; SUBCUTANEOUS at 06:08

## 2020-01-01 RX ADMIN — POTASSIUM CHLORIDE 20 MEQ: 1500 TABLET, EXTENDED RELEASE ORAL at 23:30

## 2020-01-01 RX ADMIN — CLOPIDOGREL 75 MG: 75 TABLET, FILM COATED ORAL at 08:49

## 2020-01-01 RX ADMIN — Medication 6 MG/HR: at 00:13

## 2020-01-01 RX ADMIN — HEPARIN SODIUM 5000 UNITS: 5000 INJECTION INTRAVENOUS; SUBCUTANEOUS at 22:13

## 2020-01-01 RX ADMIN — Medication 81 MG: at 08:40

## 2020-01-01 RX ADMIN — BUMETANIDE 1 MG: 0.25 INJECTION INTRAMUSCULAR; INTRAVENOUS at 09:08

## 2020-01-01 RX ADMIN — Medication 27.73 MCG/MIN: at 04:07

## 2020-01-01 RX ADMIN — Medication 150 MEQ: at 13:28

## 2020-01-01 RX ADMIN — Medication 60 MCG/HR: at 23:19

## 2020-01-01 RX ADMIN — AZITHROMYCIN MONOHYDRATE 250 MG: 500 INJECTION, POWDER, LYOPHILIZED, FOR SOLUTION INTRAVENOUS at 08:52

## 2020-01-01 RX ADMIN — HYDROCORTISONE SODIUM SUCCINATE 50 MG: 100 INJECTION, POWDER, FOR SOLUTION INTRAMUSCULAR; INTRAVENOUS at 17:30

## 2020-01-01 RX ADMIN — HYDROXYCHLOROQUINE SULFATE 200 MG: 200 TABLET ORAL at 20:20

## 2020-01-01 RX ADMIN — HYDROCORTISONE SODIUM SUCCINATE 100 MG: 100 INJECTION, POWDER, FOR SOLUTION INTRAMUSCULAR; INTRAVENOUS at 14:16

## 2020-01-01 RX ADMIN — HYDROCORTISONE SODIUM SUCCINATE 50 MG: 100 INJECTION, POWDER, FOR SOLUTION INTRAMUSCULAR; INTRAVENOUS at 23:30

## 2020-01-01 RX ADMIN — Medication 10 MG/HR: at 09:01

## 2020-01-01 RX ADMIN — SODIUM CHLORIDE, PRESERVATIVE FREE 10 ML: 5 INJECTION INTRAVENOUS at 22:00

## 2020-01-01 RX ADMIN — POTASSIUM BICARBONATE 40 MEQ: 782 TABLET, EFFERVESCENT ORAL at 08:34

## 2020-01-01 RX ADMIN — HYDROCORTISONE SODIUM SUCCINATE 50 MG: 100 INJECTION, POWDER, FOR SOLUTION INTRAMUSCULAR; INTRAVENOUS at 13:33

## 2020-01-01 RX ADMIN — SODIUM CHLORIDE 1000 ML: 9 INJECTION, SOLUTION INTRAVENOUS at 03:29

## 2020-01-01 RX ADMIN — PROPOFOL INJECTABLE EMULSION 20 MCG/KG/MIN: 10 INJECTION, EMULSION INTRAVENOUS at 10:43

## 2020-01-01 RX ADMIN — HEPARIN SODIUM 1600 UNITS: 1000 INJECTION INTRAVENOUS; SUBCUTANEOUS at 21:00

## 2020-01-01 RX ADMIN — Medication 14 MCG/MIN: at 23:19

## 2020-01-01 RX ADMIN — POTASSIUM BICARBONATE 20 MEQ: 782 TABLET, EFFERVESCENT ORAL at 13:00

## 2020-01-01 RX ADMIN — ATORVASTATIN CALCIUM 20 MG: 20 TABLET, FILM COATED ORAL at 08:34

## 2020-01-01 RX ADMIN — INSULIN HUMAN 10 UNITS: 100 INJECTION, SOLUTION PARENTERAL at 09:30

## 2020-01-01 RX ADMIN — INSULIN HUMAN 10 UNITS: 100 INJECTION, SOLUTION PARENTERAL at 21:16

## 2020-01-01 RX ADMIN — HYDROXYCHLOROQUINE SULFATE 200 MG: 200 TABLET ORAL at 22:10

## 2020-01-01 RX ADMIN — SODIUM ZIRCONIUM CYCLOSILICATE 10 G: 5 POWDER, FOR SUSPENSION ORAL at 03:01

## 2020-01-01 RX ADMIN — FUROSEMIDE 40 MG: 10 INJECTION, SOLUTION INTRAMUSCULAR; INTRAVENOUS at 13:04

## 2020-01-01 RX ADMIN — Medication 81 MG: at 08:49

## 2020-01-01 RX ADMIN — POTASSIUM CHLORIDE 20 MEQ: 29.8 INJECTION, SOLUTION INTRAVENOUS at 20:23

## 2020-01-01 RX ADMIN — HEPARIN SODIUM 5000 UNITS: 5000 INJECTION INTRAVENOUS; SUBCUTANEOUS at 22:11

## 2020-01-01 RX ADMIN — Medication 60 MCG/HR: at 03:14

## 2020-01-01 RX ADMIN — HYDROXYCHLOROQUINE SULFATE 200 MG: 200 TABLET ORAL at 08:31

## 2020-01-01 RX ADMIN — HEPARIN SODIUM 5000 UNITS: 5000 INJECTION INTRAVENOUS; SUBCUTANEOUS at 15:31

## 2020-01-01 RX ADMIN — Medication 4 MG/HR: at 00:22

## 2020-01-01 RX ADMIN — SODIUM CHLORIDE, PRESERVATIVE FREE 10 ML: 5 INJECTION INTRAVENOUS at 08:35

## 2020-01-01 RX ADMIN — SODIUM CHLORIDE: 4.5 INJECTION, SOLUTION INTRAVENOUS at 10:03

## 2020-01-01 RX ADMIN — HYDROXYCHLOROQUINE SULFATE 200 MG: 200 TABLET ORAL at 22:34

## 2020-01-01 RX ADMIN — INSULIN LISPRO 1 UNITS: 100 INJECTION, SOLUTION INTRAVENOUS; SUBCUTANEOUS at 21:30

## 2020-01-01 RX ADMIN — HYDROCORTISONE SODIUM SUCCINATE 100 MG: 100 INJECTION, POWDER, FOR SOLUTION INTRAMUSCULAR; INTRAVENOUS at 13:39

## 2020-01-01 RX ADMIN — HEPARIN SODIUM 12 UNITS/KG/HR: 10000 INJECTION, SOLUTION INTRAVENOUS at 16:15

## 2020-01-01 RX ADMIN — ACETAMINOPHEN 650 MG: 325 TABLET ORAL at 03:38

## 2020-01-01 RX ADMIN — CLOPIDOGREL 75 MG: 75 TABLET, FILM COATED ORAL at 10:04

## 2020-01-01 RX ADMIN — POTASSIUM BICARBONATE 40 MEQ: 782 TABLET, EFFERVESCENT ORAL at 10:56

## 2020-01-01 RX ADMIN — Medication 81 MG: at 10:04

## 2020-01-01 RX ADMIN — HEPARIN SODIUM 1990 UNITS: 1000 INJECTION INTRAVENOUS; SUBCUTANEOUS at 05:18

## 2020-01-01 RX ADMIN — Medication 81 MG: at 12:10

## 2020-01-01 RX ADMIN — POTASSIUM CHLORIDE 20 MEQ: 40 SOLUTION ORAL at 22:12

## 2020-01-01 RX ADMIN — Medication 81 MG: at 09:01

## 2020-01-01 RX ADMIN — POTASSIUM CHLORIDE 20 MEQ: 400 INJECTION, SOLUTION INTRAVENOUS at 09:11

## 2020-01-01 RX ADMIN — DEXTROSE MONOHYDRATE 25 G: 25 INJECTION, SOLUTION INTRAVENOUS at 20:57

## 2020-01-01 RX ADMIN — SODIUM BICARBONATE: 84 INJECTION, SOLUTION INTRAVENOUS at 23:30

## 2020-01-01 RX ADMIN — METOPROLOL TARTRATE 5 MG: 1 INJECTION, SOLUTION INTRAVENOUS at 22:55

## 2020-01-01 RX ADMIN — POTASSIUM CHLORIDE 10 MEQ: 7.46 INJECTION, SOLUTION INTRAVENOUS at 06:39

## 2020-01-01 RX ADMIN — Medication 25 MCG/MIN: at 12:55

## 2020-01-01 RX ADMIN — SODIUM CHLORIDE: 9 INJECTION, SOLUTION INTRAVENOUS at 21:15

## 2020-01-01 RX ADMIN — SODIUM CHLORIDE: 9 INJECTION, SOLUTION INTRAVENOUS at 03:00

## 2020-01-01 RX ADMIN — FENTANYL CITRATE 100 MCG: 50 INJECTION, SOLUTION INTRAMUSCULAR; INTRAVENOUS at 11:28

## 2020-01-01 RX ADMIN — FAMOTIDINE 20 MG: 10 INJECTION, SOLUTION INTRAVENOUS at 08:40

## 2020-01-01 RX ADMIN — FAMOTIDINE 20 MG: 10 INJECTION, SOLUTION INTRAVENOUS at 08:45

## 2020-01-01 RX ADMIN — FLUDROCORTISONE ACETATE 0.1 MG: 0.1 TABLET ORAL at 13:28

## 2020-01-01 RX ADMIN — Medication 40 MCG/HR: at 10:28

## 2020-01-01 RX ADMIN — HYDROXYCHLOROQUINE SULFATE 200 MG: 200 TABLET ORAL at 10:04

## 2020-01-01 RX ADMIN — SODIUM ZIRCONIUM CYCLOSILICATE 10 G: 10 POWDER, FOR SUSPENSION ORAL at 20:22

## 2020-01-01 RX ADMIN — MIDAZOLAM HYDROCHLORIDE 2 MG: 1 INJECTION, SOLUTION INTRAMUSCULAR; INTRAVENOUS at 15:13

## 2020-01-01 RX ADMIN — HEPARIN SODIUM 5000 UNITS: 5000 INJECTION INTRAVENOUS; SUBCUTANEOUS at 05:36

## 2020-01-01 RX ADMIN — POTASSIUM CHLORIDE 20 MEQ: 400 INJECTION, SOLUTION INTRAVENOUS at 06:25

## 2020-01-01 RX ADMIN — SODIUM CHLORIDE, PRESERVATIVE FREE 10 ML: 5 INJECTION INTRAVENOUS at 20:21

## 2020-01-01 RX ADMIN — HEPARIN SODIUM 5000 UNITS: 5000 INJECTION INTRAVENOUS; SUBCUTANEOUS at 06:02

## 2020-01-01 RX ADMIN — ACETAMINOPHEN 650 MG: 325 TABLET ORAL at 15:18

## 2020-01-01 RX ADMIN — Medication 2 MG/HR: at 12:00

## 2020-01-01 RX ADMIN — SODIUM BICARBONATE: 84 INJECTION, SOLUTION INTRAVENOUS at 02:35

## 2020-01-01 RX ADMIN — HEPARIN SODIUM 5000 UNITS: 5000 INJECTION INTRAVENOUS; SUBCUTANEOUS at 05:52

## 2020-01-01 RX ADMIN — HYDROCORTISONE SODIUM SUCCINATE 50 MG: 100 INJECTION, POWDER, FOR SOLUTION INTRAMUSCULAR; INTRAVENOUS at 22:34

## 2020-01-01 RX ADMIN — HEPARIN SODIUM 5000 UNITS: 5000 INJECTION INTRAVENOUS; SUBCUTANEOUS at 13:30

## 2020-01-01 RX ADMIN — Medication 50 MEQ: at 12:43

## 2020-01-01 RX ADMIN — ATORVASTATIN CALCIUM 20 MG: 20 TABLET, FILM COATED ORAL at 08:31

## 2020-01-01 RX ADMIN — ASPIRIN 81 MG: 81 TABLET ORAL at 09:07

## 2020-01-01 RX ADMIN — ACETAMINOPHEN 650 MG: 325 TABLET ORAL at 09:11

## 2020-01-01 RX ADMIN — FUROSEMIDE 40 MG: 10 INJECTION, SOLUTION INTRAMUSCULAR; INTRAVENOUS at 08:40

## 2020-01-01 RX ADMIN — Medication 40 MCG/HR: at 06:35

## 2020-01-01 RX ADMIN — HYDROCORTISONE SODIUM SUCCINATE 50 MG: 100 INJECTION, POWDER, FOR SOLUTION INTRAMUSCULAR; INTRAVENOUS at 06:51

## 2020-01-01 RX ADMIN — SODIUM CHLORIDE, PRESERVATIVE FREE 10 ML: 5 INJECTION INTRAVENOUS at 11:29

## 2020-01-01 RX ADMIN — FLUDROCORTISONE ACETATE 0.1 MG: 0.1 TABLET ORAL at 11:48

## 2020-01-01 RX ADMIN — FAMOTIDINE 20 MG: 10 INJECTION, SOLUTION INTRAVENOUS at 09:12

## 2020-01-01 RX ADMIN — HEPARIN SODIUM 5000 UNITS: 5000 INJECTION INTRAVENOUS; SUBCUTANEOUS at 06:50

## 2020-01-01 ASSESSMENT — PAIN SCALES - GENERAL
PAINLEVEL_OUTOF10: 0
PAINLEVEL_OUTOF10: 10
PAINLEVEL_OUTOF10: 0

## 2020-01-01 ASSESSMENT — PULMONARY FUNCTION TESTS
PIF_VALUE: 26
PIF_VALUE: 30
PIF_VALUE: 10
PIF_VALUE: 23
PIF_VALUE: 26
PIF_VALUE: 28
PIF_VALUE: 29
PIF_VALUE: 26
PIF_VALUE: 30
PIF_VALUE: 25
PIF_VALUE: 21
PIF_VALUE: 28
PIF_VALUE: 23
PIF_VALUE: 16
PIF_VALUE: 29
PIF_VALUE: 26
PIF_VALUE: 30
PIF_VALUE: 17
PIF_VALUE: 25
PIF_VALUE: 18
PIF_VALUE: 30
PIF_VALUE: 30
PIF_VALUE: 24
PIF_VALUE: 24
PIF_VALUE: 23
PIF_VALUE: 29
PIF_VALUE: 30
PIF_VALUE: 27
PIF_VALUE: 30
PIF_VALUE: 30
PIF_VALUE: 27
PIF_VALUE: 26
PIF_VALUE: 33
PIF_VALUE: 26
PIF_VALUE: 24
PIF_VALUE: 27
PIF_VALUE: 29
PIF_VALUE: 31
PIF_VALUE: 24
PIF_VALUE: 24
PIF_VALUE: 27
PIF_VALUE: 25
PIF_VALUE: 27
PIF_VALUE: 30
PIF_VALUE: 23
PIF_VALUE: 30
PIF_VALUE: 25
PIF_VALUE: 30
PIF_VALUE: 27
PIF_VALUE: 27
PIF_VALUE: 26
PIF_VALUE: 25
PIF_VALUE: 23
PIF_VALUE: 16
PIF_VALUE: 20
PIF_VALUE: 25
PIF_VALUE: 25
PIF_VALUE: 26
PIF_VALUE: 27
PIF_VALUE: 29
PIF_VALUE: 24
PIF_VALUE: 30
PIF_VALUE: 29
PIF_VALUE: 25
PIF_VALUE: 29
PIF_VALUE: 28
PIF_VALUE: 30
PIF_VALUE: 30
PIF_VALUE: 28
PIF_VALUE: 27
PIF_VALUE: 30
PIF_VALUE: 30
PIF_VALUE: 26
PIF_VALUE: 30
PIF_VALUE: 30
PIF_VALUE: 29
PIF_VALUE: 27
PIF_VALUE: 24
PIF_VALUE: 24
PIF_VALUE: 22
PIF_VALUE: 30
PIF_VALUE: 29
PIF_VALUE: 29
PIF_VALUE: 30
PIF_VALUE: 28
PIF_VALUE: 27
PIF_VALUE: 26
PIF_VALUE: 27
PIF_VALUE: 30
PIF_VALUE: 30
PIF_VALUE: 27
PIF_VALUE: 12
PIF_VALUE: 19
PIF_VALUE: 29
PIF_VALUE: 27
PIF_VALUE: 30
PIF_VALUE: 28
PIF_VALUE: 21
PIF_VALUE: 30
PIF_VALUE: 30
PIF_VALUE: 26
PIF_VALUE: 27
PIF_VALUE: 26
PIF_VALUE: 28
PIF_VALUE: 29
PIF_VALUE: 18
PIF_VALUE: 29
PIF_VALUE: 22
PIF_VALUE: 24
PIF_VALUE: 27
PIF_VALUE: 28
PIF_VALUE: 28
PIF_VALUE: 27
PIF_VALUE: 24
PIF_VALUE: 25
PIF_VALUE: 29
PIF_VALUE: 26
PIF_VALUE: 29
PIF_VALUE: 29
PIF_VALUE: 24
PIF_VALUE: 28
PIF_VALUE: 23
PIF_VALUE: 25
PIF_VALUE: 30
PIF_VALUE: 24
PIF_VALUE: 29
PIF_VALUE: 24
PIF_VALUE: 27
PIF_VALUE: 25
PIF_VALUE: 27
PIF_VALUE: 25
PIF_VALUE: 30
PIF_VALUE: 30
PIF_VALUE: 23
PIF_VALUE: 30
PIF_VALUE: 29
PIF_VALUE: 20
PIF_VALUE: 29
PIF_VALUE: 24
PIF_VALUE: 30
PIF_VALUE: 22
PIF_VALUE: 30
PIF_VALUE: 24
PIF_VALUE: 30
PIF_VALUE: 30
PIF_VALUE: 25
PIF_VALUE: 22
PIF_VALUE: 26
PIF_VALUE: 24
PIF_VALUE: 30
PIF_VALUE: 30
PIF_VALUE: 18
PIF_VALUE: 25
PIF_VALUE: 30
PIF_VALUE: 29
PIF_VALUE: 26
PIF_VALUE: 30
PIF_VALUE: 30
PIF_VALUE: 27
PIF_VALUE: 29
PIF_VALUE: 27
PIF_VALUE: 27
PIF_VALUE: 30
PIF_VALUE: 29

## 2020-01-01 ASSESSMENT — PAIN SCALES - WONG BAKER
WONGBAKER_NUMERICALRESPONSE: 0

## 2020-01-01 ASSESSMENT — ENCOUNTER SYMPTOMS
BACK PAIN: 1
COUGH: 1
DIARRHEA: 0
SHORTNESS OF BREATH: 1
NAUSEA: 1
VOMITING: 0
ABDOMINAL PAIN: 0
CONSTIPATION: 0
VOMITING: 0
COLOR CHANGE: 0
NAUSEA: 0

## 2020-04-08 PROBLEM — R06.03 RESPIRATORY DISTRESS: Status: ACTIVE | Noted: 2020-01-01

## 2020-04-08 NOTE — ED PROVIDER NOTES
(N/A, 4/26/2018); Upper gastrointestinal endoscopy (N/A, 4/26/2018); Fixation Kyphoplasty (08/27/2018); and pr office/outpt visit,procedure only (N/A, 8/27/2018). Social History     Socioeconomic History    Marital status:      Spouse name: Not on file    Number of children: Not on file    Years of education: Not on file    Highest education level: Not on file   Occupational History    Not on file   Social Needs    Financial resource strain: Not on file    Food insecurity     Worry: Not on file     Inability: Not on file    Transportation needs     Medical: Not on file     Non-medical: Not on file   Tobacco Use    Smoking status: Never Smoker    Smokeless tobacco: Never Used   Substance and Sexual Activity    Alcohol use: No    Drug use: Not on file    Sexual activity: Not on file   Lifestyle    Physical activity     Days per week: Not on file     Minutes per session: Not on file    Stress: Not on file   Relationships    Social connections     Talks on phone: Not on file     Gets together: Not on file     Attends Gnosticist service: Not on file     Active member of club or organization: Not on file     Attends meetings of clubs or organizations: Not on file     Relationship status: Not on file    Intimate partner violence     Fear of current or ex partner: Not on file     Emotionally abused: Not on file     Physically abused: Not on file     Forced sexual activity: Not on file   Other Topics Concern    Not on file   Social History Narrative    Not on file       Family History   Problem Relation Age of Onset    Asthma Daughter     Diabetes Son     Heart Attack Mother         PAD/PVD    Prostate Cancer Brother        Allergies:  Adhesive tape; Benadryl [diphenhydramine]; Shellfish-derived products; Atarax [hydroxyzine]; Bactrim [sulfamethoxazole-trimethoprim]; Codeine; Dicyclomine;  Hydroxyzine hcl; Other; Penicillins; Pregabalin; and Sulfa antibiotics    Home Medications:  Prior to Admission medications    Medication Sig Start Date End Date Taking? Authorizing Provider   aspirin 81 MG EC tablet Take 81 mg by mouth daily   Yes Historical Provider, MD   ondansetron (ZOFRAN) 4 MG tablet Take 4 mg by mouth every 8 hours as needed for Nausea or Vomiting   Yes Historical Provider, MD   predniSONE (DELTASONE) 10 MG tablet Take 10 mg by mouth daily   Yes Historical Provider, MD   Denosumab (PROLIA SC) Inject into the skin Due next week   Yes Historical Provider, MD   calcium carbonate (TUMS) 500 MG chewable tablet Take 2 tablets by mouth daily   Yes Historical Provider, MD   benzonatate (TESSALON) 100 MG capsule Take 100 mg by mouth 3 times daily as needed for Cough   Yes Historical Provider, MD   gabapentin (NEURONTIN) 300 MG capsule 3 times daily. 8/2/19  Yes Historical Provider, MD   imipramine (TOFRANIL) 25 MG tablet Take 25 mg by mouth daily  9/20/19  Yes Historical Provider, MD   IRON PO Take 325 mg by mouth daily    Yes Historical Provider, MD   CRANBERRY PO Take by mouth   Yes Historical Provider, MD   folic acid (FOLVITE) 1 MG tablet Take 1 tablet by mouth daily 10/22/18  Yes Ankit Henriquez MD   amLODIPine (NORVASC) 5 MG tablet Take 5 mg by mouth daily   Yes Historical Provider, MD   meloxicam (MOBIC) 7.5 MG tablet Take 7.5 mg by mouth daily   Yes Historical Provider, MD   HYDROcodone-acetaminophen (NORCO) 5-325 MG per tablet Take 1 tablet by mouth every 6 hours as needed for Pain. .   Yes Historical Provider, MD   pantoprazole (PROTONIX) 40 MG tablet Take 40 mg by mouth daily  9/11/17  Yes Historical Provider, MD   atorvastatin (LIPITOR) 20 MG tablet Take 20 mg by mouth   Yes Historical Provider, MD   clopidogrel (PLAVIX) 75 MG tablet Take 75 mg by mouth   Yes Historical Provider, MD   gemfibrozil (LOPID) 600 MG tablet Take 600 mg by mouth 2 times daily    Yes Historical Provider, MD   linaclotide (LINZESS) 145 MCG capsule Take 1 capsule by mouth every morning (before breakfast)  Patient Procedures    Culture, Blood 1    Culture, Blood 1    Culture, Urine    XR CHEST PORTABLE    Ammonia    CBC Auto Differential    Comprehensive Metabolic Panel    Protime-INR    Lactate, Sepsis    APTT    Urinalysis with Microscopic    Troponin    Troponin    C-REACTIVE PROTEIN    FERRITIN    LACTATE DEHYDROGENASE    Brain Natriuretic Peptide    Procalcitonin    Telemetry Monitoring    Inpatient consult to Critical Care    POC Blood Gas    EKG 12 Lead    PATIENT STATUS (FROM ED OR OR/PROCEDURAL) Inpatient       MEDICATIONS ORDERED:  Orders Placed This Encounter   Medications    acetaminophen (TYLENOL) tablet 650 mg    0.9 % sodium chloride bolus    levofloxacin (LEVAQUIN) 750 MG/150ML infusion 750 mg         Initial MDM/Plan: 82 y.o. female who presents with difficulty breathing. The patient arrived hypoxic to the 40s with improvement with nonrebreather mask. She was tachycardic to low 100s. She was unable to complete full sentences. Her abdomen was soft, non-tender and non-distended. She had diffuse bruising arms and lungs. Plan for septic workup. Given the patient's exposure to COVID, high suspicion of infection. Will get cardiac workup.     DIAGNOSTIC RESULTS / EMERGENCYDEPARTMENT COURSE / MDM     LABS:  Labs Reviewed   CBC WITH AUTO DIFFERENTIAL - Abnormal; Notable for the following components:       Result Value    RBC 3.86 (*)     Seg Neutrophils 92 (*)     Lymphocytes 2 (*)     Eosinophils % 0 (*)     Absolute Lymph # 0.17 (*)     All other components within normal limits   COMPREHENSIVE METABOLIC PANEL - Abnormal; Notable for the following components:    BUN 38 (*)     CREATININE 1.83 (*)     Sodium 132 (*)     CO2 11 (*)     Anion Gap 20 (*)     AST 71 (*)     Albumin/Globulin Ratio 0.8 (*)     GFR Non- 26 (*)     GFR  32 (*)     All other components within normal limits   LACTATE, SEPSIS - Abnormal; Notable for the following components:    Lactic patient's chest x-ray is concerning for multifocal pneumonia. The patient's saturation in the high 80s patient on nonrebreather mask and she is intermittently tachypneic. She was given 1 L fluid bolus with improvement in her heart rate. Plan to start the patient on levofloxacin due to penicillin allergy. Plan to meet the patient to code ICU at this time. Spoke to critical care resident who recommended VBG and accepted the patient. Will hold off on intubation at this time due to improvement in initial oxygen saturations. VBG and repeat troponin are pending. PROCEDURES:  None    CONSULTS:  IP CONSULT TO CRITICAL CARE    CRITICAL CARE:  Please see attending note    FINAL IMPRESSION      1.  Pneumonia due to organism          DISPOSITION / PLAN     DISPOSITION        PATIENT REFERRED TO:  MD NORA [facility] [place] [address] [address] [phone]            DISCHARGE MEDICATIONS:  New Prescriptions    No medications on file       Mango Grant MD  Emergency Medicine Resident    (Please note that portions of this note were completed with a voice recognition program.Efforts were made to edit the dictations but occasionally words are mis-transcribed.)        Mango Grant MD  Resident  04/08/20 0600

## 2020-04-08 NOTE — H&P
Per cardiology notes patient is on aspirin and Plavix because of this reason. No history of coronary artery disease. History of 3.3 cm abdominal aortic aneurysm. Has TTE done in 2018 which showed EF of 60%, mild AR and mild TR  She has history of bradycardia. History of compression fractures of vertebra. Patient is on Norco and Mobic at home  H/o of hypertension and is on Norvasc 5 mg. Was intubated in the unit. Was sedated with propofol. Patient blood pressure was 160. Fentanyl drip was started. After Intubation blood gas showed 7.03/50 2. 1/1 30.4/13. 8. And is on PRVC/RR30//PEEP10/Kjo213          Diagnosis Date    Anemia     Arteriosclerosis     Arthritis     Atherosclerotic ulcer of aorta (HCC)     Bradycardia     Compression fracture of body of thoracic vertebra (HCC)     Constipation     Fibromyalgia     GERD (gastroesophageal reflux disease)     Hyperlipidemia     Osteoarthritis     Osteoporosis     Palpitations     Polymyalgia (HCC)        Past Surgical History:        Procedure Laterality Date    CATARACT REMOVAL      CHOLECYSTECTOMY      FIXATION KYPHOPLASTY      FIXATION KYPHOPLASTY  08/27/2018    T8,T11, L2-L4    FOOT SURGERY Bilateral     HAMMER TOE SURGERY Bilateral     PARTIAL HYSTERECTOMY      [place] COL[place]COPY W/BIOPSY SINGLE/MULTIPLE N/A 4/26/2018    COLONOSCOPY WITH BIOPSY performed by Carlos Camacho MD at [facility]    [place] [facility]ICE[facility]PT [facility] N/A 8/27/2018    KYPHOPLASTY,T8,T11,L2-L4, performed by Viet Garcia MD at [address] [place] [address] Right     TONSILLECTOMY      TOTAL KNEE ARTHROPLASTY Right     total right knee replacement     UPPER GASTROINTESTINAL ENDOSCOPY      UPPER GASTROINTESTINAL ENDOSCOPY N/A 4/26/2018    EGD DILATION SAVORY performed by Carlos Camacho MD at [facility]       Allergies: Allergies   Allergen Reactions    Adhesive Tape Other (See Comments)     Tears pt.  Skin/blisters    Benadryl [Diphenhydramine]      Pt became confused and combative. IV benadryl, not oral    Shellfish-Derived Products Anaphylaxis     Difficulty breathing    Atarax [Hydroxyzine]     Bactrim [Sulfamethoxazole-Trimethoprim]     Codeine      Pt. States she has no allergy to this drug    Dicyclomine      Pt. States she is not allergic to this drug    Hydroxyzine Hcl      Pt. States she is not allergic to this drug    Other      Other reaction(s): Unknown    Penicillins     Pregabalin     Sulfa Antibiotics          Home Meds:   Prior to Admission medications    Medication Sig Start Date End Date Taking? Authorizing Provider   aspirin 81 MG EC tablet Take 81 mg by mouth daily   Yes Historical Provider, MD   ondansetron (ZOFRAN) 4 MG tablet Take 4 mg by mouth every 8 hours as needed for Nausea or Vomiting   Yes Historical Provider, MD   predniSONE (DELTASONE) 10 MG tablet Take 10 mg by mouth daily   Yes Historical Provider, MD   Denosumab (PROLIA SC) Inject into the skin Due next week   Yes Historical Provider, MD   calcium carbonate (TUMS) 500 MG chewable tablet Take 2 tablets by mouth daily   Yes Historical Provider, MD   benzonatate (TESSALON) 100 MG capsule Take 100 mg by mouth 3 times daily as needed for Cough   Yes Historical Provider, MD   gabapentin (NEURONTIN) 300 MG capsule 3 times daily.   8/2/19  Yes Historical Provider, MD   imipramine (TOFRANIL) 25 MG tablet Take 25 mg by mouth daily  9/20/19  Yes Historical Provider, MD   IRON PO Take 325 mg by mouth daily    Yes Historical Provider, MD   CRANBERRY PO Take by mouth   Yes Historical Provider, MD   folic acid (FOLVITE) 1 MG tablet Take 1 tablet by mouth daily 10/22/18  Yes Martha Meier MD   amLODIPine (NORVASC) 5 MG tablet Take 5 mg by mouth daily   Yes Historical Provider, MD   meloxicam (MOBIC) 7.5 MG tablet Take 7.5 mg by mouth daily   Yes Historical Provider, MD   HYDROcodone-acetaminophen (NORCO) 5-325 MG per tablet Take 1 tablet by mouth every 6 hours as needed for Pain. .   Yes Historical Provider, MD   pantoprazole (PROTONIX) 40 MG tablet Take 40 mg by mouth daily  9/11/17  Yes Historical Provider, MD   atorvastatin (LIPITOR) 20 MG tablet Take 20 mg by mouth   Yes Historical Provider, MD   clopidogrel (PLAVIX) 75 MG tablet Take 75 mg by mouth   Yes Historical Provider, MD   gemfibrozil (LOPID) 600 MG tablet Take 600 mg by mouth 2 times daily    Yes Historical Provider, MD   linaclotide (LINZESS) 145 MCG capsule Take 1 capsule by mouth every morning (before breakfast)  Patient not taking: Reported on 1/2/2020 10/22/18   Za Wood MD   vitamin B-12 100 MCG tablet Take 1 tablet by mouth daily 10/22/18   Za Wood MD   ranitidine (ZANTAC) 150 MG tablet Take 150 mg by mouth    Historical Provider, MD       Social History:   TOBACCO:   reports that she has never smoked. She has never used smokeless tobacco.  ETOH:   reports no history of alcohol use. DRUGS:  has no history on file for drug. OCCUPATION:      Family History:       Problem Relation Age of Onset    Asthma Daughter     Diabetes Son     Heart Attack Mother         PAD/PVD    Prostate Cancer Brother            REVIEW OF SYSTEMS (ROS):  Review of Systems -   · Unable to perform as patient is intubated and sedated     Physical Exam:    Vitals: BP (!) 102/56   Pulse 75   Temp 97.7 °F (36.5 °C) (Axillary)   Resp (!) 32   Ht 5' (1.524 m)   Wt 146 lb (66.2 kg)   SpO2 90%   BMI 28.51 kg/m²     Last Body weight:   Wt Readings from Last 3 Encounters:   04/08/20 146 lb (66.2 kg)   01/02/20 140 lb (63.5 kg)   10/20/19 145 lb (65.8 kg)       Body Mass Index : Body mass index is 28.51 kg/m². PHYSICAL EXAMINATION :  · General appearance:Intubated and sedated   · HEENT: Head: Normocephalic, no lesions, without obvious abnormality.   · Lungs: clear to auscultation bilaterally  · Heart: regular rate and rhythm, S1, S2 normal, no murmur  · Abdomen: soft, non-tender; bowel sounds normal; likely secondary to underlying COVID infection.   -Is intubated and sedated with propofol and fentanyl.  -1 dose of 750 mg of Levaquin. Infectious disease consulted. Will start hydroxychloroquine today. Will ask ID for Azithromycin dose for today. qTC normal    -7.03/50 2. 1/1 30.4/13. 8. And is on PRVC/RR30//PEEP10/Ync475  - Follow CRP, Ferritin  - Respiratory viral panel, mycoplasma, legionella and strep antigen    2. H/o polymyalgia rheumatica and giant cell arteritis: On prednisone 10 mg daily. Will start Solu-cortef. 3. FRANSISCA: Cr 1/83. Baseline creatinine normal. Nephrology consulted. Monitor urine OP.     4 .H/o A Aortic Penetrating ulcer with mobile atheroma: On ASA and Plavix as per cardiology     5. H/o TIA          Esvin New M.D.  PGY -2  Department of Internal Medicine/ Critical care  [facility] ([place]             4/8/2020, 1:16 PM    Attending Physician Statement  I have discussed the care of Chip Brown, including pertinent history and exam findings with the resident. I have reviewed the key elements of all parts of the encounter with the resident. I have seen and examined the patient with the resident. I agree with the assessment and plan and status of the problem list as documented. Please see full note by critical care resident     She is a history of multiple medical problems history of atherosclerotic aortic disease hypertension history of temporal arteritis and polymyalgia rheumatica according to chart on chronic steroid therapy apparently functional status is limited presented with 1 week history of fever cough and shortness of breath according to emergency no nausea and vomiting, according to available history she had exposure to COVID 19, she is on chronic steroids and on aspirin Plavix for thrombus in the aorta.   Her initial labs shows hyponatremia FRANSISCA with creatinine of 1.83 bicarbonate of 11 procalcitonin 1.09, ,  troponin 43 and 52 critical care time caring for this patient with life threatening, unstable organ failure, including direct patient contact, management of life support systems, review of data including imaging and labs, discussions with other team members and physicians at least 61  Min so far today, excluding procedures.        Please note that this chart was generated using voice recognition Dragon dictation software.  Although every effort was made to ensure the accuracy of this automated transcription, some errors in transcription may have occurred.  Gerald Ling MD  4/8/2020 12:27 PM

## 2020-04-08 NOTE — CONSULTS
history, and family history, and I have updated the database accordingly.   Past Medical History:      Past Medical History        Past Medical History:   Diagnosis Date    Anemia      Arteriosclerosis      Arthritis      Atherosclerotic ulcer of aorta (HCC)      Bradycardia      Compression fracture of body of thoracic vertebra (HCC)      Constipation      Fibromyalgia      GERD (gastroesophageal reflux disease)      Hyperlipidemia      Osteoarthritis      Osteoporosis      Palpitations      Polymyalgia (HCC)              Past Surgical  History:      Past Surgical History         Past Surgical History:   Procedure Laterality Date    CATARACT REMOVAL        CHOLECYSTECTOMY        FIXATION KYPHOPLASTY        FIXATION KYPHOPLASTY   08/27/2018     T8,T11, L2-L4    FOOT SURGERY Bilateral      HAMMER TOE SURGERY Bilateral      PARTIAL HYSTERECTOMY        [place] COLONOSCOPY W/BIOPSY SINGLE/MULTIPLE N/A 4/26/2018     COLONOSCOPY WITH BIOPSY performed by Marii Kiran MD at [facility]    IN OFFICE/OUTPT VISIT,PROCEDURE ONLY N/A 8/27/2018     KYPHOPLASTY,T8,T11,L2-L4, performed by Lul Edmonds MD at [address] [place] [address] Right      TONSILLECTOMY        TOTAL KNEE ARTHROPLASTY Right       total right knee replacement     UPPER GASTROINTESTINAL ENDOSCOPY        UPPER GASTROINTESTINAL ENDOSCOPY N/A 4/26/2018     EGD DILATION SAVORY performed by Marii Kiran MD at [facility]            Medications:      Scheduled Medications    sodium chloride flush  10 mL Intravenous 2 times per day    heparin (porcine)  5,000 Units Subcutaneous 3 times per day    aspirin  81 mg Oral Daily    atorvastatin  20 mg Oral Daily    hydroxychloroquine  400 mg Oral BID    [START ON 4/9/2020] hydroxychloroquine  200 mg Oral BID    hydrocortisone sodium succinate PF  50 mg Intravenous Q8H            Social History:      Social History               Socioeconomic History    Marital status: Osteopenia.           Impression   Multifocal bilateral heterogeneous pulmonary opacities with consolidation in   the right mid and lower lung zones.  Differential considerations include   multifocal pneumonia including atypical or viral pneumonia and asymmetric   pulmonary edema.       Cardiomegaly.         Medical Decision Lfhgab-Uatdeqra-Sjitu:         Medical Decision Making-Other:      Note:  · Labs, medications, radiologic studies were reviewed with personal review of films  · Moderate Large amounts of data were reviewed  · Discussed with nursing Staff, Discharge planner  · Infection Control and Prevention measures reviewed  · All prior entries were reviewed  · Administer medications as ordered  · Prognosis: Guarded  · Discharge planning reviewed  · Follow up as outpatient.     Thank you for allowing us to participate in the care of this patient.  Please call with questions.     Nabeel Juarez MD  Pager: [phone] - Office: [phone]

## 2020-04-08 NOTE — DISCHARGE INSTR - COC
Continuity of Care Form    Patient Name: Flaquita Banegas   :  1935  MRN:  [id number]    Admit date:  2020  Discharge date:  ***    Code Status Order: Prior   Advance Directives:     Admitting Physician:  No admitting provider for patient encounter. PCP: Paola Ortiz MD    Discharging Nurse: [facility] Unit/Room#:   Discharging Unit Phone Number: ***    Emergency Contact:   Extended Emergency Contact Information  Primary Emergency Contact: Alla Sahu  Address: [address] [place] [address], [address] Phone: [phone]  Relation: Child    Past Surgical History:  Past Surgical History:   Procedure Laterality Date    CATARACT REMOVAL      CHOLECYSTECTOMY      FIXATION KYPHOPLASTY      FIXATION KYPHOPLASTY  2018    T8,T11, L2-L4    FOOT SURGERY Bilateral     HAMMER TOE SURGERY Bilateral     PARTIAL HYSTERECTOMY      [place] COLONOSCOPY W/BIOPSY SINGLE/MULTIPLE N/A 2018    COLONOSCOPY WITH BIOPSY performed by Suzanne Nicole MD at [facility]    [place] OFFICE/OUTPT [facility] N/A 2018    KYPHOPLASTY,T8,T11,L2-L4, performed by Mary Velásquez MD at [facility] [address] Right    [address] Right     total right knee replacement     UPPER GASTROINTESTINAL ENDOSCOPY      UPPER GASTROINTESTINAL ENDOSCOPY N/A 2018    EGD DILATION SAVORY performed by Suzanne Nicole MD at [facility]       Immunization History: There is no immunization history on file for this patient.     Active Problems:  Patient Active Problem List   Diagnosis Code    Compression fracture of T12 vertebra ([facility] [address] S22.080A    Atherosclerotic ulcer of aorta (HCC) I70.0    Compression fracture of lumbar vertebra, closed, initial encounter ([facility] S32.000A    GERD (gastroesophageal reflux disease) K21.9    Osteoarthritis M19.90    Spinal stenosis of lumbar region M48.061    Pathologic compression fracture of spine (HCC) M48.50XA    Generalized weakness R53.1    Acute cystitis N30.00    Anemia D64.9    Dyspnea on exertion R06.09    Vitamin B12 deficiency E53.8       Isolation/Infection:   Isolation          No Isolation        Patient Infection Status     None to display          Nurse Assessment:  Last Vital Signs: BP (!) 111/49   Pulse 87   Temp 97.5 °F (36.4 °C) (Oral)   Resp (!) 32   Ht 5' (1.524 m)   Wt 146 lb (66.2 kg)   SpO2 (!) 89%   BMI 28.51 kg/m²     Last documented pain score (0-10 scale): Pain Level: 0  Last Weight:   Wt Readings from Last 1 Encounters:   04/08/20 146 lb (66.2 kg)     Mental Status:  {IP PT MENTAL STATUS:20030}    IV Access:  { MARIA GUADALUPE IV ACCESS:909098600}    Nursing Mobility/ADLs:  Walking   {CHP DME KOQE:452991329}  Transfer  {CHP DME TFEU:711408734}  Bathing  {CHP DME UOUC:982523073}  Dressing  {CHP DME FBGQ:110959043}  Toileting  {CHP DME VOLD:372230376}  Feeding  {CHP DME FEWC:372307632}  Med Admin  {CHP DME PHQH:539152247}  Med Delivery   { MARIA GUADALUPE MED Delivery:325966488}    Wound Care Documentation and Therapy:        Elimination:  Continence:   · Bowel: {YES / YV:70485}  · Bladder: {YES / OQ:48282}  Urinary Catheter: {Urinary Catheter:178757208}   Colostomy/Ileostomy/Ileal Conduit: {YES / IB:42657}       Date of Last BM: ***  No intake or output data in the 24 hours ending 04/08/20 0513  No intake/output data recorded.     Safety Concerns:     [address] [employer] Safety Concerns:013489614}    Impairments/Disabilities:      [address] [employer] Impairments/Disabilities:001132191}    Nutrition Therapy:  Current Nutrition Therapy:   [address] [employer] Diet List:946979009}    Routes of Feeding: {CHP DME Other Feedings:813872606}  Liquids: {Slp liquid thickness:46294}  Daily Fluid Restriction: {CHP DME Yes amt example:028271548}  Last Modified Barium Swallow with Video (Video Swallowing Test): {Done Not Done LXAV:668773863}    Treatments at the Time of Hospital Discharge:   Respiratory Treatments:

## 2020-04-08 NOTE — ANESTHESIA PROCEDURE NOTES
Airway  Urgency: urgent    Airway not difficult    General Information and Staff    Patient location during procedure: ICU  Resident/CRNA: MAY Greer - CRNA  Performed: resident/CRNA     Consent for Airway (if performed for an anesthetic, see related documentation for consents)  Patient identity confirmed: per hospital policy  Consent: The procedure was performed in an emergent situation. Verbal consent not obtained. Written consent not obtained.   Risks and benefits: risks, benefits and alternatives were not discussed      Code status verified:yes  Indications and Patient Condition  Indications for airway management: respiratory failure  Spontaneous ventilation: present  Preoxygenated: yes  Patient position: sniffing  Mask difficulty assessment: not attempted    Final Airway Details  Final airway type: endotracheal airway      Successful airway: ETT  Cuffed: yes   Successful intubation technique: video laryngoscopy  Facilitating devices/methods: intubating stylet  Endotracheal tube insertion site: oral  Blade: Dexter  Blade size: #3  ETT size (mm): 8.0  Cormack-Lehane Classification: grade I - full view of glottis  Placement verified by: chest auscultation and capnometry   Measured from: teeth  ETT to teeth (cm): 21  Number of attempts at approach: 1  Ventilation between attempts: bag mask  Number of other approaches attempted: 0    Additional Comments  RSI Etomidate 20 mg Rocuronium 50 mg,  no

## 2020-04-08 NOTE — ANESTHESIA PROCEDURE NOTES
Arterial Line:    An arterial line was placed using ultrasound guidance and surface landmarks, in the ICU for the following indication(s): continuous blood pressure monitoring and blood sampling needed. A 20 gauge (size), 1 and 3/4 inch (length), Arrow (type) catheter was placed, Seldinger technique used, into the right radial artery, secured by Tegaderm, tape and suture. Anesthesia type: General    Events:  patient tolerated procedure well with no complications.   Resident/CRNA: Bonnie Pod, APRN - CRNA  Performed: Resident/CRNA   Preanesthetic Checklist  Completed: patient identified, site marked, timeout performed, IV checked, monitors and equipment checked, oxygen available and patient being monitored

## 2020-04-08 NOTE — ED NOTES
Bed: 19  Expected date: 4/8/20  Expected time: 2:46 AM  Means of arrival: [facility]  Comments:  NERI Petersen RN  04/08/20 0520

## 2020-04-08 NOTE — ED PROVIDER NOTES
[facility]     Emergency Department     Faculty Attestation    I performed a history and physical examination of the patient and discussed management with the resident. I have reviewed and agree with the residents findings including all diagnostic interpretations, and treatment plans as written. Any areas of disagreement are noted on the chart. I was personally present for the key portions of any procedures. I have documented in the chart those procedures where I was not present during the key portions. I have reviewed the emergency nurses triage note. I agree with the chief complaint, past medical history, past surgical history, allergies, medications, social and family history as documented unless otherwise noted below. Documentation of the HPI, Physical Exam and Medical Decision Making performed by scribjasen is based on my personal performance of the HPI, PE and MDM. For Physician Assistant/ Nurse Practitioner cases/documentation I have personally evaluated this patient and have completed at least one if not all key elements of the E/M (history, physical exam, and MDM). Additional findings are as noted. 83 yo F fever, sob cough, covid suspect, ill contacts,   pe vss sat 98% with O2 for respiratory, gcs 15, neck supple,     Admit, abx started, pt mentating well, pH 7.36, CO2 22,     EKG Interpretation    Interpreted by me  Normal sinus rhythm, heart rate 89, no ST elevation, left axis deviation, LVH, QT corrected 425    CRITICAL CARE: There was a high probability of clinically significant/life threatening deterioration in this patient's condition which required my urgent intervention. Total critical care time was 35 minutes. This excludes any time for separately reportable procedures.        [place], DO  04/08/20 [facility], DO  04/08/20 Jolly 1229, DO  04/08/20 6826       Sumanth Laguerre,

## 2020-04-09 PROBLEM — U07.1 PNEUMONIA DUE TO SEVERE ACUTE RESPIRATORY SYNDROME CORONAVIRUS 2 (SARS-COV-2): Status: ACTIVE | Noted: 2020-01-01

## 2020-04-09 PROBLEM — J12.82 PNEUMONIA DUE TO SEVERE ACUTE RESPIRATORY SYNDROME CORONAVIRUS 2 (SARS-COV-2): Status: ACTIVE | Noted: 2020-01-01

## 2020-04-09 NOTE — CARE COORDINATION
Care Transition  Patient is covid 19 positive. She lives with daughter, has walker and wheelchair. Remains intubated and sedated.

## 2020-04-09 NOTE — PROGRESS NOTES
at 4/9/2020 1126  Last data filed at 4/9/2020 0600  Gross per 24 hour   Intake 1051.93 ml   Output 609 ml   Net 442.93 ml     Patient Vitals for the past 96 hrs (Last 3 readings):   Weight   04/09/20 0600 146 lb 3.2 oz (66.3 kg)   04/08/20 0322 146 lb (66.2 kg)     Physical Exam:  General appearance: Debated and sedated.   Patient was grimacing on physical exam touch  Skin: Warm to touch  Eyes: Conjunctiva was pink and the sclera was reactive to light  ENT: :no thrush no pharyngeal congestion    Neck: No carotid bruit or thyromegaly  Pulmonary bilateral air entry and no wheezes  Cardiovascular: Normal S1 & S2,  No S3 or  S4, no Pericardial Rub no Murmur   Abdomen: Soft and nontender  Extremities: Trace edema    Labs:   CBC:  Recent Labs     04/08/20 0349 04/09/20  0657   WBC 8.3 8.4   RBC 3.86* 2.77*   HGB 12.5 8.8*   HCT 36.7 26.7*   MCV 95.1 96.4   MCH 32.4 31.8   MCHC 34.1 33.0   RDW 13.3 13.6    143   MPV 10.6 10.5      BMP:   Recent Labs     04/08/20  0349 04/08/20  0553 04/08/20  1900 04/09/20  0657   *  --  133* 138   K 4.8  --  5.0 4.2     --  106 105   CO2 11*  --  10* 18*   BUN 38*  --  43* 43*   CREATININE 1.83* 1.74* 1.83* 1.75*   GLUCOSE 95  --  121* 138*   CALCIUM 9.3  --  8.1* 8.0*      Albumin:   Recent Labs     04/08/20  0349 04/09/20  0657   LABALBU 3.7 2.4*       IRON:    Lab Results   Component Value Date    IRON 48 10/20/2018     Iron Saturation:  No components found for: PERCENTFE  TIBC:    Lab Results   Component Value Date    TIBC 307 10/20/2018     FERRITIN:    Lab Results   Component Value Date    FERRITIN 1,484 04/08/2020     SPEP:   Lab Results   Component Value Date    PROT 6.0 04/09/2020   Urinalysis:  U/A:   Lab Results   Component Value Date    NITRU NEGATIVE 04/08/2020    COLORU YELLOW 04/08/2020    PHUR 5.5 04/08/2020    WBCUA None 04/08/2020    RBCUA 2 TO 5 04/08/2020    MUCUS NOT REPORTED 04/08/2020    TRICHOMONAS NOT REPORTED 04/08/2020    YEAST NOT REPORTED

## 2020-04-09 NOTE — PROGRESS NOTES
sodium succinate PF  50 mg Intravenous Q8H            Social History:      Social History               Socioeconomic History    Marital status:        Spouse name: Not on file    Number of children: Not on file    Years of education: Not on file    Highest education level: Not on file   Occupational History    Not on file   Social Needs    Financial resource strain: Not on file    Food insecurity       Worry: Not on file       Inability: Not on file    Transportation needs       Medical: Not on file       Non-medical: Not on file   Tobacco Use    Smoking status: Never Smoker    Smokeless tobacco: Never Used   Substance and Sexual Activity    Alcohol use: No    Drug use: Not on file    Sexual activity: Not on file   Lifestyle    Physical activity       Days per week: Not on file       Minutes per session: Not on file    Stress: Not on file   Relationships    Social connections       Talks on phone: Not on file       Gets together: Not on file       Attends [employer] service: Not on file       Active member of club or organization: Not on file       Attends meetings of clubs or organizations: Not on file       Relationship status: Not on file    Intimate partner violence       Fear of current or ex partner: Not on file       Emotionally abused: Not on file       Physically abused: Not on file       Forced sexual activity: Not on file   Other Topics Concern    Not on file   Social History Narrative    Not on file            Family History:      Family History         Family History   Problem Relation Age of Onset    Asthma Daughter      Diabetes Son      Heart Attack Mother           PAD/PVD    Prostate Cancer Brother              Allergies:   Adhesive tape; Benadryl [diphenhydramine]; Shellfish-derived products; Atarax [hydroxyzine]; Bactrim [sulfamethoxazole-trimethoprim]; Codeine; Dicyclomine;  Hydroxyzine hcl; Other; Penicillins; Pregabalin; and Sulfa antibiotics      Review of Extremities: No cyanosis, clubbing, edema, or effusions. : Ibarra in place, scant urine output  Neurologic: Sedated  Skin: Warm and dry with good turgor.     Medical Decision Making -Laboratory:   I have independently reviewed/ordered the following labs:     CBC with Differential:       Recent Labs     20   WBC 8.3   HGB 12.5   HCT 36.7      LYMPHOPCT 2*   MONOPCT 6      BMP:        Recent Labs     20  0553   *  --    K 4.8  --      --    CO2 11*  --    BUN 38*  --    CREATININE 1.83* 1.74*      Hepatic Function Panel:       Recent Labs     20   PROT 8.2   LABALBU 3.7   BILITOT 0.47   ALKPHOS 97   ALT 17   AST 71*      No results for input(s): RPR in the last 72 hours. No results for input(s): HIV in the last 72 hours. No results for input(s): BC in the last 72 hours. Lab Results   Component Value Date     MUCUS NOT REPORTED 2020     RBC 3.86 2020     RBC 4.12 03/10/2012     TRICHOMONAS NOT REPORTED 2020     WBC 8.3 2020     YEAST NOT REPORTED 2020     TURBIDITY CLEAR 2020            Lab Results   Component Value Date     CREATININE 1.74 2020     CREATININE 1.83 2020     GLUCOSE 95 2020         Medical Decision Making-Imagin-8 CXR    EXAMINATION:   ONE XRAY VIEW OF THE CHEST       2020 4:16 am       COMPARISON:   2018.       HISTORY:   ORDERING SYSTEM PROVIDED HISTORY: hypoxia, cough   TECHNOLOGIST PROVIDED HISTORY:   hypoxia, cough   Reason for Exam: portable upright/ hypoxia/ SOB and cough   Acuity: Acute   Type of Exam: Initial       FINDINGS:   Frontal portable view of the chest.  Multifocal bilateral heterogeneous   pulmonary opacities with consolidation in the right mid and lower lung zones. No pleural effusion or pneumothorax.  Cardiomegaly.  Atherosclerotic thoracic   aorta.  Multilevel degenerative disc disease.  Multilevel   vertebroplasty/kyphoplasty.  Right upper quadrant cholecystectomy clips. Osteopenia.           Impression   Multifocal bilateral heterogeneous pulmonary opacities with consolidation in   the right mid and lower lung zones.  Differential considerations include   multifocal pneumonia including atypical or viral pneumonia and asymmetric   pulmonary edema.       Cardiomegaly.         Medical Decision Icwdfl-Kpafvfub-Xvpwn:         Medical Decision Making-Other:      Note:  · Labs, medications, radiologic studies were reviewed with personal review of films  · Moderate Large amounts of data were reviewed  · Discussed with nursing Staff, Discharge planner  · Infection Control and Prevention measures reviewed  · All prior entries were reviewed  · Administer medications as ordered  · Prognosis: Very Guarded  · Discharge planning reviewed  · Follow up as outpatient.     Thank you for allowing us to participate in the care of this patient.  Please call with questions.     Sharon Sorensen MD  Pager: [phone] - Office: [phone]

## 2020-04-09 NOTE — PROGRESS NOTES
Overweight    Nutrition Interventions:   Continue NPO(If nutrition support requested, suggest TF of Vital AF 1.2 (semi-elemental) of 45 mL/hr. If bolus feeding requested, suggest 250 mL x 4 per day.)  Continued Inpatient Monitoring, Education Not Indicated    Nutrition Evaluation:   · Evaluation: Goals set   · Goals: Start of nutrition within 48 hours. Meet % of estimated nutrition needs.    · Monitoring: Nutrition Progression, Skin Integrity, I&O, Weight, Pertinent Labs, Monitor Hemodynamic Status, Monitor Bowel Function    Electronically signed by Sisi Warner RD, LD on 4/9/20 at 12:09 PM EDT    Contact Number: [phone]

## 2020-04-09 NOTE — PROGRESS NOTES
[] No   [x] Yes   (Date of Insertion:   )           If yes -     [] Right IJ     [] Left IJ [x] Right Femoral [] Left Femoral                   [] Right Subclavian [] Left Subclavian       SHARP'S CATHETER:   [] No   [x] Yes  (Date of Insertion:   )     URINE OUTPUT:            [] Good   [x] Low              [] Anuric      OBJECTIVE:     VITAL SIGNS:  BP (!) 110/50   Pulse 57   Temp 94.6 °F (34.8 °C) (Bladder)   Resp 20   Ht 5' (1.524 m)   Wt 146 lb 3.2 oz (66.3 kg)   SpO2 93%   BMI 28.55 kg/m²   Tmax over 24 hours:  Temp (24hrs), Av.4 °F (35.8 °C), Min:94.5 °F (34.7 °C), Max:97.7 °F (36.5 °C)      Patient Vitals for the past 8 hrs:   BP Temp Temp src Pulse Resp SpO2 Weight   20 0700 (!) 110/50 -- -- 57 20 93 % --   20 0600 (!) 101/50 94.6 °F (34.8 °C) Bladder 54 21 95 % 146 lb 3.2 oz (66.3 kg)   20 0510 -- -- -- 50 24 100 % --   20 0500 (!) 93/46 -- -- 50 24 100 % --   20 0400 (!) 91/46 94.5 °F (34.7 °C) Bladder 51 24 98 % --   20 0330 -- -- -- 54 24 98 % --   20 0315 -- -- -- 56 24 98 % --   20 0300 (!) 107/50 -- -- 61 21 99 % --   20 0245 -- -- -- 58 15 100 % --   20 0215 -- -- -- 53 22 100 % --   20 0200 -- -- -- 54 22 100 % --   20 0100 -- 97 °F (36.1 °C) Bladder 58 16 99 % --         Intake/Output Summary (Last 24 hours) at 2020 0817  Last data filed at 2020 0600  Gross per 24 hour   Intake 1201.93 ml   Output 934 ml   Net 267.93 ml     Date 20 0000 - 20 2359   Shift 9718-5261 0223-3446 8686-0820 24 Hour Total   INTAKE   I.V.(mL/kg) 920.9(13.9)   920.9(13.9)   NG/GT(mL/kg) 40(0.6)   40(0.6)   Shift Total(mL/kg) 960.9(14.5)   960.9(14.5)   OUTPUT   Urine(mL/kg/hr) 109(0.2)   109   Shift Total(mL/kg) 109(1.6)   109(1.6)   Weight (kg) 66.3 66.3 66.3 66.3     Wt Readings from Last 3 Encounters:   20 146 lb 3.2 oz (66.3 kg)   20 140 lb (63.5 kg)   10/20/19 145 lb (65.8 kg)     Body mass index is 28.55 Mouthwash, Lip moisturizer applied, Mouth swabbed, Mouth moisturizer, Mouth suctioned  Subglottic Suction Done?: Yes    ABGs: . Lab Results   Component Value Date    VQP3OVA 20 04/09/2020    FIO2 60.0 04/09/2020     Lactic Acid: No results found for: LACTA      DATA:  Complete Blood Count:   Recent Labs     04/08/20  0349 04/09/20  0657   WBC 8.3 8.4   HGB 12.5 8.8*   MCV 95.1 96.4    143   RBC 3.86* 2.77*   HCT 36.7 26.7*   MCH 32.4 31.8   MCHC 34.1 33.0   RDW 13.3 13.6   MPV 10.6 10.5        PT/INR:    Lab Results   Component Value Date    PROTIME 10.3 04/08/2020    INR 1.0 04/08/2020     PTT:    Lab Results   Component Value Date    APTT 27.2 04/08/2020       Basal Metabolic Profile:   Recent Labs     04/08/20  0349 04/08/20  0553 04/08/20  1900 04/09/20  0657   *  --  133* 138   K 4.8  --  5.0 4.2   BUN 38*  --  43* 43*   CREATININE 1.83* 1.74* 1.83* 1.75*     --  106 105   CO2 11*  --  10* 18*      Magnesium:   Lab Results   Component Value Date    MG 2.0 03/12/2013     Phosphorus: No results found for: PHOS  S. Calcium:  Recent Labs     04/09/20  0657   CALCIUM 8.0*     S. Ionized Calcium:No results for input(s): IONCA in the last 72 hours. Urinalysis:   Lab Results   Component Value Date    NITRU NEGATIVE 04/08/2020    COLORU YELLOW 04/08/2020    PHUR 5.5 04/08/2020    WBCUA None 04/08/2020    RBCUA 2 TO 5 04/08/2020    MUCUS NOT REPORTED 04/08/2020    TRICHOMONAS NOT REPORTED 04/08/2020    YEAST NOT REPORTED 04/08/2020    BACTERIA NOT REPORTED 04/08/2020    SPECGRAV 1.019 04/08/2020    LEUKOCYTESUR NEGATIVE 04/08/2020    UROBILINOGEN Normal 04/08/2020    BILIRUBINUR NEGATIVE 04/08/2020    GLUCOSEU NEGATIVE 04/08/2020    KETUA NEGATIVE 04/08/2020    AMORPHOUS NOT REPORTED 04/08/2020       CARDIAC ENZYMES: No results for input(s): CKMB, CKMBINDEX, TROPONINI in the last 72 hours. Invalid input(s): CKTOTAL;3  BNP: No results for input(s): BNP in the last 72 hours.     LFTS  Recent Labs     04/08/20  0349 04/09/20  0657   ALKPHOS 97 79   ALT 17 12   AST 71* 60*   BILITOT 0.47 0.35   BILIDIR  --  0.27   LABALBU 3.7 2.4*       AMYLASE/LIPASE/AMMONIA  Recent Labs     04/08/20  0349   AMMONIA 31       Last 3 Blood Glucose:   Recent Labs     04/08/20  0349 04/08/20  1900 04/09/20  0657   GLUCOSE 95 121* 138*      HgBA1c:  No results found for: LABA1C      TSH:  No results found for: TSH  ANEMIA STUDIES  Recent Labs     04/08/20  0604   FERRITIN 1,484*               Cultures during this admission:     Blood cultures:                 [] None drawn      [x] Negative             []  Positive (Details:  )  Urine Culture:                   [] None drawn      [x] Negative             []  Positive (Details:  )  Sputum Culture:               [] None drawn       [x] Negative             []  Positive (Details:  )   Endotracheal aspirate:     [] None drawn       [x] Negative             []  Positive (Details:  )             Chest Xray (4/9/2020):    ASSESSMENT:     Active Problems:    Respiratory distress    Current chronic use of systemic steroids    Acute respiratory failure with hypoxia ([facility] [address]    Suspected COVID-19 virus infection    Community acquired pneumonia    FRANSISCA (acute kidney injury) ([facility] [address]    Metabolic acidosis  Resolved Problems:    * No resolved hospital problems. *          PLAN:     1. Acute hypoxic resp failure sec to COVID infection likely. Intubated on PRVC mode-RR-24/380/10/50% fio2 spo2- 95%  Sedated on 40 fentanyl and 50 propofol. ABG-7.36/pco2-34.2/po2-101/hco3 19.3  On zithromax and Plaquenil till 4/13. QTc 447. ID following. Hemoglobin dropped to 8.8 from 12 this morning. Minimal bloody secretions from OG. No signs and symptoms of bleeding this morning. 2. FRANSISCA- cr-1.27  co2-18 improving. U/O-934/24hrs. On bicarb gtt at 75ml/hr. 3.PMR and GCA- on chronic steroids. On solucortef 50 Q8.  4. Aortic ulcer with mobile thrombus-on asa. Received 1 dose of Plavix yesterday.   5. TIA

## 2020-04-10 NOTE — PROGRESS NOTES
Expand All Collapse All      [facility] - Progress Note    Today's Date and Time: 4/8/2020, 4:33 PM     Impression : ·   COVID exposure  · COVID pneumonia  · Respiratory failure  · Polymyalgia Rheumatica  · Immunosuppression - on chronic steroid use  · Chronic compression fractures     Recommendations: ·   On treatment for COVID pneumonia:  · Plaquenil x 5 days - stop date 4-13  · Azithromycin starting 4-9, stop date 4-13  · ECG, Mg, LFT's daily while on therapy  · Monitor renal function closely   · Monitor QTc carefully in view of progressive increase. May need to stop azithromycin if QTC goes higher.     Medical Decision Making/Summary/Discussion:4/8/2020    ·      Infection Control Recommendations ·   Universal Precautions   · Airborne isolation  · Droplet Isolation        Antimicrobial Stewardship Recommendations      · Simplification of therapy  · Targeted therapy     Coordination of Outpatient Care: ·   Estimated Length of IV antimicrobials: 5 days  · Patient will need Midline Catheter Insertion:   · Patient will need PICC line Insertion:  · Patient will need: Home IV , [place],  SNF,  LTAC:  · Patient will need outpatient wound care:     Chief complaint/reason for consultation: ·   RO COVID        History of Present Illness:   Jerri Romero is a 82y.o.-year-old  female who was initially admitted on 4/8/2020. Patient seen at the request of Dr. Kevin Mcclure     INITIAL HISTORY:      Pt is an 81 yo woman who presented to the ED with SOB and fever to 103. 1. He SaO2 was in the 50's and she was tachycardic. Per the notes she lives with 2 people who are COVID positive and currently hospitalized.      Her PMH includes PMR and temporal arteritis, she is on chronic steroid therapy.  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      She was seen at [facility] ED on 4-1 with mental status changes after taking 1/2 of an ativan tablet. She also reported that she had vomited x 2. At that time a CT brain and chest were both negative for acute processes.      Her mental status improved, and she was discharged home.      In the ED she was placed on oxygen with improvement in her SaO2.   1L fluid bolus was given and HR improved.     Labs in ED showed:  WBC 8.3  Cr 1.83 (baseline 1.0)  Lactate 2.9  Troponin 41  Lymph % 2  Ferritin 1484     CXR showed: Impression   Multifocal bilateral heterogeneous pulmonary opacities with consolidation in   the right mid and lower lung zones.  Differential considerations include   multifocal pneumonia including atypical or viral pneumonia and asymmetric   pulmonary edema.       Cardiomegaly.         She was given a dose of Levaquin 750 mg x 1 and admitted to the [facility] step down unit for further care. Her oxygen requirement increased and she was transferred to the ICU and intubated on 4-8.     CURRENT EXAMINATION: 4/8/2020     Pt seen in ICU     Afebrile  VS stable    She is intubated and sedated  FIO2 60%   Peep 12    FRANSISCA present    Per RN, no other acute issues       NEWS Score: 0-4 Low risk group; 5-6: Medium risk group; 7 or above: High risk group  Parameters 3 2 1 0 1 2 3   Age       < 65     ? 65   RR ? 8   9-11 12-20   21-24 ? 25   O2 Sats ? 91 92-93 94-95 ? 96         Suppl O2   Yes   No         SBP ? 90  101-110 111-219     ? 220   HR ? 40   41-50 51-90  111-130 ? 131   Consciousness       Alert     Drowsiness, lethargy, or confusion   Temperature ? 35.0 C (95.0 F)   35.1-36.0 C 95.1-96.9 F 36.1-38.0 C 97.0-100.4 F 38.1-39.0 C 100.5-102.3 F ? 39.1 C ? 102.4 F        NEWS Score:  · 4-8-20: 11 high risk  · 4-9-20: 14  · 4-10-20: 10     Labs, X rays reviewed: 4/8/2020     BUN: 38-->43->42  Cr: 1.83-->1.75->1. 7     .9    ProBNP 953  Ferritin 1484    QTc 425-->447>468     WBC: 8.3-->8.0->6.7  Hb: 12.5-->10.7->8.1  Plat:  177-->221->137     Lymph %

## 2020-04-10 NOTE — PROGRESS NOTES
04/08/2020    COLORU YELLOW 04/08/2020    PHUR 5.5 04/08/2020    WBCUA None 04/08/2020    RBCUA 2 TO 5 04/08/2020    MUCUS NOT REPORTED 04/08/2020    TRICHOMONAS NOT REPORTED 04/08/2020    YEAST NOT REPORTED 04/08/2020    BACTERIA NOT REPORTED 04/08/2020    SPECGRAV 1.019 04/08/2020    LEUKOCYTESUR NEGATIVE 04/08/2020    UROBILINOGEN Normal 04/08/2020    BILIRUBINUR NEGATIVE 04/08/2020    GLUCOSEU NEGATIVE 04/08/2020    KETUA NEGATIVE 04/08/2020    AMORPHOUS NOT REPORTED 04/08/2020       RADIOLOGY      ASSESSMENT    1. Acute kidney injury due to hypoxic ATN other possibility include interstitial nephritis due to ongoing viral infection. Her creatinine remained stable and 1.7 mg/dL. Patient has fair urine output but intake and output wise now she is close to 3 L positive  2. Multifocal pneumonia  3. Metabolic acidosis improved  4. Hypokalemia she is receiving potassium supplements patient has a normal magnesium level  5. Mild pulmonary congestion  PLAN      1. Lasix 40 mg IV x1 today  2. Checks x-ray in a.m. 3.  CBC and BMP in a.m. Please do not hesitate to call with questions.   This note is created with the assistance of a speech-recognition program. While intending to generate a document that actually reflects the content of the visit, no guarantees can be provided that every mistake has been identified and corrected by editing  Electronically signed by Mata Negro MD on 4/10/2020 at 12:19 PM      Electronically signed by Mata Negro MD on 4/10/2020 at 12:13 PM

## 2020-04-10 NOTE — PROGRESS NOTES
LINES:     [] No   [x] Yes   (Date of Insertion:   )           If yes -     [] Right IJ     [] Left IJ [x] Right Femoral [] Left Femoral                   [] Right Subclavian [] Left Subclavian       SHARP'S CATHETER:   [] No   [x] Yes  (Date of Insertion:   )     URINE OUTPUT:            [] Good   [x] Low              [] Anuric      OBJECTIVE:     VITAL SIGNS:  BP (!) 80/43   Pulse 57   Temp 94.6 °F (34.8 °C) (Bladder)   Resp 24   Ht 5' (1.524 m)   Wt 146 lb 3.2 oz (66.3 kg)   SpO2 97%   BMI 28.55 kg/m²   Tmax over 24 hours:  No data recorded. Patient Vitals for the past 8 hrs:   Pulse Resp SpO2   04/10/20 0600 57 24 97 %   04/10/20 0500 62 24 96 %   04/10/20 0400 60 23 98 %   04/10/20 0300 72 14 91 %   04/10/20 0252 -- 23 98 %   04/10/20 0247 56 24 98 %   04/10/20 0200 58 24 98 %   04/10/20 0100 59 23 97 %   04/10/20 0000 64 24 97 %         Intake/Output Summary (Last 24 hours) at 4/10/2020 0755  Last data filed at 4/10/2020 0400  Gross per 24 hour   Intake 2433.2 ml   Output 375 ml   Net 2058. 2 ml     Date 04/10/20 0000 - 04/10/20 2359   Shift 4613-1565 5265-0471 9210-9212 24 Hour Total   INTAKE   I.V.(mL/kg) 1497.2(22.6)   1497.2(22.6)   Shift Total(mL/kg) 1497.2(22.6)   1497.2(22.6)   OUTPUT   Urine(mL/kg/hr) 300   300   Shift Total(mL/kg) 300(4.5)   300(4.5)   Weight (kg) 66.3 66.3 66.3 66.3     Wt Readings from Last 3 Encounters:   04/09/20 146 lb 3.2 oz (66.3 kg)   01/02/20 140 lb (63.5 kg)   10/20/19 145 lb (65.8 kg)     Body mass index is 28.55 kg/m². PHYSICAL EXAM:  · General appearance:Intubated and sedated   · HEENT: Head: Normocephalic, no lesions, without obvious abnormality.   · Lungs: clear to auscultation bilaterally  · Heart: regular rate and rhythm, S1, S2 normal, no murmur  · Abdomen: soft, non-tender; bowel sounds normal; no masses,  no organomegaly  · Extremities: left foot bruised   · Neurological: Intubated and sedated, does not follow commands   · Eye no icterus no --  93.1    143  --  137*   RBC 3.86* 2.77*  --  2.60*   HCT 36.7 26.7* 27.3* 24.2*   MCH 32.4 31.8  --  31.2   MCHC 34.1 33.0  --  33.5   RDW 13.3 13.6  --  13.4   MPV 10.6 10.5  --  10.7        PT/INR:    Lab Results   Component Value Date    PROTIME 10.3 04/08/2020    INR 1.0 04/08/2020     PTT:    Lab Results   Component Value Date    APTT 27.2 04/08/2020       Basal Metabolic Profile:   Recent Labs     04/09/20  0657 04/09/20  1841 04/10/20  0402    136 136   K 4.2 3.3* 3.0*   BUN 43* 44* 42*   CREATININE 1.75* 1.70* 1.70*    100 98   CO2 18* 21 22      Magnesium:   Lab Results   Component Value Date    MG 2.2 04/10/2020    MG 2.2 04/09/2020    MG 2.0 03/12/2013     Phosphorus: No results found for: PHOS  S. Calcium:  Recent Labs     04/10/20  0402   CALCIUM 7.4*     S. Ionized Calcium:No results for input(s): IONCA in the last 72 hours. Urinalysis:   Lab Results   Component Value Date    NITRU NEGATIVE 04/08/2020    COLORU YELLOW 04/08/2020    PHUR 5.5 04/08/2020    WBCUA None 04/08/2020    RBCUA 2 TO 5 04/08/2020    MUCUS NOT REPORTED 04/08/2020    TRICHOMONAS NOT REPORTED 04/08/2020    YEAST NOT REPORTED 04/08/2020    BACTERIA NOT REPORTED 04/08/2020    SPECGRAV 1.019 04/08/2020    LEUKOCYTESUR NEGATIVE 04/08/2020    UROBILINOGEN Normal 04/08/2020    BILIRUBINUR NEGATIVE 04/08/2020    GLUCOSEU NEGATIVE 04/08/2020    KETUA NEGATIVE 04/08/2020    AMORPHOUS NOT REPORTED 04/08/2020       CARDIAC ENZYMES: No results for input(s): CKMB, CKMBINDEX, TROPONINI in the last 72 hours. Invalid input(s): CKTOTAL;3  BNP: No results for input(s): BNP in the last 72 hours.     LFTS  Recent Labs     04/08/20  0349 04/09/20  0657 04/09/20  1841 04/10/20  0402   ALKPHOS 97 79 76 77   ALT 17 12 15 15   AST 71* 60* 78* 80*   BILITOT 0.47 0.35 0.32 0.29*   BILIDIR  --  0.27  --   --    LABALBU 3.7 2.4* 2.4* 2.2*       AMYLASE/LIPASE/AMMONIA  Recent Labs     04/08/20  0349   AMMONIA 31       Last 3 Blood off.  4. Aortic ulcer with mobile thrombus-on asa and Plavix. 5. TIA in past    On tube feeds. DVT prophylaxis subcu heparin. GI prophylaxis on Pepcid IV 20 mg daily    Candida Benton M.D. Department of Internal Medicine/ Critical care  \[facility] [place] [facility]\"")             4/10/2020, 7:55 AM    Attending Physician Statement  I have discussed the care of Johnny Hancock, including pertinent history and exam findings with the resident. I have reviewed the key elements of all parts of the encounter with the resident. I have seen and examined the patient with the resident. I agree with the assessment and plan and status of the problem list as documented. I reviewed the chart, labs and medications reviewed ventilator setting arterial blood gases seen and events last 24 hours seen. She is hypotensive this morning but did not require Levophed and currently blood pressure systolic is above 489 she is bradycardic with heart rate of 60, she is afebrile. Her arterial blood gases consistent with respiratory and metabolic alkalosis she was on bicarbonate drip. Urine output reported to be 375 mL in last 24-hour although she had 200 mL in last 4 hours since this morning. Her potassium is 3.0 creatinine is 1.70 stable  She is on fentanyl drip and also on Versed drip at 3 mg an hour  Ventilator setting PRVC/24/380/10/55% and ABG 7.50/34/80/27  She is on hydroxychloroquine and Zithromax and her QTC is 468 we will continue to monitor potassium and magnesium and will keep potassium more than 4 and magnesium 2. She is getting potassium 60 mEq and will repeat potassium again later today. We will decrease her respiratory rate to 20 from 24. On hydrocortisone (chronic steroid use)  Continue supportive care and minimize fluid. Start tube feeding. Continue with airborne/contact/droplet precaution. Discussed with nursing staff.   Discussed with respiratory therapist.      Total critical care time caring for this patient with life threatening, unstable organ failure, including direct patient contact, management of life support systems, review of data including imaging and labs, discussions with other team members and physicians at least 27  Min so far today, excluding procedures. Please note that this chart was generated using voice recognition Dragon dictation software. Although every effort was made to ensure the accuracy of this automated transcription, some errors in transcription may have occurred.       Ananya Cuevas MD  4/10/2020 11:08 AM

## 2020-04-11 NOTE — PROGRESS NOTES
[facility]  Progress Note    Tiki Herrera  MRN: [id number]  Date: 4/11/2020    Reason for F/U :   2019 novel coronavirus infection    Impression :   1. Respiratory failure  2. Polymyalgia rheumatica on chronic steroid use  3. SARS-CoV-2 pneumonia    Recommendations:   · Continue Plaquenil and azithromycin through 4/13/2020  · We will continue to monitor the QT interval.  · Chest x-ray today continues to show bilateral airspace disease. · The patient continues to require 75% FiO2 with 14 of PEEP  · Culture data all remains negative thus far    Infection Control Recommendations:   Droplet plus precautions    Discharge Planning:   Estimated Length of IV antimicrobials: 4/13/2020  Patient will need Midline Catheter Insertion/ PICC line Insertion: No  Patient will need: Home IV , [place],  SNF,  LTAC: Undetermined  Patient willneed outpatient wound care: No    MedicalDecision making / Summary of Stay:   Krystal Franco a 82y.o.-year-old  female who was initially admitted on 4/8/2020. Patient seen at the request of Domitila Gomez HISTORY:      Pt is an 81 yo woman who presented to the ED with SOB and fever to 103. 1. He SaO2 was in the 50's and she was tachycardic.  Per the notes she lives with 2 people who are COVID positive and currently hospitalized.      Her PMH includes PMR and temporal arteritis, she is on chronic steroid therapy. 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      She was seen at [facility] ED on 4-1 with mental status changes after taking 1/2 of an ativan tablet. She also reported that she had vomited x 2.  At that time a CT brain and chest were both negative for acute processes.      Her mental status improved, and she was discharged home.      In the ED she was placed on oxygen with improvement in her SaO2.   1L fluid bolus was given and HR improved.     Labs in ED showed:  WBC 8.3  Cr 1.83 (baseline 24.2* 25.1*   * 131*   LYMPHOPCT 2* 7*   MONOPCT 2 3     BMP:   Recent Labs     04/10/20  0402 04/10/20  1707 04/11/20 0421     --  136   K 3.0* 3.2* 3.9   CL 98  --  95*   CO2 22  --  27   BUN 42*  --  44*   CREATININE 1.70*  --  1.57*   MG 2.2 2.1  --      Hepatic Function Panel:   Recent Labs     04/09/20  0657  04/10/20  0402 04/11/20  0421   PROT 6.0*   < > 5.4* 5.5*   LABALBU 2.4*   < > 2.2* 2.1*   BILIDIR 0.27  --   --   --    IBILI 0.08  --   --   --    BILITOT 0.35   < > 0.29* 0.34   ALKPHOS 79   < > 77 81   ALT 12   < > 15 15   AST 60*   < > 80* 74*    < > = values in this interval not displayed. No results for input(s): VANCOTROUGH in the last 72 hours. Lab Results   Component Value Date    .9 (H) 04/08/2020     Lab Results   Component Value Date    SEDRATE 35 (H) 03/10/2012       No results for input(s): PROCAL in the last 72 hours. Imaging Studies:   ONE XRAY VIEW OF THE CHEST 4/11/2020 12:06 pm  Impression   Chronic pulmonary change with scattered airspace opacities most consistent   with pneumonia that is worse on the right compared to the left.       Support tubes as described above. Cultures:     Culture, Respiratory [[id number]] Collected: 04/08/20 1507   Order Status: Completed Specimen: Endotracheal Updated: 04/11/20 1124    Specimen Description . ENDOTRACHEAL    Special Requests NOT REPORTED    Direct Exam >10, <25 NEUTROPHILS/LPF     < 10 EPITHELIAL CELLS/LPF     NO SIGNIFICANT PATHOGENS SEEN    Culture NORMAL RESPIRATORY CARYN LIGHT GROWTH   Culture, Blood 1 [[id number]] Collected: 04/08/20 1211   Order Status: Completed Specimen: Blood Updated: 04/11/20 0829    Specimen Description . BLOOD    Special Requests A LINE    Culture NO GROWTH 3 DAYS   Culture, Blood 1 [[id number]] Collected: 04/08/20 1211   Order Status: Completed Specimen: Blood Updated: 04/11/20 0829    Specimen Description . BLOOD    Special Requests CENTRAL LINE    Culture NO GROWTH 3 DAYS

## 2020-04-11 NOTE — PROGRESS NOTES
Critical Care Team - Daily Progress Note      Date and time: 4/11/2020 8:27 AM  Patient's name:  Jez Tavera Record Number: [id number]  Patient's account/billing number: [de-identified]  Patient's YOB: 1935  Age: 82 y.o. Date of Admission: 4/8/2020  3:03 AM  Length of stay during current admission: 3      Primary Care Physician: Celsa Jaimes MD  ICU Attending Physician: Dr. Wallace Nip Status: Full Code    Reason for ICU admission:   Chief Complaint   Patient presents with    Cough    Shortness of Breath    Fever         SUBJECTIVE:     OVERNIGHT EVENTS:        No acute overnight events. Cr trending down  Mg 2.1 other electrolytes WNL  Vitals WNL  No fever  ABG Ph 7.528, pco2 37, po2 64, bicarb 31.2  Intubated on PRVC mode-RR-20/380/16/85% fio2 spo2- 95%  Went upon FI02 because of p02 46, but saturating fine. Discussed with RT to adjust vent and remove the blood in art line before drawing ABG. Sedation-Versad and fentanyl. Moves extremities to pain. ABG-7.50/pco2-34.5/po2-80. 4/Hco3 26.9  On bicarb gtt at 75ml/hr currently on KVO NS   Platelets 950, will monitor  Lymphocytes 7 improving    Intake/Output Summary (Last 24 hours) at 4/11/2020 0842  Last data filed at 4/11/2020 0637  Gross per 24 hour   Intake 2346.86 ml   Output 1650 ml   Net 696.86 ml     Net 4 L +ve  Received 40 IV lasix  Will get cxr today  On zithromax and plaquenil. QTc prolonged 425<447<462  On tube feeds bolus 250 q6       AWAKE & FOLLOWING COMMANDS:  [x] No   [] Yes    CURRENT VENTILATION STATUS:     [x] Ventilator  [] BIPAP  [] Nasal Cannula [] Room Air        SECRETIONS Amount:  [x] Small [] Moderate  [] Large  [] None  Color:     [x] White [] Colored  [] Bloody    SEDATION:  RAAS Score:    And fentanyl [x] Versed gtt  [] Ativan gtt   [] No Sedation    PARALYZED:  [x] No    [] Yes    DIARRHEA:                [x] No                [] Yes  (C. Difficile status: [] positive [] negative                                                                                                                     [] pending)    VASOPRESSORS:  [x] No    [] Yes    If yes -   [] Levophed       [] Dopamine     [] Vasopressin       [] Dobutamine  [] Phenylephrine         [] Epinephrine    CENTRAL LINES:     [] No   [x] Yes   (Date of Insertion:   )           If yes -     [] Right IJ     [] Left IJ [x] Right Femoral [] Left Femoral                   [] Right Subclavian [] Left Subclavian       SHARP'S CATHETER:   [] No   [x] Yes  (Date of Insertion:   )     URINE OUTPUT:            [x] Good   [] Low              [] Anuric      OBJECTIVE:     VITAL SIGNS:  /63   Pulse 66   Temp 99.3 °F (37.4 °C) (Bladder)   Resp 19   Ht 5' (1.524 m)   Wt 146 lb 6.2 oz (66.4 kg)   SpO2 93%   BMI 28.59 kg/m²   Tmax over 24 hours:  Temp (24hrs), Av.3 °F (37.4 °C), Min:99.3 °F (37.4 °C), Max:99.3 °F (37.4 °C)      Patient Vitals for the past 8 hrs:   BP Pulse Resp SpO2 Weight   20 0615 -- -- -- -- 146 lb 6.2 oz (66.4 kg)   20 0601 -- -- 19 93 % --   20 0500 116/63 66 21 100 % --   20 0415 -- 75 16 (!) 89 % --   20 0400 (!) 117/55 68 14 92 % --   20 0300 (!) 111/46 56 18 99 % --   20 0200 (!) 107/45 56 17 99 % --   20 0100 (!) 103/44 57 16 98 % --         Intake/Output Summary (Last 24 hours) at 2020 0827  Last data filed at 2020 3163  Gross per 24 hour   Intake 2346.86 ml   Output 1650 ml   Net 696.86 ml     Date 20 0000 - 20 2359   Shift 8502-7528 1651-8116 6005-2238 24 Hour Total   INTAKE   I.V.(mL/kg) 446(6.7)   446(6.7)   NG/GT(mL/kg) 310(4.7)   310(4.7)   Shift Total(mL/kg) 756(11.4)   756(11.4)   OUTPUT   Urine(mL/kg/hr) 400(0.8)   400   Shift Total(mL/kg) 400(6)   400(6)   Weight (kg) 66.4 66.4 66.4 66.4     Wt Readings from Last 3 Encounters:   20 146 lb 6.2 oz (66.4 kg)   01/02/20 140 lb (63.5 kg)   10/20/19 145 lb (65.8 kg)     Body mass index is 28.59 kg/m². PHYSICAL EXAM:  · General appearance:Intubated and sedated   · HEENT: Head: Normocephalic, no lesions, without obvious abnormality.   · Lungs: clear to auscultation bilaterally  · Heart: regular rate and rhythm, S1, S2 normal, no murmur  · Abdomen: soft, non-tender; bowel sounds normal; no masses,  no organomegaly  · Extremities: left foot bruised   · Neurological: Intubated and sedated, does not follow commands   · Eye no icterus no redness    Any additional physical findings:      MEDICATIONS:  Scheduled Meds:   potassium bicarb-citric acid  40 mEq Oral Daily    hydrocortisone sodium succinate PF  50 mg Intravenous Q12H    clopidogrel  75 mg Oral Daily    famotidine (PEPCID) injection  20 mg Intravenous Daily    sodium chloride flush  10 mL Intravenous 2 times per day    heparin (porcine)  5,000 Units Subcutaneous 3 times per day    aspirin  81 mg Oral Daily    atorvastatin  20 mg Oral Daily    hydroxychloroquine  200 mg Oral BID    azithromycin  250 mg Intravenous Q24H     Continuous Infusions:   sodium chloride 25 mL/hr at 04/10/20 1300    midazolam 2 mg/hr (04/09/20 1800)    fentaNYL 20 mcg/hr (04/10/20 2326)     PRN Meds:   prochlorperazine, 10 mg, Q6H PRN  sodium chloride flush, 10 mL, PRN  acetaminophen, 650 mg, Q6H PRN    Or  acetaminophen, 650 mg, Q6H PRN  polyethylene glycol, 17 g, Daily PRN        SUPPORT DEVICES: [x] Ventilator [] BIPAP  [] Nasal Cannula [] Room Air    VENT SETTINGS (Comprehensive) (if applicable):  Vent Information  $Ventilation: $Subsequent Day  Ventilator Started: Yes  Skin Assessment: Clean, dry, & intact  Equipment ID: [id number]  Vent Type: Servo i  Vent Mode: PRVC  Vt Ordered: 380 mL  Rate Set: 20 bmp  FiO2 : 85 %  Sensitivity: 3  PEEP/CPAP: 14  I Time/ I Time %: 0.75 s  Humidification Source: E  Additional Respiratory  Assessments  Pulse: 66  Resp:

## 2020-04-12 NOTE — PROGRESS NOTES
[facility]  Progress Note    Flaquita Banegas  MRN: [id number]  Date: 4/12/2020    Reason for F/U :   2019 novel coronavirus infection    Impression :   1. Respiratory failure  2. Polymyalgia rheumatica on chronic steroid use  3. SARS-CoV-2 pneumonia    Recommendations:   · Continue Plaquenil and azithromycin through 4/13/2020  · We will continue to monitor the QT interval.  · Chest x-ray today continues to show bilateral airspace disease. · Culture data all remains negative thus far    Infection Control Recommendations:   Droplet plus precautions    Discharge Planning:   Estimated Length of IV antimicrobials: 4/13/2020  Patient will need Midline Catheter Insertion/ PICC line Insertion: No  Patient will need: Home IV , [place],  SNF,  LTAC: Undetermined  Patient willneed outpatient wound care: No    MedicalDecision making / Summary of Stay:   Josef way 82y.o.-year-old  female who was initially admitted on 4/8/2020. Patient seen at the request of Leah Lux HISTORY:      Pt is an 83 yo woman who presented to the ED with SOB and fever to 103. 1. He SaO2 was in the 50's and she was tachycardic.  Per the notes she lives with 2 people who are COVID positive and currently hospitalized.      Her PMH includes PMR and temporal arteritis, she is on chronic steroid therapy. 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      She was seen at [facility] ED on 4-1 with mental status changes after taking 1/2 of an ativan tablet. She also reported that she had vomited x 2.  At that time a CT brain and chest were both negative for acute processes.      Her mental status improved, and she was discharged home.      In the ED she was placed on oxygen with improvement in her SaO2.   1L fluid bolus was given and HR improved.     Labs in ED showed:  WBC 8.3  Cr 1.83 (baseline 1.0)  Lactate 2.9  Troponin 41  Lymph % 2  Ferritin 1484     CXR

## 2020-04-12 NOTE — PROGRESS NOTES
tip in the   distal midtrachea.  There is a gastric tube and the tip is not visualized.           Impression   Chronic pulmonary change with scattered airspace opacities most consistent   with pneumonia that is worse on the right compared to the left.       Support tubes as described above.           Assessment:     1. Acute kidney injury due to hypoxic ATN other possibility include interstitial nephritis due to ongoing viral infection. Baseline 1.0  2. Multifocal pneumonia  3. Metabolic acidosis improved  4. Hypokalemia she is receiving potassium supplements patient has a normal magnesium level  5. Mild pulmonary congestion  6. Hx of polymyalgia rheumatica and giant cell arteritis   7. Hypokalemia secondary to diuresis    Plan:   1. Continue Lasix 40mg IV daily. 2. Daily BMP  3. Will continue to follow. Nutrition   Renal Diet/TF      Jazlyn Strickland CNP  [facility]    Attending Physician Statement  I have discussed the care of Charles Pérez, including pertinent history and exam findings with the resident/fellow. I have reviewed the key elements of all parts of the encounter with the resident/fellow. I have seen and examined the patient with the resident/fellow. I agree with the assessment and plan and status of the problem list as documented. Additionally, I recommend recheck a potassium level to assess the adequacy of the potassium supplement.     Lukas Valencia MD  Nephrology Attending Physician  [facility]

## 2020-04-12 NOTE — PROGRESS NOTES
Attempted to call [facility] for an update there was no answer. Message was left to call back.

## 2020-04-13 NOTE — PROGRESS NOTES
Nutrition Assessment (Enteral Nutrition)    Type and Reason for Visit: Reassess    Nutrition Recommendations: Suggest modifying TF to Fluid Restricted formula: 200 mL bolus, TID to provide 1200 kcals, 50 gm protein per day. Nutrition Assessment: Pt now proning x 16 hours. FMS in place. Previously tolerating bolus feeds (has not received one yet today per RN). Nutrition Risk Level: High    Nutrition Needs:  · Estimated Daily Total Kcal: 8060-8924 kcal/day  · Estimated Daily Protein (g): 65-90 gm pro/day    Nutrition Diagnosis:   · Problem: Inadequate oral intake  · Etiology: related to Impaired respiratory function-inability to consume food     Signs and symptoms:  as evidenced by NPO status due to medical condition, Nutrition support - EN    Objective Information:  · Nutrition-Focused Physical Findings: Proning x 16 hours; FMS in place  · Wound Type: None  · Current Nutrition Therapies:  · Oral Diet Orders: NPO   · Tube Feeding (TF) Orders:   · Feeding Route: Orogastric  · Formula: Semi-elemental  · Volume (ml/day): 1000 mL/day  · Duration: Bolus(250 mL x4)  · Water Flushes: 60 mL, 4x daily  · Current TF & Flush Orders Provides: 250 mL bolus of Semi elemental, 4x daily = 1200 kcals, 75 gm protein  · Anthropometric Measures:  · Ht: 5' (152.4 cm)   · Current Body Wt: 146 lb 3.2 oz (66.3 kg)  · Admission Body Wt: 146 lb 3.2 oz (66.3 kg)  · Ideal Body Wt: 100 lb (45.4 kg), % Ideal Body 146%  · BMI Classification: BMI 25.0 - 29.9 Overweight    Nutrition Interventions:   Modify current Tube Feeding  Continued Inpatient Monitoring, Education Not Indicated    Nutrition Evaluation:   · Evaluation: Goal achieved   · Goals: Start of nutrition within 48 hours. Meet % of estimated nutrition needs.    · Monitoring: TF Intake, TF Tolerance, Diarrhea, Weight, Pertinent Labs      Electronically signed by Ta Gil, MS, RD, LD on 4/13/20 at 1:23 PM EDT    Contact Number: [phone]

## 2020-04-13 NOTE — PROGRESS NOTES
congestion  6. Hx of polymyalgia rheumatica and giant cell arteritis   7. Hypokalemia secondary to diuresis-improved. 8.  Ventilator dependent respiratory failure. Plan:   1. Continue Lasix 40 mg IV daily. 2.  Covid treatment as per ID.  3.  His renal function is improved and urine output good, nephrology will sign off. Please call with any questions. Patient's nurse was updated. Nutrition   Please ensure that patient is on a renal diet/TF. Avoid nephrotoxic drugs/contrast exposure. We will continue to follow along with you. Nathan Garcia MD  [facility]     This note is created with the assistance of a speech-recognition program. While intending to generate a document that actually reflects the content of the visit, no guarantees can be provided that every mistake has been identified and corrected by editing.

## 2020-04-13 NOTE — PROGRESS NOTES
Component Value Date    SEDRATE 35 (H) 03/10/2012       No results for input(s): PROCAL in the last 72 hours. Imaging Studies:   ONE XRAY VIEW OF THE CHEST 4/11/2020 12:06 pm  Impression   Chronic pulmonary change with scattered airspace opacities most consistent   with pneumonia that is worse on the right compared to the left.       Support tubes as described above. Cultures:     Culture, Respiratory [[id number]] Collected: 04/08/20 1507   Order Status: Completed Specimen: Endotracheal Updated: 04/11/20 1124    Specimen Description . ENDOTRACHEAL    Special Requests NOT REPORTED    Direct Exam >10, <25 NEUTROPHILS/LPF     < 10 EPITHELIAL CELLS/LPF     NO SIGNIFICANT PATHOGENS SEEN    Culture NORMAL RESPIRATORY CARYN LIGHT GROWTH   Culture, Blood 1 [[id number]] Collected: 04/08/20 1211   Order Status: Completed Specimen: Blood Updated: 04/11/20 0829    Specimen Description . BLOOD    Special Requests A LINE    Culture NO GROWTH 3 DAYS   Culture, Blood 1 [[id number]] Collected: 04/08/20 1211   Order Status: Completed Specimen: Blood Updated: 04/11/20 0829    Specimen Description . BLOOD    Special Requests CENTRAL LINE    Culture NO GROWTH 3 DAYS   Culture, Blood 1 [[id number]] Collected: 04/08/20 0321   Order Status: Completed Specimen: Blood Updated: 04/11/20 0829    Specimen Description . BLOOD    Special Requests LT WRIST 3ML    Culture NO GROWTH 3 DAYS   Culture, Blood 1 [[id number]] Collected: 04/08/20 0311   Order Status: Completed Specimen: Blood Updated: 04/11/20 0829    Specimen Description . BLOOD    Special Requests RT AC 10ML    Culture NO GROWTH 3 DAYS   Culture, Urine [[id number]] Collected: 04/08/20 1146   Order Status: Completed Specimen: Urine, indwelling catheter Updated: 04/09/20 1059    Specimen Description . INDWELLING CATH URINE    Special Requests NOT REPORTED    Culture NO GROWTH   MRSA DNA Probe, Nasal [[id number]] Collected: 04/08/20 1224   Order Status: Completed Specimen: Nasal Updated:

## 2020-04-14 NOTE — PROGRESS NOTES
8.00 pm-Received a call back from daughter Franck Kong. Initially she was called by critical care physician regarding hemodialysis this morning. Family wanted to go ahead with hemodialysis if required. Discussed in details about CODE STATUS of patient. Explained various forms of CODE STATUS to the patient. Explained in details that patient's condition is critical.      8.30 pm-Vinay discussed with her siblings and called back for change of CODE STATUS. 4 out of 5 children want to change CODE STATUS to DNR CCA. Consulted palliative care as well. No formal POA currently.     Chemo Mcpherson MD      Department of Internal Medicine  [facility]         4/14/2020, 8:05 PM

## 2020-04-14 NOTE — PROGRESS NOTES
Urine(mL/kg/hr) 350(0.7) 425(0.8) 245 1020   Shift Total(mL/kg) 350(5.3) 425(6.4) 245(3.7) 1020(15.4)   Weight (kg) 66.4 66.4 66.4 66.4     Wt Readings from Last 3 Encounters:   04/11/20 146 lb 6.2 oz (66.4 kg)   01/02/20 140 lb (63.5 kg)   10/20/19 145 lb (65.8 kg)     Body mass index is 28.59 kg/m². PHYSICAL EXAM:  · General appearance:Intubated and sedated   · HEENT: Head: Normocephalic, no lesions, without obvious abnormality. · Lungs: clear to auscultation bilaterally  · Heart: regular rate and rhythm, S1, S2 normal, no murmur  · Abdomen: soft, non-tender; bowel sounds normal; no masses,  no organomegaly  · Extremities: left foot bruised   · Neurological: Intubated and sedated, does not follow commands.   Paralyzed  · Eye no icterus no redness    Any additional physical findings:      MEDICATIONS:  Scheduled Meds:   hydrocortisone sodium succinate PF  50 mg Intravenous Q12H    norepinephrine        labetalol  10 mg Intravenous Once    potassium chloride  20 mEq Oral BID    furosemide  40 mg Intravenous Daily    clopidogrel  75 mg Oral Daily    famotidine (PEPCID) injection  20 mg Intravenous Daily    sodium chloride flush  10 mL Intravenous 2 times per day    heparin (porcine)  5,000 Units Subcutaneous 3 times per day    aspirin  81 mg Oral Daily    atorvastatin  20 mg Oral Daily     Continuous Infusions:   norepinephrine 30 mcg/min (04/13/20 1901)    vasopressin (Septic Shock) infusion      vecuronium (NORCURON) infusion 0.5 mcg/kg/min (04/13/20 1839)    sodium chloride 10 mL/hr at 04/11/20 0845    midazolam 4 mg/hr (04/13/20 0252)    fentaNYL 100 mcg/hr (04/13/20 1839)     PRN Meds:   potassium chloride, 10 mEq, PRN  prochlorperazine, 10 mg, Q6H PRN  sodium chloride flush, 10 mL, PRN  acetaminophen, 650 mg, Q6H PRN    Or  acetaminophen, 650 mg, Q6H PRN  polyethylene glycol, 17 g, Daily PRN        SUPPORT DEVICES: [x] Ventilator [] BIPAP  [] Nasal Cannula [] Room Air    VENT SETTINGS 04/08/2020    PHUR 5.5 04/08/2020    WBCUA None 04/08/2020    RBCUA 2 TO 5 04/08/2020    MUCUS NOT REPORTED 04/08/2020    TRICHOMONAS NOT REPORTED 04/08/2020    YEAST NOT REPORTED 04/08/2020    BACTERIA NOT REPORTED 04/08/2020    SPECGRAV 1.019 04/08/2020    LEUKOCYTESUR NEGATIVE 04/08/2020    UROBILINOGEN Normal 04/08/2020    BILIRUBINUR NEGATIVE 04/08/2020    GLUCOSEU NEGATIVE 04/08/2020    KETUA NEGATIVE 04/08/2020    AMORPHOUS NOT REPORTED 04/08/2020       CARDIAC ENZYMES: No results for input(s): CKMB, CKMBINDEX, TROPONINI in the last 72 hours. Invalid input(s): CKTOTAL;3  BNP: No results for input(s): BNP in the last 72 hours. LFTS  Recent Labs     04/11/20  0421 04/12/20  0509 04/13/20  0542   ALKPHOS 81 87 82   ALT 15 16 15   AST 74* 68* 50*   BILITOT 0.34 0.37 0.39   LABALBU 2.1* 2.0* 2.1*       AMYLASE/LIPASE/AMMONIA  No results for input(s): AMYLASE, LIPASE, AMMONIA in the last 72 hours. Last 3 Blood Glucose:   Recent Labs     04/11/20  0421 04/12/20  0509 04/13/20  0542   GLUCOSE 94 144* 103*      HgBA1c:  No results found for: LABA1C      TSH:  No results found for: TSH  ANEMIA STUDIES  No results for input(s): LABIRON, TIBC, FERRITIN, [id number], FOLATE, OCCULTBLD in the last 72 hours.             Cultures during this admission:     Blood cultures:                 [] None drawn      [x] Negative             []  Positive (Details:  )  Urine Culture:                   [] None drawn      [x] Negative             []  Positive (Details:  )  Sputum Culture:               [] None drawn       [x] Negative             []  Positive (Details:  )   Endotracheal aspirate:     [] None drawn       [x] Negative             []  Positive (Details:  )           ASSESSMENT:     Principal Problem:    Pneumonia due to severe acute respiratory syndrome coronavirus 2 (SARS-CoV-2)  Active Problems:    Respiratory distress    Current chronic use of systemic steroids    Acute respiratory failure with hypoxia ([facility] [address]

## 2020-04-14 NOTE — CONSULTS
Yes Heather Michaud MD   amLODIPine (NORVASC) 5 MG tablet Take 5 mg by mouth daily   Yes Historical Provider, MD   meloxicam (MOBIC) 7.5 MG tablet Take 7.5 mg by mouth daily   Yes Historical Provider, MD   HYDROcodone-acetaminophen (NORCO) 5-325 MG per tablet Take 1 tablet by mouth every 6 hours as needed for Pain. .   Yes Historical Provider, MD   pantoprazole (PROTONIX) 40 MG tablet Take 40 mg by mouth daily  9/11/17  Yes Historical Provider, MD   atorvastatin (LIPITOR) 20 MG tablet Take 20 mg by mouth   Yes Historical Provider, MD   clopidogrel (PLAVIX) 75 MG tablet Take 75 mg by mouth   Yes Historical Provider, MD   gemfibrozil (LOPID) 600 MG tablet Take 600 mg by mouth 2 times daily    Yes Historical Provider, MD   linaclotide (LINZESS) 145 MCG capsule Take 1 capsule by mouth every morning (before breakfast)  Patient not taking: Reported on 1/2/2020 10/22/18   Heather Michaud MD   vitamin B-12 100 MCG tablet Take 1 tablet by mouth daily 10/22/18   Heather Michaud MD   ranitidine (ZANTAC) 150 MG tablet Take 150 mg by mouth    Historical Provider, MD      Current Facility-Administered Medications: insulin regular (HUMULIN R;NOVOLIN R) injection 10 Units, 10 Units, Intravenous, Once **AND** [COMPLETED] dextrose 50 % IV solution, 25 g, Intravenous, Once  hydrocortisone sodium succinate PF (SOLU-CORTEF) injection 100 mg, 100 mg, Intravenous, Q8H  0.9 % sodium chloride bolus, 500 mL, Intravenous, Once  Calcium Gluconate-NaCl 2-0.675 GM/100ML-% 2 g IVPB, 2 g, Intravenous, Once  insulin regular (HUMULIN R;NOVOLIN R) injection 10 Units, 10 Units, Intravenous, Once  dextrose 50 % IV solution, 10 mL, Intravenous, Once  sodium zirconium cyclosilicate (LOKELMA) oral suspension 10 g, 10 g, Oral, TID  norepinephrine (LEVOPHED) 16 mg in dextrose 5% 250 mL infusion, 2 mcg/min, Intravenous, Continuous  vasopressin 20 Units in dextrose 5 % 100 mL infusion, 0.02 Units/min, Intravenous, Continuous  potassium chloride 10 mEq/100 mL IVPB (Peripheral Line), 10 mEq, Intravenous, PRN  labetalol (NORMODYNE;TRANDATE) injection syringe 10 mg, 10 mg, Intravenous, Once  vecuronium (NORCURON) 100 mg in sodium chloride 0.9 % 100 mL infusion, 0.5 mcg/kg/min, Intravenous, Continuous  0.9 % sodium chloride infusion, , Intravenous, Continuous  prochlorperazine (COMPAZINE) injection 10 mg, 10 mg, Intravenous, Q6H PRN  clopidogrel (PLAVIX) tablet 75 mg, 75 mg, Oral, Daily  midazolam (VERSED) 1 mg/mL in D5W infusion, 1 mg/hr, Intravenous, Continuous  famotidine (PEPCID) injection 20 mg, 20 mg, Intravenous, Daily  sodium chloride flush 0.9 % injection 10 mL, 10 mL, Intravenous, 2 times per day  sodium chloride flush 0.9 % injection 10 mL, 10 mL, Intravenous, PRN  acetaminophen (TYLENOL) tablet 650 mg, 650 mg, Oral, Q6H PRN **OR** acetaminophen (TYLENOL) suppository 650 mg, 650 mg, Rectal, Q6H PRN  polyethylene glycol (GLYCOLAX) packet 17 g, 17 g, Oral, Daily PRN  heparin (porcine) injection 5,000 Units, 5,000 Units, Subcutaneous, 3 times per day  aspirin EC tablet 81 mg, 81 mg, Oral, Daily  atorvastatin (LIPITOR) tablet 20 mg, 20 mg, Oral, Daily  fentaNYL 20 mcg/mL Infusion, 25 mcg/hr, Intravenous, Continuous    Allergies:  Adhesive tape; Benadryl [diphenhydramine]; Shellfish-derived products; Atarax [hydroxyzine]; Bactrim [sulfamethoxazole-trimethoprim]; Codeine; Dicyclomine; Hydroxyzine hcl; Other; Penicillins; Pregabalin; and Sulfa antibiotics    Social History:   reports that she has never smoked. She has never used smokeless tobacco. She reports that she does not drink alcohol. Family History: family history includes Asthma in her daughter; Diabetes in her son; Heart Attack in her mother; Prostate Cancer in her brother. No h/o sudden cardiac death. No for premature CAD    REVIEW OF SYSTEMS:    · Unable to obtain.       PHYSICAL EXAM:      BP (!) 130/51   Pulse 109   Temp 100.4 °F (38 °C) (Core)   Resp 11   Ht 5' (1.524 m)   Wt 146 lb 6.2 oz (66.4

## 2020-04-14 NOTE — PROGRESS NOTES
CL 96*  --  98 98  --    CO2 27  --  29 24  --    BUN 44*  --  43* 55*  --    CREATININE 1.27*  --  1.03* 1.80*  --    MG 1.9  --   --   --   --     < > = values in this interval not displayed. Hepatic Function Panel:   Recent Labs     04/13/20  0542 04/14/20  0515   PROT 5.9* 6.7   LABALBU 2.1* 2.0*   BILITOT 0.39 0.35   ALKPHOS 82 85   ALT 15 82*   AST 50* 219*     No results for input(s): VANCOTROUGH in the last 72 hours. Lab Results   Component Value Date    .9 (H) 04/08/2020     Lab Results   Component Value Date    SEDRATE 35 (H) 03/10/2012       No results for input(s): PROCAL in the last 72 hours. Imaging Studies:   ONE XRAY VIEW OF THE CHEST 4/11/2020 12:06 pm  Impression   Chronic pulmonary change with scattered airspace opacities most consistent   with pneumonia that is worse on the right compared to the left.       Support tubes as described above. Cultures:     Culture, Respiratory [[id number]] Collected: 04/08/20 1507   Order Status: Completed Specimen: Endotracheal Updated: 04/11/20 1124    Specimen Description . ENDOTRACHEAL    Special Requests NOT REPORTED    Direct Exam >10, <25 NEUTROPHILS/LPF     < 10 EPITHELIAL CELLS/LPF     NO SIGNIFICANT PATHOGENS SEEN    Culture NORMAL RESPIRATORY CARYN LIGHT GROWTH   Culture, Blood 1 [[id number]] Collected: 04/08/20 1211   Order Status: Completed Specimen: Blood Updated: 04/11/20 0829    Specimen Description . BLOOD    Special Requests A LINE    Culture NO GROWTH 3 DAYS   Culture, Blood 1 [[id number]] Collected: 04/08/20 1211   Order Status: Completed Specimen: Blood Updated: 04/11/20 0829    Specimen Description . BLOOD    Special Requests CENTRAL LINE    Culture NO GROWTH 3 DAYS   Culture, Blood 1 [[id number]] Collected: 04/08/20 0321   Order Status: Completed Specimen: Blood Updated: 04/11/20 0829    Specimen Description . BLOOD    Special Requests LT WRIST 3ML    Culture NO GROWTH 3 DAYS   Culture, Blood 1 [[id number]] Collected: 04/08/20 0306   Order Status: Completed Specimen: Blood Updated: 04/11/20 0829    Specimen Description . BLOOD    Special Requests RT AC 10ML    Culture NO GROWTH 3 DAYS   Culture, Urine [[id number]] Collected: 04/08/20 1146   Order Status: Completed Specimen: Urine, indwelling catheter Updated: 04/09/20 1059    Specimen Description . INDWELLING CATH URINE    Special Requests NOT REPORTED    Culture NO GROWTH   MRSA DNA Probe, Nasal [[id number]] Collected: 04/08/20 1224   Order Status: Completed Specimen: Nasal Updated: 04/08/20 2320    Specimen Description . NASAL SWAB    MRSA, DNA, Nasal NEGATIVE:  MRSA DNA not detected by nucleic acid amplification. Medications:      insulin regular  10 Units Intravenous Once    hydrocortisone sodium succinate PF  100 mg Intravenous Q8H    labetalol  10 mg Intravenous Once    clopidogrel  75 mg Oral Daily    famotidine (PEPCID) injection  20 mg Intravenous Daily    sodium chloride flush  10 mL Intravenous 2 times per day    heparin (porcine)  5,000 Units Subcutaneous 3 times per day    aspirin  81 mg Oral Daily    atorvastatin  20 mg Oral Daily     Medical Decision Vyxzdp-Rauivhuu-Lcyjj:        Medical Decision Making-Other:      Note:  · Labs, medications, radiologic studies were reviewed with personal review of films  · Moderate Large amounts of data were reviewed  · Discussed with nursing Staff, Discharge planner  · Infection Control and Prevention measures reviewed  · All prior entries were reviewed  · Administer medications as ordered  · Prognosis: Very Guarded  · Discharge planning reviewed  · Follow up as outpatient.     Thank you for allowing us to participate in the care of this patient. Please call with questions. Rene Vences MD.    ATTESTATION:    I have discussed the case, including pertinent history and exam findings with the residents and students. I have seen and examined the patient and the key elements of the encounter have been performed by me.  I was present

## 2020-04-15 NOTE — PROGRESS NOTES
by mouth daily   IRON PO, Take 325 mg by mouth daily   CRANBERRY PO, Take by mouth  folic acid (FOLVITE) 1 MG tablet, Take 1 tablet by mouth daily  amLODIPine (NORVASC) 5 MG tablet, Take 5 mg by mouth daily  meloxicam (MOBIC) 7.5 MG tablet, Take 7.5 mg by mouth daily  HYDROcodone-acetaminophen (NORCO) 5-325 MG per tablet, Take 1 tablet by mouth every 6 hours as needed for Pain. .  pantoprazole (PROTONIX) 40 MG tablet, Take 40 mg by mouth daily   atorvastatin (LIPITOR) 20 MG tablet, Take 20 mg by mouth  clopidogrel (PLAVIX) 75 MG tablet, Take 75 mg by mouth  gemfibrozil (LOPID) 600 MG tablet, Take 600 mg by mouth 2 times daily   linaclotide (LINZESS) 145 MCG capsule, Take 1 capsule by mouth every morning (before breakfast) (Patient not taking: Reported on 1/2/2020)  vitamin B-12 100 MCG tablet, Take 1 tablet by mouth daily  ranitidine (ZANTAC) 150 MG tablet, Take 150 mg by mouth  [DISCONTINUED] oxybutynin (DITROPAN) 5 MG tablet, Take 5 mg by mouth    Current Medications:     Scheduled Meds:    insulin lispro  0-6 Units Subcutaneous TID WC    insulin lispro  0-3 Units Subcutaneous Nightly    furosemide  40 mg Intravenous Once    fludrocortisone  0.1 mg Oral Once    hydrocortisone sodium succinate PF  100 mg Intravenous Q8H    labetalol  10 mg Intravenous Once    famotidine (PEPCID) injection  20 mg Intravenous Daily    sodium chloride flush  10 mL Intravenous 2 times per day    aspirin  81 mg Oral Daily     Continuous Infusions:    heparin (porcine) 14 Units/kg/hr (04/15/20 0007)    dextrose      norepinephrine 10 mcg/kg/min (04/15/20 1013)    vasopressin (Septic Shock) infusion Stopped (04/15/20 0159)    vecuronium (NORCURON) infusion 0.5 mcg/kg/min (04/13/20 7099)    sodium chloride 10 mL/hr at 04/13/20 0300    midazolam 4 mg/hr (04/15/20 0314)    fentaNYL 60 mcg/hr (04/15/20 0314)     PRN Meds:  lubrifresh P.M., heparin (porcine), heparin (porcine), glucose, dextrose, glucagon (rDNA), dextrose, discussed the case with patient's daughter Christiana Lopes and explained her that if potassium continues to be refractory then the patient will require hemodialysis. Risks vs benefits were discussed and she is in agreement with getting the procedure done if required. 6.  Recheck potassium today afternoon if still elevated will do hemodialysis. Orders will be confirmed with hemodialysis nurse. 7.  Covid treatment as per ID.  8.  Will follow. Case was discussed with critical care physician and patient's nurse was updated. Nutrition   Please ensure that patient is on a renal diet/TF. Avoid nephrotoxic drugs/contrast exposure. We will continue to follow along with you. Nathan Hunt MD  [facility]     This note is created with the assistance of a speech-recognition program. While intending to generate a document that actually reflects the content of the visit, no guarantees can be provided that every mistake has been identified and corrected by editing.

## 2020-04-15 NOTE — PROGRESS NOTES
Nutrition Assessment (Enteral Nutrition)    Type and Reason for Visit: Reassess    Nutrition Recommendations:   - Continue bolus TF of Fluid Restricted formula: 200 mL bolus, TID to provide 1200 kcals, 50 gm protein per day. - Monitor TF tolerance, labs, bowel function, and plan of care. Nutrition Assessment: Pt remains intubated. Proning x 16 hrs - noted no plans to prone overnight. Tolerating bolus feeds - recieved one feed this morning. Noted discussion of start of dialysis. Malnutrition Assessment:  · Malnutrition Status:  At risk for malnutrition    Nutrition Risk Level: High    Nutrition Needs:  · Estimated Daily Total Kcal: 1835-7962 kcal/day  · Estimated Daily Protein (g): 65-90 gm pro/day    Nutrition Diagnosis:   · Problem: Inadequate oral intake  · Etiology: related to Impaired respiratory function-inability to consume food     Signs and symptoms:  as evidenced by NPO status due to medical condition, Nutrition support - EN    Objective Information:  · Nutrition-Focused Physical Findings: Proning x 16 hours; FMS in place  · Wound Type: None  · Current Nutrition Therapies:  · Oral Diet Orders: NPO   · Tube Feeding (TF) Orders:   · Feeding Route: Orogastric  · Formula: Semi-elemental, Fluid Restricted  · Volume (ml/day): 600 mL/day  · Duration: Bolus(200 mL x 3 per day)  · Water Flushes: 60 mL x 3 per day   · Current TF & Flush Orders Provides: 200 mL bolus of TwoCal x 3 per day = 1200 kcals, 75 gm pro/day  · Goal TF & Flush Orders Provides: 1200 kcals, 75 gm pro/day  · Anthropometric Measures:  · Ht: 5' (152.4 cm)   · Current Body Wt: 149 lb 14.6 oz (68 kg)  · Admission Body Wt: 146 lb 3.2 oz (66.3 kg)  · Ideal Body Wt: 100 lb (45.4 kg), % Ideal Body 146%  · BMI Classification: BMI 25.0 - 29.9 Overweight    Nutrition Interventions:   Continue current Tube Feeding  Continued Inpatient Monitoring, Education Not Indicated    Nutrition Evaluation:   · Evaluation: Progressing toward goals · Goals: Start of nutrition within 48 hours. Meet % of estimated nutrition needs.    · Monitoring: TF Intake, TF Tolerance, Skin Integrity, I&O, Weight, Pertinent Labs, Monitor Hemodynamic Status, Monitor Bowel Function    Electronically signed by Digna Stephenson RD, LD on 4/15/20 at 2:38 PM EDT    Contact Number: [phone]

## 2020-04-15 NOTE — PROGRESS NOTES
[facility]  Progress Note    Marco Lao  MRN: [id number]  Date: 4/15/2020    Reason for F/U :   2019 novel coronavirus infection    Impression :   1. Respiratory failure on ventilator   2. Polymyalgia rheumatica on chronic steroid use  3. Chronic compression fractures  4. Prolonged QTc interval   5. SARS-CoV-2 pneumonia- COVID 19    Recommendations:   · Monitor off antibiotics   · Completed Plaquenil and Azithromycin course. Stop date 4-13-20  · Chest x-ray today continues to show bilateral airspace disease. · Culture data all remain negative thus far  · Continue supportive care      Infection Control Recommendations:   Droplet plus precautions    Discharge Planning:   Estimated Length of IV antimicrobials: 4/13/2020  Patient will need Midline Catheter Insertion/ PICC line Insertion: No  Patient will need: Home IV , [place],  SNF,  LTAC: Undetermined  Patient willneed outpatient wound care: No    MedicalDecision making / Summary of Stay:   Lisa Soares a 82y.o.-year-old  female who was initially admitted on 4/8/2020. Patient seen at the request of Jordyn Shanks HISTORY:      Pt is an 83 yo woman who presented to the ED with SOB and fever to 103. 1. He SaO2 was in the 50's and she was tachycardic.  Per the notes she lives with 2 people who are COVID positive and currently hospitalized.      Her PMH includes PMR and temporal arteritis, she is on chronic steroid therapy. 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      She was seen at [facility] ED on 4-1 with mental status changes after taking 1/2 of an ativan tablet. She also reported that she had vomited x 2.  At that time a CT brain and chest were both negative for acute processes.      Her mental status improved, and she was discharged home.      In the ED she was placed on oxygen with improvement in her SaO2.   1L fluid bolus was given and HR improved.     Labs in ED showed:  WBC 8.3  Cr 1.83 (baseline 1.0)  Lactate 2.9  Troponin 41  Lymph % 2  Ferritin 1484     CXR showed:  Multifocal bilateral heterogeneous pulmonary opacities with consolidation in   the right mid and lower lung zones.  Differential considerations include   multifocal pneumonia including atypical or viral pneumonia and asymmetric   pulmonary edema.       Cardiomegaly. She was given a dose of Levaquin 750 mg x 1 and admitted to the [facility] step down unit for further care. Her oxygen requirement increased and she was transferred to the ICU and intubated on 4-8. CURRENT EXAMINATION: 4/15/2020     Patient was seen and evaluated in the ICU. Afebrile  VS stable  On norepinephrine    No overnight issues noted     She remains intubated, sedated and paralyzed. Overall requiring more respiratory support. FiO2 40--> 50-->70 %   PEEP  5-->14. Creatinine elevated    Completed Azithromycin and Plaquenil course. stop date 4-13     CXR 4-14-20  ET tube in good position.  Continued chronic interstitial markings with   slight improvement in patchy focal infiltrates. NEWS Score: 0-4 Low risk group; 5-6: Medium risk group; 7 or above: High risk group  Parameters 3 2 1 0 1 2 3   Age       < 65     ? 65   RR ? 8   9-11 12-20   21-24 ? 25   O2 Sats ?  91 92-93 94-95 ? 96         Suppl O2   Yes   No         SBP ? 90  101-110 111-219     ? 220   HR ? 40   41-50 51-90  111-130 ? 131   Consciousness       Alert     Drowsiness, lethargy, or confusion   Temperature ? 35.0 C (95.0 F)   35.1-36.0 C 95.1-96.9 F 36.1-38.0 C 97.0-100.4 F 38.1-39.0 C 100.5-102.3 F ? 39.1 C ? 102.4 F        NEWS Score:  · 4-8-20: 11 high risk  · 4-9-20: 14  · 4-10-20: 10  · 4-11-20: 8  · 4-12-20: 9  · 4-13-20: 12  · 4-14-20: 11  · 4-15-20: 12    Labs, X rays reviewed: 4/15/2020    BUN: 38->43->42->44->43->55-->69  Cr: 1.83-->1.75->1.57->1.27->1.03->1.80-->1.75     .9    ProBNP 953  Ferritin 1484     WBC: 8.3->8.0->6.7->10.2-->5.2  Hb: 12.5->10.7->8.8->8.4->8.6->11.1-->9.2  Plat:  177->221->143->137->139->178-->131    Cultures:  Urine:  · 4-8 no growth   Blood:  · 4-8 x2 no growth to date   Sputum :    Wound:     Viral PCR negative  COVID positive   Nasal MRSA negative   Strept/Legionella negative     Imaging:  CXR 4-14-20    CXR 4-11-20    CXR 4-8-20      Discussed with RN.     I have personally reviewed the past medical history, past surgical history, medications, social history, and family history, and I have updated the database accordingly. Review of systems:     Review of Systems   Unable to perform ROS: Intubated     Physical Examination :     General Appearance: Sedated on ventilator. Head:  Normocephalic, no trauma  Eyes: Pupils equal, round, reactive to light, sclera anicteric; conjunctivae pink. No embolic phenomena. ENT: Oropharynx clear, without erythema, exudate, or thrush. . Dentition in good repair. Intubated. Neck:Supple, without lymphadenopathy. Thyroid normal, No bruits. Pulmonary/Chest: Coarse distant sounds  Cardiovascular: Regular rate and rhythm without murmurs, rubs, or gallops. Abdomen: Soft, non tender. Bowel sounds normal. No organomegaly  All four Extremities: No cyanosis, clubbing, edema, or effusions. Neurologic: No gross sensory or motor deficits. Skin: Warm and dry with good turgor. No signs of peripheral arterial or venous insufficiency. No ulcerations. No open wounds.       Laboratory data:   I have independently reviewed the followinglabs:  CBC with Differential:   Recent Labs     04/14/20  0515 04/14/20  1638   WBC 10.3 5.2   HGB 11.1* 9.2*   HCT 35.5* 29.0*    See Reflexed IPF Result   LYMPHOPCT 5*  --    MONOPCT 1  --      BMP:   Recent Labs     04/14/20  0515  04/15/20  0543 04/15/20  1419     --  136  --    K 6.2*   < > 5.8* 5.5*   CL 98  --  95*  --    CO2 24  --  26  --    BUN 55*  --  69*  --    CREATININE 1.80*  --  1.75*  --     < > =

## 2020-04-15 NOTE — PROGRESS NOTES
[id number]  Equipment Changed: HME  Vent Type: Servo i  Vent Mode: PRVC  Time high: 5 cmH2O  Pressure High: 30 cmH2O  Pressure low: 0.5 cmH2O  Vt Ordered: 330 mL  Pressure Ordered: 0  Rate Set: 28 bmp  Pressure Support: 2 cmH20  FiO2 : 90 %  Sensitivity: 2  PEEP/CPAP: 15  I Time/ I Time %: 0.9 s  Humidification Source: HME  Additional Respiratory  Assessments  Pulse: 60  Resp: 24  SpO2: 100 %  End Tidal CO2: 42 (%)  Position: Semi-Duenas's  Humidification Source: HME  Oral Care Completed?: Yes  Oral Care: Mouth suctioned, Mouth swabbed, Lip moisturizer applied  Subglottic Suction Done?: No  Skin barrier applied: No      Laboratory findings:    Complete Blood Count:   Recent Labs     04/14/20  0515 04/14/20  1638   WBC 10.3 5.2   HGB 11.1* 9.2*   HCT 35.5* 29.0*    See Reflexed IPF Result        Last 3 Blood Glucose:   Recent Labs     04/13/20  0542 04/14/20  0515   GLUCOSE 103* 163*        PT/INR:    Lab Results   Component Value Date    PROTIME 10.3 04/08/2020    INR 1.0 04/08/2020     PTT:    Lab Results   Component Value Date    APTT 49.9 04/15/2020       Comprehensive Metabolic Profile:   Recent Labs     04/13/20  0542 04/14/20  0515 04/14/20  1140 04/14/20  1638 04/14/20  2247 04/15/20  0543    137  --   --   --   --    K 4.0 6.2* 6.3* 5.7* 5.4*  --    CL 98 98  --   --   --   --    CO2 29 24  --   --   --   --    BUN 43* 55*  --   --   --   --    CREATININE 1.03* 1.80*  --   --   --   --    GLUCOSE 103* 163*  --   --   --   --    CALCIUM 8.7 9.0  --   --   --   --    PROT 5.9* 6.7  --   --   --  5.8*   LABALBU 2.1* 2.0*  --   --   --  1.8*   BILITOT 0.39 0.35  --   --   --  0.45   ALKPHOS 82 85  --   --   --  77   AST 50* 219*  --   --   --  1,029*   ALT 15 82*  --   --   --  405*      Magnesium:   Lab Results   Component Value Date    MG 1.9 04/12/2020     Phosphorus: No results found for: PHOS  Ionized Calcium: No results found for: CAION     Troponin: No results for input(s): TROPONINI in the last midtrachea. There is a gastric tube and the tip is not visualized. Chronic pulmonary change with scattered airspace opacities most consistent with pneumonia that is worse on the right compared to the left. Support tubes as described above. Xr Chest Portable    Result Date: 4/8/2020  EXAMINATION: ONE XRAY VIEW OF THE CHEST 4/8/2020 1:38 pm COMPARISON: 04/08/2020 at 5 a.m. HISTORY: ORDERING SYSTEM PROVIDED HISTORY: Intubation TECHNOLOGIST PROVIDED HISTORY: Intubation Reason for Exam: port upr Follow-up. FINDINGS: Monitor wires overlie the chest. The patient has been intubated. The tip of the endotracheal tube is at the T5 level. An NG tube is present with the tip in the body of the stomach. The proximal port is in the fundus of the stomach. There are bilateral airspace changes, right worse than left. No significant pleural fluid. New endotracheal tube with the tip at the T5 level, approximately 1.8 cm above the duran. NG tube with the tip in the body of the stomach. The proximal port is in the fundus of the stomach. Stable bilateral patchy airspace changes. Xr Chest Portable    Result Date: 4/8/2020  EXAMINATION: ONE XRAY VIEW OF THE CHEST 4/8/2020 4:16 am COMPARISON: October 19, 2018. HISTORY: ORDERING SYSTEM PROVIDED HISTORY: hypoxia, cough TECHNOLOGIST PROVIDED HISTORY: hypoxia, cough Reason for Exam: portable upright/ hypoxia/ SOB and cough Acuity: Acute Type of Exam: Initial FINDINGS: Frontal portable view of the chest.  Multifocal bilateral heterogeneous pulmonary opacities with consolidation in the right mid and lower lung zones. No pleural effusion or pneumothorax. Cardiomegaly. Atherosclerotic thoracic aorta. Multilevel degenerative disc disease. Multilevel vertebroplasty/kyphoplasty. Right upper quadrant cholecystectomy clips. Osteopenia. Multifocal bilateral heterogeneous pulmonary opacities with consolidation in the right mid and lower lung zones.   Differential considerations

## 2020-04-16 NOTE — PROGRESS NOTES
[facility]  Progress Note    Michael Schultz  MRN: [id number]  Date: 4/16/2020    Reason for F/U :   2019 novel coronavirus infection    Impression :   1. Respiratory failure on ventilator   2. Polymyalgia rheumatica on chronic steroid use  3. Chronic compression fractures  4. Prolonged QTc interval   5. SARS-CoV-2 pneumonia- COVID 19    Recommendations:   · Monitor off antibiotics   · Completed Plaquenil and Azithromycin course. Stop date 4-13-20  · Culture data all remain negative thus far  · Continue supportive care      Infection Control Recommendations:   Droplet plus precautions    Discharge Planning:   Estimated Length of IV antimicrobials: 4/13/2020  Patient will need Midline Catheter Insertion/ PICC line Insertion: No  Patient will need: Home IV , [place],  SNF,  LTAC: Undetermined  Patient willneed outpatient wound care: No    MedicalDecision making / Summary of Stay:   Aliya Bruno a 82y.o.-year-old  female who was initially admitted on 4/8/2020. Patient seen at the request of Patience Denita HISTORY:      Pt is an 81 yo woman who presented to the ED with SOB and fever to 103. 1. He SaO2 was in the 50's and she was tachycardic.  Per the notes she lives with 2 people who are COVID positive and currently hospitalized.      Her PMH includes PMR and temporal arteritis, she is on chronic steroid therapy. 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      She was seen at [facility] ED on 4-1 with mental status changes after taking 1/2 of an ativan tablet. She also reported that she had vomited x 2.  At that time a CT brain and chest were both negative for acute processes.      Her mental status improved, and she was discharged home.      In the ED she was placed on oxygen with improvement in her SaO2.   1L fluid bolus was given and HR improved.     Labs in ED showed:  WBC 8.3  Cr 1.83 (baseline 1.0)  Lactate 2.9  Troponin

## 2020-04-16 NOTE — PROGRESS NOTES
Renal Progress Note    Patient :  Chip Brown; 82 y.o. MRN# [id number]  Location:  1012/1012-01  Attending:  Tabitha Gray MD  Admit Date:  4/8/2020   Hospital Day: 8      Subjective:     Patient seen and examined. Discussed with nurse involved in her care. Patient was dialyzed yesterday due to hyperkalemia. This morning her potassium is 5.2 and creatinine is 1.5 with BUN of 66. Following for FRANSISCA, admitted with pneumonia, resp failure + COVID 19.  ID following. Remains intubated and sedated. Nephrology has signed off but got reconsulted today for 4/15/2020 due to hyperkalemia. Patient has 7.29 with a PCO2 of 67  Patient is still intubated and sedated. Patient is on a small dose of pressors. Her CODE STATUS is DNR CCA      Outpatient Medications:     Medications Prior to Admission: aspirin 81 MG EC tablet, Take 81 mg by mouth daily  ondansetron (ZOFRAN) 4 MG tablet, Take 4 mg by mouth every 8 hours as needed for Nausea or Vomiting  predniSONE (DELTASONE) 10 MG tablet, Take 10 mg by mouth daily  Denosumab (PROLIA SC), Inject into the skin Due next week  calcium carbonate (TUMS) 500 MG chewable tablet, Take 2 tablets by mouth daily  benzonatate (TESSALON) 100 MG capsule, Take 100 mg by mouth 3 times daily as needed for Cough  gabapentin (NEURONTIN) 300 MG capsule, 3 times daily. imipramine (TOFRANIL) 25 MG tablet, Take 25 mg by mouth daily   IRON PO, Take 325 mg by mouth daily   CRANBERRY PO, Take by mouth  folic acid (FOLVITE) 1 MG tablet, Take 1 tablet by mouth daily  amLODIPine (NORVASC) 5 MG tablet, Take 5 mg by mouth daily  meloxicam (MOBIC) 7.5 MG tablet, Take 7.5 mg by mouth daily  HYDROcodone-acetaminophen (NORCO) 5-325 MG per tablet, Take 1 tablet by mouth every 6 hours as needed for Pain. .  pantoprazole (PROTONIX) 40 MG tablet, Take 40 mg by mouth daily   atorvastatin (LIPITOR) 20 MG tablet, Take 20 mg by mouth  clopidogrel (PLAVIX) 75 MG tablet, Take 75 mg by mouth  gemfibrozil (LOPID) 600 MG tablet, Take 600 mg by mouth 2 times daily   linaclotide (LINZESS) 145 MCG capsule, Take 1 capsule by mouth every morning (before breakfast) (Patient not taking: Reported on 2020)  vitamin B-12 100 MCG tablet, Take 1 tablet by mouth daily  ranitidine (ZANTAC) 150 MG tablet, Take 150 mg by mouth  [DISCONTINUED] oxybutynin (DITROPAN) 5 MG tablet, Take 5 mg by mouth    Current Medications:     Scheduled Meds:    sodium zirconium cyclosilicate  10 g Oral TID    fludrocortisone  0.1 mg Oral Daily    insulin lispro  0-6 Units Subcutaneous TID WC    insulin lispro  0-3 Units Subcutaneous Nightly    aspirin  81 mg Oral Daily    labetalol  10 mg Intravenous Once    famotidine (PEPCID) injection  20 mg Intravenous Daily    sodium chloride flush  10 mL Intravenous 2 times per day       Input/Output:       I/O last 3 completed shifts: In: 1904 [I.V.:1144; NG/GT:660]  Out: 2745 [Urine:775]. Patient Vitals for the past 96 hrs (Last 3 readings):   Weight   20 0459 149 lb 4 oz (67.7 kg)   04/15/20 2106 152 lb 1.9 oz (69 kg)   04/15/20 1839 154 lb 5.2 oz (70 kg)       Vital Signs:   Temperature:  Temp: 98.1 °F (36.7 °C)  TMax:   Temp (24hrs), Av.1 °F (36.7 °C), Min:97.2 °F (36.2 °C), Max:98.8 °F (37.1 °C)    Respirations:  Resp: 20  Pulse:   Pulse: 85  BP:    BP: (!) 155/56  BP Range: Systolic (16AHA), DPK:193 , Min:115 , XVP:154       Diastolic (31WHS), HIMA:37, Min:41, Max:87      Physical Examination:     Patient was seen and examined. Patient was intubated and sedated. She was comfortable.   Patient was on 70% of FiO2  Neck: Supple no carotid bruit  Chest: Bilateral air entry and clear to auscultation no crackles or wheezes  Abdomen: Soft nontender bowel sounds was positive no obvious organomegaly  Extremities: Trace to 1+ edema      Labs:       Recent Labs     20  0515 20  1638 20  0400   WBC 10.3 5.2 13.5*   RBC 3.49* 2.91* 2.55*   HGB 11.1* 9.2* 8.0*   HCT 35.5* 29.0* 26.3*   MCV

## 2020-04-16 NOTE — PROGRESS NOTES
Dr. Puma Rendon paged about repeat bmp orders for one time dose of 20 meq iv potassium    Kecia Adhikari RN

## 2020-04-16 NOTE — PROGRESS NOTES
Nutrition Assessment (Enteral Nutrition)    Type and Reason for Visit: Reassess    Nutrition Recommendations:   - Modify TF formula to Nepro (renal with carb steady) bolus feedings of 225 mL x 3 per day. Monitor TF tolerance/adequacy of intakes. - Will monitor labs, bowel function, and plan of care. Nutrition Assessment: RN reports Nephrology requests pt be placed on a Renal TF formula. Pt remains intubated. Malnutrition Assessment:  · Malnutrition Status:  At risk for malnutrition    Nutrition Risk Level: High    Nutrition Needs:  · Estimated Daily Total Kcal: 9954-2898 kcal/day  · Estimated Daily Protein (g): 65-90 gm pro/day    Nutrition Diagnosis:   · Problem: Inadequate oral intake  · Etiology: related to Impaired respiratory function-inability to consume food     Signs and symptoms:  as evidenced by NPO status due to medical condition, Nutrition support - EN    Objective Information:  · Nutrition-Focused Physical Findings: Proning x 16 hours; FMS in place  · Wound Type: None  · Current Nutrition Therapies:  · Oral Diet Orders: NPO   · Tube Feeding (TF) Orders:   · Feeding Route: Orogastric  · Formula: Fluid Restricted  · Volume (ml/day): 600 mL/day  · Duration: Bolus(200 mL x 3 per day)  · Water Flushes: 60 mL x 3 per day  · Current TF & Flush Orders Provides: 200 mL bolus of TwoCal x 3 per day = 1200 kcals, 75 gm pro/day  · Goal TF & Flush Orders Provides: Nepro (renal with carb steady) bolus 225 mL x 3 per day = 1215 kcals, 55 gm pro/day  · Anthropometric Measures:  · Ht: 5' (152.4 cm)   · Current Body Wt: 149 lb 4 oz (67.7 kg)  · Admission Body Wt: 146 lb 3.2 oz (66.3 kg)  · Ideal Body Wt: 100 lb (45.4 kg), % Ideal Body 146%  · BMI Classification: BMI 25.0 - 29.9 Overweight    Nutrition Interventions:   Modify current Tube Feeding(Suggest switching TF formula to Nepro (renal with carb steady) bolus of 225 mL x 3 per day. )  Continued Inpatient Monitoring, Education Not

## 2020-04-16 NOTE — PROGRESS NOTES
Critical Care Team - Daily Progress Note      Date and time: 4/16/2020 7:57 AM  Patient's name:  Jez Tavera Record Number: [id number]  Patient's account/billing number: [de-identified]  Patient's YOB: 1935  Age: 82 y.o.   Date of Admission: 4/8/2020  3:03 AM  Length of stay during current admission: 8      Primary Care Physician: Norah Muñoz MD  ICU Attending Physician: Dr. Zander Gardiner    Code Status: DNR-CCA    Reason for ICU admission: Respiratory Failure 2/2 COVID-19    SUBJECTIVE:     OVERNIGHT EVENTS:         No acute events overnight   Intubated and sedated   Afebrile, BP support on Levophed   Hemodialysis started last night   PRVC 30/330/12/60, o2 requirement decreasing   ABG 7.299, 67.8, 147.8, 33.2  PFr = 246.33  In 1.9, out 2.7L; 1.9 of this by dialysis, Net +4.8L      AWAKE & FOLLOWING COMMANDS:  [x] No   [] Yes    CURRENT VENTILATION STATUS:     [x] Ventilator  [] BIPAP  [] Nasal Cannula [] Room Air      IF INTUBATED, ET TUBE MARKING AT LOWER LIP:       cms    SECRETIONS Amount:  [] Small [] Moderate  [] Large  [] None  Color:     [] White [] Colored  [] Bloody    SEDATION:  RAAS Score:  [] Propofol gtt  [x] Versed gtt  [] Ativan gtt   [] No Sedation    PARALYZED:  [] No    [x] Yes    DIARRHEA:                [x] No                [] Yes  (C. Difficile status: [] positive                                                                                                                       [] negative                                                                                                                     [] pending)    VASOPRESSORS:  [] No    [x] Yes    If yes -   [x] Levophed       [] Dopamine     [] Vasopressin       [] Dobutamine  [] Phenylephrine         [] Epinephrine    CENTRAL LINES:     [] No   [x] Yes   (Date of Insertion:4/8/2020)           If yes -     [] Right IJ     [] Left IJ [x] Right Femoral [x] Left Femoral HD cath                   [] Right Subclavian [] Left Subclavian       SHARP'S CATHETER:   [] No   [x] Yes    URINE OUTPUT:            [] Good   [x] Low              [] Anuric      OBJECTIVE:     VITAL SIGNS:  BP (!) 155/56   Pulse 75   Temp 98.1 °F (36.7 °C) (Core)   Resp 20   Ht 5' (1.524 m)   Wt 149 lb 4 oz (67.7 kg)   SpO2 99%   BMI 29.15 kg/m²     Tmax over 24 hours:  Temp (24hrs), Av °F (36.7 °C), Min:96.8 °F (36 °C), Max:98.8 °F (37.1 °C)      Patient Vitals for the past 6 hrs:   BP Temp Temp src Pulse SpO2 Weight   20 0700 (!) 155/56 -- -- 75 99 % --   20 0645 -- -- -- 75 99 % --   20 0630 (!) 157/56 -- -- 74 99 % --   20 0615 -- -- -- 76 99 % --   20 0600 (!) 157/56 -- -- 73 99 % --   20 0545 -- -- -- 74 99 % --   20 0530 (!) 152/54 -- -- 74 100 % --   20 0515 -- -- -- 77 99 % --   20 0500 (!) 136/55 -- -- 75 99 % --   20 0459 -- 98.1 °F (36.7 °C) CORE -- -- 149 lb 4 oz (67.7 kg)   20 0445 -- -- -- 75 98 % --   20 0430 (!) 156/61 -- -- 76 98 % --   20 0415 -- -- -- 77 98 % --   20 0400 (!) 149/60 -- -- 77 98 % --   20 0345 -- -- -- 77 99 % --   20 0330 (!) 142/53 -- -- 79 100 % --   20 0315 -- -- -- 81 100 % --   20 0300 (!) 144/58 -- -- 83 100 % --   20 0245 -- -- -- 85 100 % --   20 0230 (!) 137/54 -- -- 88 99 % --   20 0219 -- -- -- 89 99 % --   20 0215 -- -- -- 85 99 % --   20 0200 (!) 133/52 -- -- 81 100 % --         Intake/Output Summary (Last 24 hours) at 2020 0757  Last data filed at 2020 0505  Gross per 24 hour   Intake 1904 ml   Output 2745 ml   Net -841 ml     Wt Readings from Last 2 Encounters:   20 149 lb 4 oz (67.7 kg)   20 140 lb (63.5 kg)     Body mass index is 29.15 kg/m².         PHYSICAL EXAMINATION:  General appearance - Intubated, sedated, paralyzed   Eyes - sclera anicteric  HENT - Normocephalic, neck supple, no significant adenopathy  Chest - clear to high: 5 cmH2O  Pressure High: 30 cmH2O  Pressure low: 0.5 cmH2O  Vt Ordered: 330 mL  Pressure Ordered: 0  Rate Set: 30 bmp  Pressure Support: 2 cmH20  FiO2 : (S) 60 %(post abg)  SpO2: 99 %  SpO2/FiO2 ratio: 163.33  Sensitivity: 2  PEEP/CPAP: (S) 12(post abg)  I Time/ I Time %: 0.9 s  Humidification Source: HME  Additional Respiratory  Assessments  Pulse: 75  Resp: 20  SpO2: 99 %  End Tidal CO2: 41 (%)  Position: Semi-Duenas's  Humidification Source: HME  Oral Care Completed?: Yes  Oral Care: Mouthwash, Mouth swabbed, Mouth suctioned  Subglottic Suction Done?: Yes  Skin barrier applied: No      Laboratory findings:    Complete Blood Count:   Recent Labs     04/14/20  0515 04/14/20  1638 04/16/20  0400   WBC 10.3 5.2 13.5*   HGB 11.1* 9.2* 8.0*   HCT 35.5* 29.0* 26.3*    See Reflexed IPF Result 136*        Last 3 Blood Glucose:   Recent Labs     04/14/20  0515 04/15/20  0543 04/16/20  0403   GLUCOSE 163* 140* 125*        PT/INR:    Lab Results   Component Value Date    PROTIME 10.3 04/08/2020    INR 1.0 04/08/2020     PTT:    Lab Results   Component Value Date    APTT 43.5 04/16/2020       Comprehensive Metabolic Profile:   Recent Labs     04/14/20  0515  04/15/20  0543 04/15/20  1419 04/16/20  0403     --  136  --  139   K 6.2*   < > 5.8* 5.5* 5.2   CL 98  --  95*  --  97*   CO2 24  --  26  --  27   BUN 55*  --  69*  --  66*   CREATININE 1.80*  --  1.75*  --  1.51*   GLUCOSE 163*  --  140*  --  125*   CALCIUM 9.0  --  9.5  --  8.9   PROT 6.7  --  5.8*  --  6.2*   LABALBU 2.0*  --  1.8*  --  1.9*   BILITOT 0.35  --  0.45  --  0.36   ALKPHOS 85  --  77  --  93   *  --  1,029*  --  534*   ALT 82*  --  405*  --  376*    < > = values in this interval not displayed.       Magnesium:   Lab Results   Component Value Date    MG 1.9 04/12/2020     Phosphorus: No results found for: PHOS  Ionized Calcium: No results found for: CAION     Troponin: No results for input(s): TROPONINI in the last 72

## 2020-04-16 NOTE — PROGRESS NOTES
Dialysis Post Treatment Note  /71  Pulse 119  Resp 20  Temp 36.8C  Sp02 100%  Pre-Weight = 70 kg  Post-weight = Weight: 152 lb 1.9 oz (69 kg)  Total Liters Processed = Total Liters Processed (l/min): 23.1 l/min  Rinseback Volume (mL) = Rinseback Volume (ml): 380 ml  Net Removal (mL) = 1490  Type of access used= Lt Femoral Catheter  Length of treatment= 2 hours    Pt tolerated HD, Lt fem cath worked, arterial pressures went up to -180mmHg @ BFR 200ml/min, flushed 100ml NS, lines reversed, venous pressure went up to 270mmHG, pt tacky pre & throughout tx, pt hypotensive intermittmently, temp decreased, Floor RN increased pressor support, pt had no agitation/restlessness.

## 2020-04-17 NOTE — PROGRESS NOTES
HME  Oral Care Completed?: Yes  Oral Care: Mouthwash, Lip moisturizer applied, Mouth moisturizer, Mouth suctioned  Subglottic Suction Done?: Yes  Skin barrier applied: No      Laboratory findings:    Complete Blood Count:   Recent Labs     04/14/20  1638 04/16/20  0400   WBC 5.2 13.5*   HGB 9.2* 8.0*   HCT 29.0* 26.3*   PLT See Reflexed IPF Result 136*        Last 3 Blood Glucose:   Recent Labs     04/16/20  0403 04/16/20  1805 04/17/20  0339   GLUCOSE 125* 115* 128*        PT/INR:    Lab Results   Component Value Date    PROTIME 10.3 04/08/2020    INR 1.0 04/08/2020     PTT:    Lab Results   Component Value Date    APTT 40.7 04/17/2020       Comprehensive Metabolic Profile:   Recent Labs     04/16/20  0403 04/16/20  1805 04/17/20  0339    138 137   K 5.2 3.5* 2.9*   CL 97* 94* 92*   CO2 27 28 30   BUN 66* 75* 71*   CREATININE 1.51* 1.53* 1.27*   GLUCOSE 125* 115* 128*   CALCIUM 8.9 8.2* 8.0*   PROT 6.2*  --  5.7*   LABALBU 1.9*  --  1.6*   BILITOT 0.36  --  0.39   ALKPHOS 93  --  90   *  --  137*   *  --  195*      Magnesium:   Lab Results   Component Value Date    MG 1.6 04/17/2020     Phosphorus: No results found for: PHOS  Ionized Calcium: No results found for: CAION     Troponin: No results for input(s): TROPONINI in the last 72 hours.     Microbiology:    Cultures during this admission:     COVID-19 Positive     Blood cultures:                 [] None drawn      [x] Negative             []  Positive (Details:  )  Urine Culture:                   [] None drawn      [] Negative             []  Positive (Details:  )  Sputum Culture:               [] None drawn       [] Negative             []  Positive (Details:  )   Endotracheal aspirate:     [] None drawn       [] Negative             []  Positive (Details:  )     Radiology/Imaging:     Xr Chest Portable    Result Date: 4/14/2020  EXAMINATION: ONE XRAY VIEW OF THE CHEST 4/14/2020 9:37 am COMPARISON: April 11, 2020 HISTORY: [id number]

## 2020-04-17 NOTE — PROGRESS NOTES
Renal Progress Note    Patient :  Chip Brown; 82 y.o. MRN# [id number]  Location:  1012/1012-01  Attending:  Tabitha Gray MD  Admit Date:  4/8/2020   Hospital Day: 9      Subjective:     Patient seen and examined with the vendor. I go over all the [facility] setting. Discuss with physician involved in his care  Hyperkalemia resolved actually did her potassium was low this morning  Her pH is 7.5 this morning  Renal function improved  With an improvement in urine output noted  Creatinine is down to 1.27  Her CODE STATUS is DNR CCA      Outpatient Medications:     Medications Prior to Admission: aspirin 81 MG EC tablet, Take 81 mg by mouth daily  ondansetron (ZOFRAN) 4 MG tablet, Take 4 mg by mouth every 8 hours as needed for Nausea or Vomiting  predniSONE (DELTASONE) 10 MG tablet, Take 10 mg by mouth daily  Denosumab (PROLIA SC), Inject into the skin Due next week  calcium carbonate (TUMS) 500 MG chewable tablet, Take 2 tablets by mouth daily  benzonatate (TESSALON) 100 MG capsule, Take 100 mg by mouth 3 times daily as needed for Cough  gabapentin (NEURONTIN) 300 MG capsule, 3 times daily. imipramine (TOFRANIL) 25 MG tablet, Take 25 mg by mouth daily   IRON PO, Take 325 mg by mouth daily   CRANBERRY PO, Take by mouth  folic acid (FOLVITE) 1 MG tablet, Take 1 tablet by mouth daily  amLODIPine (NORVASC) 5 MG tablet, Take 5 mg by mouth daily  meloxicam (MOBIC) 7.5 MG tablet, Take 7.5 mg by mouth daily  HYDROcodone-acetaminophen (NORCO) 5-325 MG per tablet, Take 1 tablet by mouth every 6 hours as needed for Pain. .  pantoprazole (PROTONIX) 40 MG tablet, Take 40 mg by mouth daily   atorvastatin (LIPITOR) 20 MG tablet, Take 20 mg by mouth  clopidogrel (PLAVIX) 75 MG tablet, Take 75 mg by mouth  gemfibrozil (LOPID) 600 MG tablet, Take 600 mg by mouth 2 times daily   linaclotide (LINZESS) 145 MCG capsule, Take 1 capsule by mouth every morning (before breakfast) (Patient not taking: Reported on 1/2/2020)  vitamin B-12 100 MCG

## 2020-04-17 NOTE — PROGRESS NOTES
[facility]   Progress Note                   Date:   4/17/2020  Patient name: Ron Mora  Date of admission:  4/8/2020  3:03 AM  MRN:   [id number]  YOB: 1935  PCP: Jacki Young MD    Reason for Admission:      Subjective:       D/w nursing  sedated and intubated  Off pressors.        Medications:   Scheduled Meds:   labetalol  10 mg Intravenous Once    potassium chloride  40 mEq Oral BID    clopidogrel  75 mg Oral Daily    heparin (porcine)  5,000 Units Subcutaneous 3 times per day    insulin lispro  0-6 Units Subcutaneous Q6H    bumetanide  1 mg Intravenous BID    aspirin  81 mg Oral Daily    labetalol  10 mg Intravenous Once    famotidine (PEPCID) injection  20 mg Intravenous Daily    sodium chloride flush  10 mL Intravenous 2 times per day       Continuous Infusions:   dextrose      norepinephrine Stopped (04/16/20 1652)    vasopressin (Septic Shock) infusion Stopped (04/15/20 0634)    vecuronium (NORCURON) infusion 0.5 mcg/kg/min (04/16/20 1345)    sodium chloride 10 mL/hr at 04/13/20 0300    midazolam 10 mg/hr (04/17/20 0553)    fentaNYL 150 mcg/hr (04/17/20 0521)       CBC:   Recent Labs     04/14/20  1638 04/16/20  0400   WBC 5.2 13.5*   HGB 9.2* 8.0*   PLT See Reflexed IPF Result 136*     BMP:    Recent Labs     04/16/20  0403 04/16/20  1805 04/17/20  0339    138 137   K 5.2 3.5* 2.9*   CL 97* 94* 92*   CO2 27 28 30   BUN 66* 75* 71*   CREATININE 1.51* 1.53* 1.27*   GLUCOSE 125* 115* 128*     Hepatic:   Recent Labs     04/15/20  0543 04/16/20  0403 04/17/20  0339   AST 1,029* 534* 137*   * 376* 195*   BILITOT 0.45 0.36 0.39   ALKPHOS 77 93 90         Objective:   Vitals: /63   Pulse 104   Temp 99.1 °F (37.3 °C) (Core)   Resp 12   Ht 5' (1.524 m)   Wt 149 lb 4 oz (67.7 kg)   SpO2 94%   BMI 29.15 kg/m²     Defer Physical exam to primary medicine team bxz of COVID 19         Assessment:     For careful stewardship of limited PPE during COVID-19 pandemic my physical exam was deferred. For physical exam, please see today's physical from primary team or ICU team.     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 6 DNR CCA code status      Treatment Plan:   - s/p 48 hours of heparin drip- can stop  - back on DAPT  - remainder of care per primary/ICU team  - if and when recovers would benefit from outpatient Echo or possible ischemia workup, will defer to primary cardiologist    Will sign off, please call if any questions. Discussed with nursing. Thank you for allowing me to participate in the care of this patient, please do not hesitate to call if you have any questions. Tamiko Pineda DO, [facility] [place], [address] [facility]  [email or web address]  [phone]

## 2020-04-17 NOTE — PROGRESS NOTES
Shift 7p-7a  Patient on Ventilator  Skin barrier in place Mepelex and raysa    Skin Integrity Score:  0 = intact, no signs of compromise  1 = reddened/blanched without breakdown noted  2 = breakdown  YES    Breakdown:  1 = raw/excoriated  1 = eroded  1 = black/necrotic  YES  1 = bleeding/weeping YES    Skin Integrity/Breakdown Score  Score = 4    Intervention needed YES  Action taken:Raysa carefully removed an mepelex placed down to cover wounds then holister placed on top of the mepelex's to secure ETT. A total score of:   1 requires documentation of respiratory therapist intervention. 2 requires notification to Wound Care/RN.    3 or above requires a Safe Care/[facility] report and notify physician/wound care/RN

## 2020-04-17 NOTE — PROGRESS NOTES
Nutrition Assessment (Enteral Nutrition)    Type and Reason for Visit: Reassess    Nutrition Recommendations:   - Continue TF of Nepro (renal with carb steady) bolus feedings of 225 mL x 3 per day. Monitor TF tolerance/adequacy of intakes. - Will monitor labs, bowel function, and plan of care. Nutrition Assessment: Pt remains intubated. RN reports pt has recieved 1 bolus feed this morning - tolerating well so far. Labs reviewed - K+2.9 mmol/L - 60 mEq KCl replacement given. Malnutrition Assessment:  · Malnutrition Status:  At risk for malnutrition    Nutrition Risk Level: High    Nutrition Needs:  · Estimated Daily Total Kcal: 6812-7417 kcal/day  · Estimated Daily Protein (g): 65-90 gm pro/day    Nutrition Diagnosis:   · Problem: Inadequate oral intake  · Etiology: related to Impaired respiratory function-inability to consume food     Signs and symptoms:  as evidenced by NPO status due to medical condition, Nutrition support - EN    Objective Information:  · Nutrition-Focused Physical Findings: Proning x 16 hours; FMS in place  · Wound Type: None  · Current Nutrition Therapies:  · Oral Diet Orders: NPO   · Tube Feeding (TF) Orders:   · Feeding Route: Orogastric  · Formula: Renal  · Volume (ml/day): 675 mL/day  · Duration: Bolus(225 mL x 3 )  · Water Flushes: 60 mL x 3 per day  · Current TF & Flush Orders Provides: 1215 kcals, 55 gm pro/day  · Goal TF & Flush Orders Provides: Nepro (renal with carb steady) bolus 225 mL x 3 per day = 1215 kcals, 55 gm pro/day  · Anthropometric Measures:  · Ht: 5' (152.4 cm)   · Current Body Wt: 149 lb 4 oz (67.7 kg)  · Admission Body Wt: 146 lb 3.2 oz (66.3 kg)  · Ideal Body Wt: 100 lb (45.4 kg), % Ideal Body 146%  · BMI Classification: BMI 25.0 - 29.9 Overweight    Nutrition Interventions:   Continue current Tube Feeding  Continued Inpatient Monitoring, Education Not Indicated    Nutrition Evaluation:   · Evaluation: Progressing toward goals   · Goals: Start of nutrition within 48 hours. Meet % of estimated nutrition needs.    · Monitoring: TF Intake, TF Tolerance, Skin Integrity, I&O, Weight, Pertinent Labs, Monitor Hemodynamic Status, Monitor Bowel Function    Electronically signed by Aarti Stallings RD, LD on 4/17/20 at 1:52 PM EDT    Contact Number: [phone]

## 2020-04-17 NOTE — PROGRESS NOTES
43  Lymph % 2  Ferritin 1484     CXR showed:  Multifocal bilateral heterogeneous pulmonary opacities with consolidation in   the right mid and lower lung zones.  Differential considerations include   multifocal pneumonia including atypical or viral pneumonia and asymmetric   pulmonary edema.       Cardiomegaly. She was given a dose of Levaquin 750 mg x 1 and admitted to the COVID step down unit for further care. Her oxygen requirement increased and she was transferred to the ICU and intubated on 4-8. Developed hyperkalemia requiring HD for removal of K  Now hypokalemic  Plan for goals of care meeting with family 4-18      CURRENT EXAMINATION: 4/17/2020     Patient was seen and evaluated in the ICU. Plan for goals of care meeting with family 4-18    Afebrile  VSS currently off levophed. Very labile BP      Hypokalemic overnight  Bicarb, lokelma, and florinef discontinued     She remains intubated, sedated and paralyzed. Overall requiring more respiratory support. FiO2 40-> 50->70->60->65  PEEP  5->14->12->11->12    Creatinine improving   LFT's continues to improve     Completed Azithromycin and Plaquenil course. stop date 4-13     CXR 4-14-20  ET tube in good position.  Continued chronic interstitial markings with   slight improvement in patchy focal infiltrates. NEWS Score: 0-4 Low risk group; 5-6: Medium risk group; 7 or above: High risk group  Parameters 3 2 1 0 1 2 3   Age       < 65     ? 65   RR ? 8   9-11 12-20   21-24 ? 25   O2 Sats ?  91 92-93 94-95 ? 96         Suppl O2   Yes   No         SBP ? 90  101-110 111-219     ? 220   HR ? 40   41-50 51-90  111-130 ? 131   Consciousness       Alert     Drowsiness, lethargy, or confusion   Temperature ? 35.0 C (95.0 F)   35.1-36.0 C 95.1-96.9 F 36.1-38.0 C 97.0-100.4 F 38.1-39.0 C 100.5-102.3 F ? 39.1 C ? 102.4 F        NEWS Score:  · 4-8-20: 11 high risk  · 4-9-20: 14  · 4-10-20: 10  · 4-11-20: 8  · 4-12-20: 9  · 4-13-20: 12  · 4-14-20: 11  · 4-15-20: 12  · 4-16-20: 11    Labs, X rays reviewed: 4/17/2020    BUN: 69->66->71  Cr: 1.75->1.51->1.27  K 2.9     .9    ProBNP 953  Ferritin 1484     WBC: 10.2-->5.2->13.5  Hb: 11.1->9.2->8.0  Plat:  178->131->136    Cultures:  Urine:  · 4-8 no growth   Blood:  · 4-8 x2 no growth to date   Sputum :    Wound:     Viral PCR negative  COVID positive   Nasal MRSA negative   Strept/Legionella negative     Imaging:  CXR 4-14-20    CXR 4-11-20    CXR 4-8-20      Discussed with RN.     I have personally reviewed the past medical history, past surgical history, medications, social history, and family history, and I have updated the database accordingly. Review of systems:     Review of Systems   Unable to perform ROS: Intubated     Physical Examination :     Due to the current efforts to prevent transmission of COVID-19 and also the need to preserve PPE for other caregivers, a face-to-face encounter with the patient was not performed. That being said, all relevant records and diagnostic tests were reviewed, including laboratory results and imaging. Please reference any relevant documentation elsewhere. Care will be coordinated with the primary service. Laboratory data:   I have independently reviewed the followinglabs:  CBC with Differential:   Recent Labs     04/14/20  1638 04/16/20  0400   WBC 5.2 13.5*   HGB 9.2* 8.0*   HCT 29.0* 26.3*   PLT See Reflexed IPF Result 136*   LYMPHOPCT  --  2*   MONOPCT  --  6     BMP:   Recent Labs     04/16/20  1805 04/17/20  0339    137   K 3.5* 2.9*   CL 94* 92*   CO2 28 30   BUN 75* 71*   CREATININE 1.53* 1.27*   MG 1.7 1.6     Hepatic Function Panel:   Recent Labs     04/16/20  0403 04/17/20  0339   PROT 6.2* 5.7*   LABALBU 1.9* 1.6*   BILIDIR 0.22 0.16   IBILI 0.14 0.23   BILITOT 0.36 0.39   ALKPHOS 93 90   * 195*   * 137*     No results for input(s): AKASH in the last 72 hours.    Lab Results   Component Value Date    .9 (H) Specimen: Nasal Updated: 04/08/20 4914    Specimen Description . NASAL SWAB    MRSA, DNA, Nasal NEGATIVE:  MRSA DNA not detected by nucleic acid amplification. Medications:      labetalol  10 mg Intravenous Once    potassium chloride  40 mEq Oral BID    clopidogrel  75 mg Oral Daily    heparin (porcine)  5,000 Units Subcutaneous 3 times per day    insulin lispro  0-6 Units Subcutaneous Q6H    bumetanide  1 mg Intravenous BID    aspirin  81 mg Oral Daily    labetalol  10 mg Intravenous Once    famotidine (PEPCID) injection  20 mg Intravenous Daily    sodium chloride flush  10 mL Intravenous 2 times per day     Medical Decision [id number]:        Medical Decision Making-Other:      Note:  · Labs, medications, radiologic studies were reviewed with personal review of films  · Moderate Large amounts of data were reviewed  · Discussed with nursing Staff, Discharge planner  · Infection Control and Prevention measures reviewed  · All prior entries were reviewed  · Administer medications as ordered  · Prognosis: Very Guarded  · Discharge planning reviewed  · Follow up as outpatient.     Thank you for allowing us to participate in the care of this patient. Please call with questions.     Niranjan Hyde MD.

## 2020-04-18 NOTE — FLOWSHEET NOTE
04/17/20 7940   Family/Physician Notification   Do you want a family member or representative notified about this admission? Yes   Family/Representative Name & Phone Number satish  ([phone])   Notification Completed Yes   Spoke with Satish(patient daughter) and gave update.
[facility]   Patient Death Note  DEATH   Shift date: 2020    Shift day: Saturday  Shift #: 2                 Room # 1012/1012-01   Name: Alexandru Salvador            Age: 82 y.o. Gender: female          [place]avian: Kentrell Davon Date & Time: 2020  3:03 AM     Referral: Yes   Actual date of death: 2020   TOD: 16:44         IS THIS A 'S CASE? No    SPIRITUAL/EMOTIONAL INTERVENTION:  Patient to be extubated. Family requests video and/or audio connection with patient.  connected with family via phone and video apps and coordinated with medical staff at time of extubation. Family able to speak with patient via mobile devices. Patient  shortly after extubation and family grieving deeply and appropriately. Family Received Grief Packet? No       NAME AND PHONE NUMBER OF DOCTOR SIGNING DEATH CERTIFICATE:  Dr. Chani Saba to contact  [facility]    Copy of COMPLETED Release of Body Form Received? No     HOME:  Name: [facility]  City:   Phone Number:     NEXT OF KIN:  Name: Arnold Almonte  Relationship: Daughter  Street Address:   City:   State:   Zip code:    Phone Number: [place]? Yes    IF SO, WHAT? Family to call with [facility] selection.     Electronically signed by Byrnn Silva on 2020 at 7:36 PM.  [facility]  [phone]
Peter Kaufman, pt's daughter, given update. Emotional support given.  All questions answered
Pt daughter Kamran Bray was updated at this time. All questions were answered. Kamran Bray was informed that she would be updated again in the morning.
RN called patient's contact and there was no answer.
RN spoke to [facility] Resident patient bradycardiac and hypotension. RN decreased patient's sedation and possible start of Levophed. MD stated to wait until patient's blood pressure systolic is below 90 and/or MAP below 60. RN placed a verbal order. Levophed has not started at this time.
feelings/needs/concerns;Coping;Less anxious, less agitated;Receptive; Shared life review;Comfort     Electronically signed by Reji Stiles on 4/9/2020 at 5:17 PM

## 2020-04-18 NOTE — PROGRESS NOTES
Megan Pearson    1935    Update family on Ms. Megan Pearson condition     Partcipated in conference call with palliative care team Dr. Oksana Mclaughlin and Dr. Cintia Huggins all 5 family members/ children. Discussed prognosis and condition of pt with family. Decision was to honor what they believe is the pts wishes and to give comfort care measures. Code status was changed to [facility]. Plan is to change orders to comfort care and terminal extubated. Will have spiritual services liaison with family and allow them to possibly video with [facility].     Electronically signed by Raciel Mcintyre MD on 2020 at 3:51 PM

## 2020-04-18 NOTE — PLAN OF CARE
Problem: OXYGENATION/RESPIRATORY FUNCTION  Goal: Patient will maintain patent airway  4/14/2020 0837 by Eveline Machado RCP  Outcome: Ongoing  4/13/2020 2337 by Juan Pablo Koch RCP  Outcome: Ongoing  Goal: Patient will achieve/maintain normal respiratory rate/effort  Description: Respiratory rate and effort will be within normal limits for the patient  4/14/2020 0837 by Eveline Machado RCP  Outcome: Ongoing  4/13/2020 2337 by Juan Pablo Koch RCP  Outcome: Ongoing     Problem: MECHANICAL VENTILATION  Goal: Patient will maintain patent airway  4/14/2020 0837 by Eveline Machado RCP  Outcome: Ongoing  4/13/2020 2337 by Juan Pablo Koch RCP  Outcome: Ongoing  Goal: Oral health is maintained or improved  4/14/2020 0837 by Eveline Machado RCP  Outcome: Ongoing  4/13/2020 2337 by Juan Pablo Koch RCP  Outcome: Ongoing  Goal: ET tube will be managed safely  4/14/2020 0837 by Eveline Machado RCP  Outcome: Ongoing  4/13/2020 2337 by Juan Pablo Koch RCP  Outcome: Ongoing  Goal: Ability to express needs and understand communication  4/14/2020 0837 by Eveline Machado RCP  Outcome: Ongoing  4/13/2020 2337 by Juan Pablo Koch RCP  Outcome: Ongoing  Goal: Mobility/activity is maintained at optimum level for patient  4/14/2020 0837 by Eveline Machado RCP  Outcome: Ongoing  4/13/2020 2337 by Juan Pablo Koch RCP  Outcome: Ongoing     Problem: SKIN INTEGRITY  Goal: Skin integrity is maintained or improved  4/13/2020 2337 by Juan Pablo Koch RCP  Outcome: Ongoing
Problem: OXYGENATION/RESPIRATORY FUNCTION  Goal: Patient will maintain patent airway  4/17/2020 2304 by Bettyjo Nageotte  Outcome: Ongoing     Problem: OXYGENATION/RESPIRATORY FUNCTION  Goal: Patient will achieve/maintain normal respiratory rate/effort  Description: Respiratory rate and effort will be within normal limits for the patient  4/17/2020 2304 by Bettyjo Nageotte  Outcome: Ongoing     Problem: MECHANICAL VENTILATION  Goal: Patient will maintain patent airway  4/17/2020 2304 by Bettyjo Nageotte  Outcome: Ongoing     Problem: MECHANICAL VENTILATION  Goal: Oral health is maintained or improved  4/17/2020 2304 by Bettyjo Nageotte  Outcome: Ongoing     Problem: MECHANICAL VENTILATION  Goal: ET tube will be managed safely  4/17/2020 2304 by Bettyjo Nageotte  Outcome: Ongoing     Problem: MECHANICAL VENTILATION  Goal: Ability to express needs and understand communication  4/17/2020 2304 by Bettyjo Nageotte  Outcome: Ongoing     Problem: MECHANICAL VENTILATION  Goal: Mobility/activity is maintained at optimum level for patient  4/17/2020 2304 by Bettyjo Nageotte  Outcome: Ongoing     Problem: SKIN INTEGRITY  Goal: Skin integrity is maintained or improved  4/17/2020 2304 by Bettyjo Nageotte  Outcome: Ongoing
Problem: OXYGENATION/RESPIRATORY FUNCTION  Goal: Patient will maintain patent airway  4/18/2020 0748 by Caitlin Pierce RCP  Outcome: Ongoing  4/17/2020 2304 by Devorah Saxena  Outcome: Ongoing  Goal: Patient will achieve/maintain normal respiratory rate/effort  Description: Respiratory rate and effort will be within normal limits for the patient  4/18/2020 0748 by Caitlin Pierce RCP  Outcome: Ongoing  4/17/2020 2304 by Devorah Saxena  Outcome: Ongoing     Problem: MECHANICAL VENTILATION  Goal: Patient will maintain patent airway  4/18/2020 0748 by Caitlin Pierce RCP  Outcome: Ongoing  4/17/2020 2304 by Devorah Saxena  Outcome: Ongoing  Goal: Oral health is maintained or improved  4/18/2020 0748 by Caitlin Pierce RCP  Outcome: Ongoing  4/17/2020 2304 by Devorah Saxena  Outcome: Ongoing  Goal: ET tube will be managed safely  4/18/2020 0748 by Caitlin Pierce RCP  Outcome: Ongoing  4/17/2020 2304 by Devorah Saxena  Outcome: Ongoing  Goal: Ability to express needs and understand communication  4/18/2020 0748 by Caitlin Pierce RCP  Outcome: Ongoing  4/17/2020 2304 by Devorah Saxean  Outcome: Ongoing  Goal: Mobility/activity is maintained at optimum level for patient  4/18/2020 0748 by Caitlin Pierce RCP  Outcome: Ongoing  4/17/2020 2304 by Devorah Saxena  Outcome: Ongoing     Problem: SKIN INTEGRITY  Goal: Skin integrity is maintained or improved  4/18/2020 0748 by Caitlin Pierce RCP  Outcome: Ongoing  4/17/2020 2304 by Devorah Saxena  Outcome: Ongoing
Problem: OXYGENATION/RESPIRATORY FUNCTION  Goal: Patient will maintain patent airway  4/9/2020 2000 by Victor Hugo Pepper RCP  Outcome: Ongoing  4/9/2020 0913 by Eryn Bridges RCP  Outcome: Ongoing  4/9/2020 0627 by Jenni Marina RN  Outcome: Ongoing     Problem: OXYGENATION/RESPIRATORY FUNCTION  Goal: Patient will achieve/maintain normal respiratory rate/effort  Description: Respiratory rate and effort will be within normal limits for the patient  4/9/2020 2000 by Victor Hugo Pepper RCP  Outcome: Ongoing     Problem: MECHANICAL VENTILATION  Goal: Patient will maintain patent airway  4/9/2020 2000 by Victor Hugo Pepper RCP  Outcome: Ongoing     Problem: MECHANICAL VENTILATION  Goal: Oral health is maintained or improved  4/9/2020 2000 by Victor Hugo Pepper RCP  Outcome: Ongoing     Problem: MECHANICAL VENTILATION  Goal: ET tube will be managed safely  4/9/2020 2000 by Victor Hugo Pepper RCP  Outcome: Ongoing     Problem: MECHANICAL VENTILATION  Goal: Ability to express needs and understand communication  4/9/2020 2000 by Victor Hugo Pepper RCP  Outcome: Ongoing     Problem: MECHANICAL VENTILATION  Goal: Mobility/activity is maintained at optimum level for patient  4/9/2020 2000 by Victor Hugo Pepper RCP  Outcome: Ongoing
Problem: OXYGENATION/RESPIRATORY FUNCTION  Goal: Patient will maintain patent airway  Outcome: Ongoing     Problem: OXYGENATION/RESPIRATORY FUNCTION  Goal: Patient will achieve/maintain normal respiratory rate/effort  Description: Respiratory rate and effort will be within normal limits for the patient  Outcome: Ongoing     Problem: MECHANICAL VENTILATION  Goal: Patient will maintain patent airway  Outcome: Ongoing     Problem: MECHANICAL VENTILATION  Goal: Oral health is maintained or improved  Outcome: Ongoing     Problem: MECHANICAL VENTILATION  Goal: ET tube will be managed safely  Outcome: Ongoing     Problem: MECHANICAL VENTILATION  Goal: Ability to express needs and understand communication  Outcome: Ongoing     Problem: MECHANICAL VENTILATION  Goal: Mobility/activity is maintained at optimum level for patient  Outcome: Ongoing     Problem: SKIN INTEGRITY  Goal: Skin integrity is maintained or improved  Outcome: Ongoing
Problem: OXYGENATION/RESPIRATORY FUNCTION  Goal: Patient will maintain patent airway  Outcome: Ongoing  Goal: Patient will achieve/maintain normal respiratory rate/effort  Description: Respiratory rate and effort will be within normal limits for the patient  Outcome: Ongoing     Problem: MECHANICAL VENTILATION  Goal: Patient will maintain patent airway  Outcome: Ongoing  Goal: Oral health is maintained or improved  Outcome: Ongoing  Goal: ET tube will be managed safely  Outcome: Ongoing  Goal: Ability to express needs and understand communication  Outcome: Ongoing  Goal: Mobility/activity is maintained at optimum level for patient  Outcome: Ongoing     Problem: SKIN INTEGRITY  Goal: Skin integrity is maintained or improved  Outcome: Ongoing
Problem: OXYGENATION/RESPIRATORY FUNCTION  Goal: Patient will maintain patent airway  Outcome: Ongoing  Goal: Patient will achieve/maintain normal respiratory rate/effort  Description: Respiratory rate and effort will be within normal limits for the patient  Outcome: Ongoing     Problem: MECHANICAL VENTILATION  Goal: Patient will maintain patent airway  Outcome: Ongoing  Goal: Oral health is maintained or improved  Outcome: Ongoing  Goal: ET tube will be managed safely  Outcome: Ongoing  Goal: Ability to express needs and understand communication  Outcome: Ongoing  Goal: Mobility/activity is maintained at optimum level for patient  Outcome: Ongoing     Problem: SKIN INTEGRITY  Goal: Skin integrity is maintained or improved  Outcome: Ongoing     Problem: Restraint Use - Nonviolent/Non-Self-Destructive Behavior:  Goal: Absence of restraint indications  Description: Absence of restraint indications  Outcome: Ongoing  Goal: Absence of restraint-related injury  Description: Absence of restraint-related injury  Outcome: Ongoing     Problem: Falls - Risk of:  Goal: Will remain free from falls  Description: Will remain free from falls  Outcome: Ongoing  Goal: Absence of physical injury  Description: Absence of physical injury  Outcome: Ongoing     Problem: Nutrition  Goal: Optimal nutrition therapy  Outcome: Ongoing
Ongoing     Problem: Restraint Use - Nonviolent/Non-Self-Destructive Behavior:  Goal: Absence of restraint indications  Description: Absence of restraint indications  4/12/2020 0659 by Amelia Chatman RN  Outcome: Ongoing  Goal: Absence of restraint-related injury  Description: Absence of restraint-related injury  4/12/2020 0659 by Amelia Chatman RN  Outcome: Ongoing     Problem: Falls - Risk of:  Goal: Will remain free from falls  Description: Will remain free from falls  4/12/2020 0659 by Amelia Chatman RN  Outcome: Ongoing  Goal: Absence of physical injury  Description: Absence of physical injury  4/12/2020 0659 by Amelia Chatman RN  Outcome: Ongoing     Problem: Nutrition  Goal: Optimal nutrition therapy  4/12/2020 0659 by Amelia Chatman RN  Outcome: Ongoing

## 2020-04-18 NOTE — PATIENT CARE CONFERENCE
well/better. Dr. Zev Moss and palliative care team answered all the questions. One of the family members stated that she does not want the patient to be suffering and in pain. Family members stated that mother was in pain for last 5 years and has been mentioning that she wants to be home with her . All family members agreed to change code status and proceed with withdrawal of support, except Marina Choi. Marina Choi was struggling to accept that patient is declining. All other family members tried to explain to Marina Choi that continuing on life support is not what mom would have wanted. Marina Choi stated that it is difficult because she thought mother was getting better and stronger. Marina Choi did mention that she does not want the patient to be suffering or in pain, but has difficulty saying the words to let the patient go. Palliative care team explained that we do want all family members to made decisions together. However, as long as majority of family members agree then we can proceed with comfort care and withdrawal of support. Some family members wanted to see the patient. Palliative care team offered that we can contact  for assistance with face time. All other family members stated that they want patient to be comfortable and proceed with withdrawal of support and tried for comfort Marina Choi. Dr. Oksana Mclaughlin again confirmed the family's wishes. Conclusion/Plan:  - Decision made by family to change code status to DNR CC and withdrawal of support. - Family wants to be updated when withdrawal of support happens.   - Critical care team will be contacting spiritual care for face time with Alla.       Electronically signed by   Berta King MD  Palliative Care Team  on 4/18/2020 at 11:31 AM

## 2020-04-18 NOTE — PROGRESS NOTES
Renal Progress Note    Patient :  Chip Brown; 82 y.o. MRN# [id number]  Location:  1012/1012-01  Attending:  Tabitha Gray MD  Admit Date:  4/8/2020   Hospital Day: 10      Subjective:     Patient seen and examined with the RN. Discuss with physician involved in his care  Hyperkalemia up a bit after replacement yesterday today 5.1, had been up to 6.3 then dropped to 2.9 yesterday am.  Her pH is 7.513 this morning  U/O good 2592 past 24hours  Creatinine is further improved to 0.93. Her code status is DNR CCA  Plans to remove dialysis catheter today. Off levophed. Palliative care was consulted. Family meeting call this am at 11:00 planned. Outpatient Medications:     Medications Prior to Admission: aspirin 81 MG EC tablet, Take 81 mg by mouth daily  ondansetron (ZOFRAN) 4 MG tablet, Take 4 mg by mouth every 8 hours as needed for Nausea or Vomiting  predniSONE (DELTASONE) 10 MG tablet, Take 10 mg by mouth daily  Denosumab (PROLIA SC), Inject into the skin Due next week  calcium carbonate (TUMS) 500 MG chewable tablet, Take 2 tablets by mouth daily  benzonatate (TESSALON) 100 MG capsule, Take 100 mg by mouth 3 times daily as needed for Cough  gabapentin (NEURONTIN) 300 MG capsule, 3 times daily. imipramine (TOFRANIL) 25 MG tablet, Take 25 mg by mouth daily   IRON PO, Take 325 mg by mouth daily   CRANBERRY PO, Take by mouth  folic acid (FOLVITE) 1 MG tablet, Take 1 tablet by mouth daily  amLODIPine (NORVASC) 5 MG tablet, Take 5 mg by mouth daily  meloxicam (MOBIC) 7.5 MG tablet, Take 7.5 mg by mouth daily  HYDROcodone-acetaminophen (NORCO) 5-325 MG per tablet, Take 1 tablet by mouth every 6 hours as needed for Pain. .  pantoprazole (PROTONIX) 40 MG tablet, Take 40 mg by mouth daily   atorvastatin (LIPITOR) 20 MG tablet, Take 20 mg by mouth  clopidogrel (PLAVIX) 75 MG tablet, Take 75 mg by mouth  gemfibrozil (LOPID) 600 MG tablet, Take 600 mg by mouth 2 times daily   linaclotide (LINZESS) 145 MCG capsule, Take 1 1.27* 1.04* 0.93*   GLUCOSE 128* 122* 103*   CALCIUM 8.0* 8.1* 8.3*      Albumin:    Recent Labs     04/16/20  0403 04/17/20  0339   LABALBU 1.9* 1.6*     BNP:      Lab Results   Component Value Date    BNP 61 03/12/2013     PAMELLA:      Lab Results   Component Value Date    PAMELLA NEGATIVE 04/08/2020     SPEP:  Lab Results   Component Value Date    PROT 5.7 04/17/2020    ALBCAL 3.1 04/08/2020    ALBPCT 52 04/08/2020    LABALPH 0.4 04/08/2020    LABALPH 1.1 04/08/2020    A1PCT 7 04/08/2020    A2PCT 19 04/08/2020    LABBETA 0.7 04/08/2020    BETAPCT 12 04/08/2020    GAMGLOB 0.6 04/08/2020    GGPCT 10 04/08/2020    PATH ELECTRONICALLY SIGNED. Alexsander Whittington M.D. 04/08/2020    PATH ELECTRONICALLY SIGNED. Alexsander Whittington M.D. 04/08/2020     C4:     Lab Results   Component Value Date    C4 16 04/09/2020     Urinalysis/Chemistries:      Lab Results   Component Value Date    NITRU NEGATIVE 04/08/2020    COLORU YELLOW 04/08/2020    PHUR 5.5 04/08/2020    WBCUA None 04/08/2020    RBCUA 2 TO 5 04/08/2020    MUCUS NOT REPORTED 04/08/2020    TRICHOMONAS NOT REPORTED 04/08/2020    YEAST NOT REPORTED 04/08/2020    BACTERIA NOT REPORTED 04/08/2020    SPECGRAV 1.019 04/08/2020    LEUKOCYTESUR NEGATIVE 04/08/2020    UROBILINOGEN Normal 04/08/2020    BILIRUBINUR NEGATIVE 04/08/2020    GLUCOSEU NEGATIVE 04/08/2020    [address] NEGATIVE 04/08/2020    AMORPHOUS NOT REPORTED 04/08/2020     Radiology:     Reviewed. Assessment:     1.  Renal failure renal functions are improving creatinine is down to 1.2.  2.  Hyperkalemia improved with Florinef. 3.  Metabolic alkalosis partly due to bicarbonate drip  4. Low urine output improved with Bumex, now good. 5.  Hypotension improved, off pressors. 6. Ventilator dependent respiratory failure. Plan:   1. Continue Bumex 1 mg IV twice daily  2. Repeat BMP at 1800 today. 3.  Florinef as needed. 4.  Following  Nutrition   Please ensure that patient is on a renal diet/TF.  Avoid nephrotoxic drugs/contrast exposure. We will continue to follow along with you.    Will discuss with MAY Orlando  [facility]

## 2020-04-18 NOTE — PROGRESS NOTES
Critical Care Team - Daily Progress Note      Date and time: 4/18/2020 7:25 AM  Patient's name:  Jez Tavera Record Number: [id number]  Patient's account/billing number: [de-identified]  Patient's YOB: 1935  Age: 82 y.o.   Date of Admission: 4/8/2020  3:03 AM  Length of stay during current admission: 10      Primary Care Physician: Paola Ortiz MD  ICU Attending Physician: Dr. Thai Spear    Code Status: DNR-CCA    Reason for ICU admission: Respiratory Failure 2/2 COVID-19    SUBJECTIVE:     OVERNIGHT EVENTS:         No acute events overnight; afebrile   Intubated, sedated and paralyzed  PRVC 30/360/12/65  ABG 7.513, 42.8, 61.6, 34.4  No dialysis yesterday     AWAKE & FOLLOWING COMMANDS:  [x] No   [] Yes    CURRENT VENTILATION STATUS:     [x] Ventilator  [] BIPAP  [] Nasal Cannula [] Room Air      IF INTUBATED, ET TUBE MARKING AT LOWER LIP:       cms    SECRETIONS Amount:  [] Small [] Moderate  [] Large  [] None  Color:     [] White [] Colored  [] Bloody    SEDATION:  RAAS Score:  [] Propofol gtt  [x] Versed gtt  [] Ativan gtt   [] No Sedation    PARALYZED:  [] No    [x] Yes    DIARRHEA:                [x] No                [] Yes  (C. Difficile status: [] positive                                                                                                                       [] negative                                                                                                                     [] pending)    VASOPRESSORS:  [] No    [x] Yes    If yes -   [x] Levophed       [] Dopamine     [] Vasopressin       [] Dobutamine  [] Phenylephrine         [] Epinephrine    CENTRAL LINES:     [] No   [x] Yes   (Date of Insertion:4/8/2020)           If yes -     [] Right IJ     [] Left IJ [x] Right Femoral [x] Left Femoral HD cath                   [] Right Subclavian [] Left Subclavian       SHARP'S CATHETER:   [] No   [x] Yes    URINE OUTPUT:            [] Good   [x] Low              [] Infusions:   dextrose      norepinephrine Stopped (04/16/20 1652)    vasopressin (Septic Shock) infusion Stopped (04/15/20 4116)    vecuronium (NORCURON) infusion 0.5 mcg/kg/min (04/17/20 2113)    sodium chloride 10 mL/hr at 04/17/20 2115    midazolam 10 mg/hr (04/17/20 2114)    fentaNYL 150 mcg/hr (04/18/20 0535)     PRN Meds:   potassium chloride, 20 mEq, PRN  lubrifresh P.M., , PRN  metoprolol, 5 mg, Q4H PRN  glucose, 15 g, PRN  dextrose, 12.5 g, PRN  glucagon (rDNA), 1 mg, PRN  dextrose, 100 mL/hr, PRN  potassium chloride, 10 mEq, PRN  prochlorperazine, 10 mg, Q6H PRN  sodium chloride flush, 10 mL, PRN  acetaminophen, 650 mg, Q6H PRN    Or  acetaminophen, 650 mg, Q6H PRN  polyethylene glycol, 17 g, Daily PRN          VENT SETTINGS (Comprehensive) (if applicable):  Vent Information  $Ventilation: $Subsequent Day  Skin Assessment: Clean, dry, & intact  Equipment ID: [id number]  Equipment Changed: HME  Vent Type: Servo i  Vent Mode: PRVC  Time high: 5 cmH2O  Pressure High: 30 cmH2O  Pressure low: 0.5 cmH2O  Vt Ordered: 360 mL  Pressure Ordered: 0  Rate Set: 30 bmp  Pressure Support: 2 cmH20  FiO2 : 65 %  SpO2: 93 %  SpO2/FiO2 ratio: 143.08  Sensitivity: 0  PEEP/CPAP: 12  I Time/ I Time %: 0.9 s  Humidification Source: HME  Additional Respiratory  Assessments  Pulse: 93  Resp: 30  SpO2: 93 %  End Tidal CO2: 28 (%)  Position: Semi-Duenas's  Humidification Source: HME  Oral Care Completed?: Yes  Oral Care: Lip moisturizer applied  Subglottic Suction Done?: Yes  Skin barrier applied: No      Laboratory findings:    Complete Blood Count:   Recent Labs     04/16/20  0400 04/18/20  0442   WBC 13.5* 7.7   HGB 8.0* 7.8*   HCT 26.3* 24.6*   * See Reflexed IPF Result        Last 3 Blood Glucose:   Recent Labs     04/17/20  0339 04/17/20 1656 04/18/20  0442   GLUCOSE 128* 122* 103*        PT/INR:    Lab Results   Component Value Date    PROTIME 10.3 04/08/2020    INR 1.0 04/08/2020     PTT:    Lab Results

## 2020-04-18 NOTE — PROGRESS NOTES
Patient extubated per physician order. Patient extubated in usual fashion. Patient placed on  room air.      Laura Denis   6:25 PM

## 2020-04-18 NOTE — DISCHARGE SUMMARY
family pateint was initially made DNR-CCA and given some time to further progress with hope for improvement. Unfortunately pt continued to require respiratory support without significant improvement with increasing amounts of sedation and analgesia. Palliative care was consulted. After further discussions with family it was concluded with all children present that Ms. Claudio Lundy would have desired comfort care measures. Ms. Penny Monae code status was changed to [facility]. Patient was terminally extubated on 2020 and passed away at 16:44PM.     Consults:   cardiology, pulmonary/intensive care, ID, nephrology and Palliate care     Procedures:  Hemodialysis catheter placement in left femoral vein, Right femoral central venous access. Hemodialysis Endotracheal intubation, Hemodialysis     Any Hospital Acquired Infections: None    PATIENT'S DISCHARGE CONDITION:        Disposition: [address] [place] [address]      Electronically signed by Jabari Renteria MD on 2020 at 5:14 PM      Attending Physician Statement  I have discussed the care of Macho Yang, including pertinent history and exam findings with the resident. I have reviewed the key elements of all parts of the encounter with the resident. I agree with the assessment and plan and status of the problem list as documented.   Please see progress note today 2020      Sharyle Platts, MD  2020 8:39 PM

## 2020-04-19 LAB
EKG ATRIAL RATE: 121 BPM
EKG ATRIAL RATE: 134 BPM
EKG ATRIAL RATE: 60 BPM
EKG P AXIS: 49 DEGREES
EKG P AXIS: 55 DEGREES
EKG P-R INTERVAL: 140 MS
EKG P-R INTERVAL: 160 MS
EKG P-R INTERVAL: 240 MS
EKG Q-T INTERVAL: 232 MS
EKG Q-T INTERVAL: 420 MS
EKG Q-T INTERVAL: 424 MS
EKG QRS DURATION: 106 MS
EKG QRS DURATION: 114 MS
EKG QRS DURATION: 88 MS
EKG QTC CALCULATION (BAZETT): 346 MS
EKG QTC CALCULATION (BAZETT): 420 MS
EKG QTC CALCULATION (BAZETT): 602 MS
EKG R AXIS: -23 DEGREES
EKG R AXIS: -52 DEGREES
EKG R AXIS: -56 DEGREES
EKG T AXIS: 92 DEGREES
EKG T AXIS: 94 DEGREES
EKG T AXIS: 95 DEGREES
EKG VENTRICULAR RATE: 121 BPM
EKG VENTRICULAR RATE: 134 BPM
EKG VENTRICULAR RATE: 60 BPM

## 2022-06-24 NOTE — PROGRESS NOTES
Critical Care Team - Daily Progress Note      Date and time: 4/13/2020 7:56 AM  Patient's name:  Jez Tavera Record Number: [id number]  Patient's account/billing number: [de-identified]  Patient's YOB: 1935  Age: 82 y.o. Date of Admission: 4/8/2020  3:03 AM  Length of stay during current admission: 5      Primary Care Physician: Greta Waggoner MD  ICU Attending Physician: Dr. Davide Warner Status: Full Code    Reason for ICU admission:   Chief Complaint   Patient presents with    Cough    Shortness of Breath    Fever         SUBJECTIVE:     OVERNIGHT EVENTS:        No acute overnight events. VS-blood pressure normal range. Heart rate 80s. T-max 100 yesterday. Intubated continued on BiPAP mode since yesterday PEEP 5, FiO2 50%, spO2 98%  Patient proned since yesterday. PF ratio better. Sedated with versad. On vecoronium. Off pressors  ABG suggestive of acute on chronic respiratory alkalosis and metabolic alkalosis. Creatinine then down 1.03, Hb stable at 8.6. Liver function better. Last dose of Zithromax and Plaquenil. QTc 460 today. Thick creamy endotracheal secretions. Intake/Output Summary (Last 24 hours) at 4/13/2020 0756  Last data filed at 4/13/2020 0600  Gross per 24 hour   Intake 1946.55 ml   Output 1720 ml   Net 226.55 ml     On Lasix IV 40 daily. On tube feeds bolus 250 q6      AWAKE & FOLLOWING COMMANDS:  [x] No   [] Yes    CURRENT VENTILATION STATUS:     [x] Ventilator  [] BIPAP  [] Nasal Cannula [] Room Air        SECRETIONS Amount:  [x] Small [] Moderate  [] Large  [] None  Color:     [x] White [] Colored  [] Bloody    SEDATION:  RAAS Score:    And fentanyl [x] Versed gtt  [] Ativan gtt   [] No Sedation    PARALYZED:  [x] No    [] Yes    DIARRHEA:                [x] No                [] Yes  (C. Difficile status: [] positive                                                                                                                       [] negative 35 (%)  Position: Prone  Humidification Source: HME  Oral Care Completed?: Yes  Oral Care: Mouthwash, Mouth swabbed, Mouth suctioned  Subglottic Suction Done?: No  Skin barrier applied: No    ABGs: . Lab Results   Component Value Date    NMG8GTT 33 04/13/2020    FIO2 50.0 04/13/2020     Lactic Acid: No results found for: LACTA      DATA:  Complete Blood Count:   Recent Labs     04/11/20  0421 04/12/20  0509   WBC 5.5 10.2   HGB 8.4* 8.6*   MCV 94.0 94.9   * 139   RBC 2.67* 2.73*   HCT 25.1* 25.9*   MCH 31.5 31.5   MCHC 33.5 33.2   RDW 13.6 13.2   MPV 10.8 10.8        PT/INR:    Lab Results   Component Value Date    PROTIME 10.3 04/08/2020    INR 1.0 04/08/2020     PTT:    Lab Results   Component Value Date    APTT 27.2 04/08/2020       Basal Metabolic Profile:   Recent Labs     04/11/20  0421 04/12/20  0509 04/12/20  1558 04/13/20  0542    136  --  139   K 3.9 2.7* 3.3* 4.0   BUN 44* 44*  --  43*   CREATININE 1.57* 1.27*  --  1.03*   CL 95* 96*  --  98   CO2 27 27  --  29      Magnesium:   Lab Results   Component Value Date    MG 1.9 04/12/2020    MG 2.1 04/10/2020    MG 2.2 04/10/2020     Phosphorus: No results found for: PHOS  S. Calcium:  Recent Labs     04/13/20  0542   CALCIUM 8.7     S. Ionized Calcium:No results for input(s): IONCA in the last 72 hours.       Urinalysis:   Lab Results   Component Value Date    NITRU NEGATIVE 04/08/2020    COLORU YELLOW 04/08/2020    PHUR 5.5 04/08/2020    WBCUA None 04/08/2020    RBCUA 2 TO 5 04/08/2020    MUCUS NOT REPORTED 04/08/2020    TRICHOMONAS NOT REPORTED 04/08/2020    YEAST NOT REPORTED 04/08/2020    BACTERIA NOT REPORTED 04/08/2020    SPECGRAV 1.019 04/08/2020    LEUKOCYTESUR NEGATIVE 04/08/2020    UROBILINOGEN Normal 04/08/2020    BILIRUBINUR NEGATIVE 04/08/2020    GLUCOSEU NEGATIVE 04/08/2020    KETUA NEGATIVE 04/08/2020    AMORPHOUS NOT REPORTED 04/08/2020       CARDIAC ENZYMES: No results for input(s): CKMB, CKMBINDEX, TROPONINI in the last 72 hours.    Invalid input(s): CKTOTAL;3  BNP: No results for input(s): BNP in the last 72 hours. LFTS  Recent Labs     04/11/20  0421 04/12/20  0509 04/13/20  0542   ALKPHOS 81 87 82   ALT 15 16 15   AST 74* 68* 50*   BILITOT 0.34 0.37 0.39   LABALBU 2.1* 2.0* 2.1*       AMYLASE/LIPASE/AMMONIA  No results for input(s): AMYLASE, LIPASE, AMMONIA in the last 72 hours. Last 3 Blood Glucose:   Recent Labs     04/11/20  0421 04/12/20  0509 04/13/20  0542   GLUCOSE 94 144* 103*      HgBA1c:  No results found for: LABA1C      TSH:  No results found for: TSH  ANEMIA STUDIES  No results for input(s): LABIRON, TIBC, FERRITIN, [id number], FOLATE, OCCULTBLD in the last 72 hours. Cultures during this admission:     Blood cultures:                 [] None drawn      [x] Negative             []  Positive (Details:  )  Urine Culture:                   [] None drawn      [x] Negative             []  Positive (Details:  )  Sputum Culture:               [] None drawn       [x] Negative             []  Positive (Details:  )   Endotracheal aspirate:     [] None drawn       [x] Negative             []  Positive (Details:  )             Chest Xray (4/13/2020):    ASSESSMENT:     Principal Problem:    Pneumonia due to severe acute respiratory syndrome coronavirus 2 (SARS-CoV-2)  Active Problems:    Respiratory distress    Current chronic use of systemic steroids    Acute respiratory failure with hypoxia ([facility] [address]    Suspected COVID-19 virus infection    Community acquired pneumonia    FRANSISCA (acute kidney injury) ([facility] [address]    Metabolic acidosis    Pneumonia due to organism  Resolved Problems:    * No resolved hospital problems. *          PLAN:     1. Acute hypoxic respiratory failure sec to COVID-19 infection. COVID19 pandemic  Viral ARDS-Intubated continued on BiPAP mode since yesterday PEEP 5, FiO2 50%, spO2 98%. Patient proned since yesterday. PF ratio better. Sedated with Versed and fentanyl. Moves extremities to pain.   ABG Patent

## (undated) DEVICE — 1000 S-DRAPE TOWEL DRAPE 10/BX: Brand: STERI-DRAPE™

## (undated) DEVICE — STRIP,CLOSURE,WOUND,MEDI-STRIP,1/2X4: Brand: MEDLINE

## (undated) DEVICE — BONE TAMP KIT KPX153PB FF X2 15/3 1 STP: Brand: KYPHOPAK™ TRAY

## (undated) DEVICE — MIXER A07A CEMENT

## (undated) DEVICE — SHEET, T, LAPAROTOMY, STERILE: Brand: MEDLINE

## (undated) DEVICE — GARMENT,MEDLINE,DVT,INT,CALF,MED, GEN2: Brand: MEDLINE

## (undated) DEVICE — GLOVE ORANGE PI 7 1/2   MSG9075

## (undated) DEVICE — GOWN,AURORA,NONRNF,XL,30/CS: Brand: MEDLINE

## (undated) DEVICE — GLOVE SURG SZ 65 THK91MIL LTX FREE SYN POLYISOPRENE

## (undated) DEVICE — JACKSON / MODULAR SPINAL TABLE STYLE POSITIONING KIT - STANDARD: Brand: SOULE MEDICAL

## (undated) DEVICE — GUIDEWIRE VASC

## (undated) DEVICE — SYRINGE A08E KIS INFLATION HP: Brand: KYPHON®  INFLATION SYRINGE

## (undated) DEVICE — CURETTE A13A SIZE 2 T-TIP: Brand: KYPHON® EXPRESS ™ CURETTE

## (undated) DEVICE — SVMMC KYPHOPLASTY PK

## (undated) DEVICE — BONE TAMP KIT KPX153NB AF X2 15/3

## (undated) DEVICE — CONTAINER,SPECIMEN,4OZ,OR STRL: Brand: MEDLINE

## (undated) DEVICE — GLOVE SURG SZ 8 THK118MIL BLK LTX FREE POLYISOPRENE BEAD CUF

## (undated) DEVICE — HYPODERMIC SAFETY NEEDLE: Brand: MAGELLAN

## (undated) DEVICE — SNARE ENDOSCP L240CM OD2.4MM LOOP W15MM RND INSUL STIFF

## (undated) DEVICE — FORCEPS BX L240CM WRK CHN 2.8MM STD CAP W/ NDL MIC MESH